# Patient Record
Sex: MALE | Race: WHITE | NOT HISPANIC OR LATINO | Employment: FULL TIME | URBAN - METROPOLITAN AREA
[De-identification: names, ages, dates, MRNs, and addresses within clinical notes are randomized per-mention and may not be internally consistent; named-entity substitution may affect disease eponyms.]

---

## 2020-03-06 ENCOUNTER — HISTORICAL (OUTPATIENT)
Dept: ADMINISTRATIVE | Facility: HOSPITAL | Age: 60
End: 2020-03-06

## 2021-07-06 RX ORDER — ALBUTEROL SULFATE 90 UG/1
2 AEROSOL, METERED RESPIRATORY (INHALATION) EVERY 6 HOURS PRN
COMMUNITY
End: 2021-07-14 | Stop reason: SDUPTHER

## 2021-07-06 RX ORDER — ROSUVASTATIN CALCIUM 20 MG/1
20 TABLET, COATED ORAL DAILY
COMMUNITY
End: 2021-07-14 | Stop reason: SDUPTHER

## 2021-07-06 RX ORDER — LOSARTAN POTASSIUM 50 MG/1
50 TABLET ORAL DAILY
COMMUNITY

## 2021-07-06 RX ORDER — ASPIRIN 81 MG/1
81 TABLET ORAL DAILY
COMMUNITY

## 2021-07-06 RX ORDER — CLOPIDOGREL BISULFATE 75 MG/1
75 TABLET ORAL DAILY
Status: ON HOLD | COMMUNITY
End: 2023-10-12 | Stop reason: SDUPTHER

## 2021-07-14 ENCOUNTER — OFFICE VISIT (OUTPATIENT)
Dept: FAMILY MEDICINE | Facility: CLINIC | Age: 61
End: 2021-07-14
Payer: COMMERCIAL

## 2021-07-14 DIAGNOSIS — Z12.5 PROSTATE CANCER SCREENING: ICD-10-CM

## 2021-07-14 DIAGNOSIS — I10 ESSENTIAL HYPERTENSION: ICD-10-CM

## 2021-07-14 DIAGNOSIS — E78.2 MIXED HYPERLIPIDEMIA: ICD-10-CM

## 2021-07-14 DIAGNOSIS — J42 CHRONIC BRONCHITIS, UNSPECIFIED CHRONIC BRONCHITIS TYPE: ICD-10-CM

## 2021-07-14 DIAGNOSIS — Z00.00 WELL ADULT EXAM: Primary | ICD-10-CM

## 2021-07-14 LAB
ALBUMIN SERPL BCP-MCNC: 4 G/DL (ref 3.5–5)
ALBUMIN/GLOB SERPL: 1.4 {RATIO}
ALP SERPL-CCNC: 80 U/L (ref 45–115)
ALT SERPL W P-5'-P-CCNC: 29 U/L (ref 16–61)
ANION GAP SERPL CALCULATED.3IONS-SCNC: 13 MMOL/L (ref 7–16)
AST SERPL W P-5'-P-CCNC: 21 U/L (ref 15–37)
BASOPHILS # BLD AUTO: 0.03 K/UL (ref 0–0.2)
BASOPHILS NFR BLD AUTO: 0.4 % (ref 0–1)
BILIRUB SERPL-MCNC: 0.5 MG/DL (ref 0–1.2)
BUN SERPL-MCNC: 21 MG/DL (ref 7–18)
BUN/CREAT SERPL: 21 (ref 6–20)
CALCIUM SERPL-MCNC: 8.9 MG/DL (ref 8.5–10.1)
CHLORIDE SERPL-SCNC: 110 MMOL/L (ref 98–107)
CHOLEST SERPL-MCNC: 141 MG/DL (ref 0–200)
CHOLEST/HDLC SERPL: 3 {RATIO}
CO2 SERPL-SCNC: 23 MMOL/L (ref 21–32)
CREAT SERPL-MCNC: 1.02 MG/DL (ref 0.7–1.3)
DIFFERENTIAL METHOD BLD: ABNORMAL
EOSINOPHIL # BLD AUTO: 0.46 K/UL (ref 0–0.5)
EOSINOPHIL NFR BLD AUTO: 6.5 % (ref 1–4)
ERYTHROCYTE [DISTWIDTH] IN BLOOD BY AUTOMATED COUNT: 12.7 % (ref 11.5–14.5)
GLOBULIN SER-MCNC: 2.9 G/DL (ref 2–4)
GLUCOSE SERPL-MCNC: 108 MG/DL (ref 74–106)
HCT VFR BLD AUTO: 39.3 % (ref 40–54)
HDLC SERPL-MCNC: 47 MG/DL (ref 40–60)
HGB BLD-MCNC: 13.3 G/DL (ref 13.5–18)
IMM GRANULOCYTES # BLD AUTO: 0.02 K/UL (ref 0–0.04)
IMM GRANULOCYTES NFR BLD: 0.3 % (ref 0–0.4)
LDLC SERPL CALC-MCNC: 78 MG/DL
LDLC/HDLC SERPL: 1.7 {RATIO}
LYMPHOCYTES # BLD AUTO: 1.94 K/UL (ref 1–4.8)
LYMPHOCYTES NFR BLD AUTO: 27.3 % (ref 27–41)
MCH RBC QN AUTO: 32.1 PG (ref 27–31)
MCHC RBC AUTO-ENTMCNC: 33.8 G/DL (ref 32–36)
MCV RBC AUTO: 94.9 FL (ref 80–96)
MONOCYTES # BLD AUTO: 0.64 K/UL (ref 0–0.8)
MONOCYTES NFR BLD AUTO: 9 % (ref 2–6)
MPC BLD CALC-MCNC: 11.3 FL (ref 9.4–12.4)
NEUTROPHILS # BLD AUTO: 4.02 K/UL (ref 1.8–7.7)
NEUTROPHILS NFR BLD AUTO: 56.5 % (ref 53–65)
NONHDLC SERPL-MCNC: 94 MG/DL
NRBC # BLD AUTO: 0 X10E3/UL
NRBC, AUTO (.00): 0 %
PLATELET # BLD AUTO: 240 K/UL (ref 150–400)
POTASSIUM SERPL-SCNC: 4.2 MMOL/L (ref 3.5–5.1)
PROT SERPL-MCNC: 6.9 G/DL (ref 6.4–8.2)
PSA SERPL-MCNC: 1.01 NG/ML (ref 0–4.1)
RBC # BLD AUTO: 4.14 M/UL (ref 4.6–6.2)
SODIUM SERPL-SCNC: 142 MMOL/L (ref 136–145)
TRIGL SERPL-MCNC: 79 MG/DL (ref 35–150)
VLDLC SERPL-MCNC: 16 MG/DL
WBC # BLD AUTO: 7.11 K/UL (ref 4.5–11)

## 2021-07-14 PROCEDURE — 80061 LIPID PANEL: ICD-10-PCS | Mod: ,,, | Performed by: CLINICAL MEDICAL LABORATORY

## 2021-07-14 PROCEDURE — 85025 CBC WITH DIFFERENTIAL: ICD-10-PCS | Mod: ,,, | Performed by: CLINICAL MEDICAL LABORATORY

## 2021-07-14 PROCEDURE — 99396 PR PREVENTIVE VISIT,EST,40-64: ICD-10-PCS | Mod: ,,, | Performed by: FAMILY MEDICINE

## 2021-07-14 PROCEDURE — 80053 COMPREHENSIVE METABOLIC PANEL: ICD-10-PCS | Mod: ,,, | Performed by: CLINICAL MEDICAL LABORATORY

## 2021-07-14 PROCEDURE — 99396 PREV VISIT EST AGE 40-64: CPT | Mod: ,,, | Performed by: FAMILY MEDICINE

## 2021-07-14 PROCEDURE — 80061 LIPID PANEL: CPT | Mod: ,,, | Performed by: CLINICAL MEDICAL LABORATORY

## 2021-07-14 PROCEDURE — G0103 PSA SCREENING: HCPCS | Mod: ,,, | Performed by: CLINICAL MEDICAL LABORATORY

## 2021-07-14 PROCEDURE — 80053 COMPREHEN METABOLIC PANEL: CPT | Mod: ,,, | Performed by: CLINICAL MEDICAL LABORATORY

## 2021-07-14 PROCEDURE — G0103 PSA, SCREENING: ICD-10-PCS | Mod: ,,, | Performed by: CLINICAL MEDICAL LABORATORY

## 2021-07-14 PROCEDURE — 85025 COMPLETE CBC W/AUTO DIFF WBC: CPT | Mod: ,,, | Performed by: CLINICAL MEDICAL LABORATORY

## 2021-07-14 RX ORDER — ROSUVASTATIN CALCIUM 20 MG/1
20 TABLET, COATED ORAL DAILY
Qty: 90 TABLET | Refills: 3 | Status: SHIPPED | OUTPATIENT
Start: 2021-07-14

## 2021-07-14 RX ORDER — ALBUTEROL SULFATE 90 UG/1
2 AEROSOL, METERED RESPIRATORY (INHALATION) EVERY 6 HOURS PRN
Qty: 18 G | Refills: 11 | Status: SHIPPED | OUTPATIENT
Start: 2021-07-14

## 2021-07-28 ENCOUNTER — OFFICE VISIT (OUTPATIENT)
Dept: FAMILY MEDICINE | Facility: CLINIC | Age: 61
End: 2021-07-28
Payer: COMMERCIAL

## 2021-07-28 VITALS
BODY MASS INDEX: 20.27 KG/M2 | SYSTOLIC BLOOD PRESSURE: 130 MMHG | WEIGHT: 144.81 LBS | HEART RATE: 76 BPM | OXYGEN SATURATION: 96 % | RESPIRATION RATE: 18 BRPM | HEIGHT: 71 IN | DIASTOLIC BLOOD PRESSURE: 84 MMHG

## 2021-07-28 DIAGNOSIS — K59.00 CONSTIPATION, UNSPECIFIED CONSTIPATION TYPE: ICD-10-CM

## 2021-07-28 DIAGNOSIS — I10 ESSENTIAL HYPERTENSION: ICD-10-CM

## 2021-07-28 DIAGNOSIS — F17.210 NICOTINE DEPENDENCE, CIGARETTES, UNCOMPLICATED: ICD-10-CM

## 2021-07-28 DIAGNOSIS — J42 CHRONIC BRONCHITIS, UNSPECIFIED CHRONIC BRONCHITIS TYPE: Primary | ICD-10-CM

## 2021-07-28 PROCEDURE — 1160F PR REVIEW ALL MEDS BY PRESCRIBER/CLIN PHARMACIST DOCUMENTED: ICD-10-PCS | Mod: CPTII,,, | Performed by: FAMILY MEDICINE

## 2021-07-28 PROCEDURE — 3079F DIAST BP 80-89 MM HG: CPT | Mod: CPTII,,, | Performed by: FAMILY MEDICINE

## 2021-07-28 PROCEDURE — 1160F RVW MEDS BY RX/DR IN RCRD: CPT | Mod: CPTII,,, | Performed by: FAMILY MEDICINE

## 2021-07-28 PROCEDURE — 1159F MED LIST DOCD IN RCRD: CPT | Mod: CPTII,,, | Performed by: FAMILY MEDICINE

## 2021-07-28 PROCEDURE — 99214 OFFICE O/P EST MOD 30 MIN: CPT | Mod: ,,, | Performed by: FAMILY MEDICINE

## 2021-07-28 PROCEDURE — 99214 PR OFFICE/OUTPT VISIT, EST, LEVL IV, 30-39 MIN: ICD-10-PCS | Mod: ,,, | Performed by: FAMILY MEDICINE

## 2021-07-28 PROCEDURE — 3075F SYST BP GE 130 - 139MM HG: CPT | Mod: CPTII,,, | Performed by: FAMILY MEDICINE

## 2021-07-28 PROCEDURE — 3008F BODY MASS INDEX DOCD: CPT | Mod: CPTII,,, | Performed by: FAMILY MEDICINE

## 2021-07-28 PROCEDURE — 3008F PR BODY MASS INDEX (BMI) DOCUMENTED: ICD-10-PCS | Mod: CPTII,,, | Performed by: FAMILY MEDICINE

## 2021-07-28 PROCEDURE — 3075F PR MOST RECENT SYSTOLIC BLOOD PRESS GE 130-139MM HG: ICD-10-PCS | Mod: CPTII,,, | Performed by: FAMILY MEDICINE

## 2021-07-28 PROCEDURE — 3079F PR MOST RECENT DIASTOLIC BLOOD PRESSURE 80-89 MM HG: ICD-10-PCS | Mod: CPTII,,, | Performed by: FAMILY MEDICINE

## 2021-07-28 PROCEDURE — 1159F PR MEDICATION LIST DOCUMENTED IN MEDICAL RECORD: ICD-10-PCS | Mod: CPTII,,, | Performed by: FAMILY MEDICINE

## 2021-07-28 RX ORDER — AMLODIPINE BESYLATE 5 MG/1
1 TABLET ORAL DAILY
COMMUNITY
End: 2023-08-30

## 2021-07-28 RX ORDER — CARVEDILOL 6.25 MG/1
1 TABLET ORAL DAILY
COMMUNITY
Start: 2021-02-25

## 2021-07-28 RX ORDER — MORPHINE SULFATE 30 MG/1
30 TABLET, FILM COATED, EXTENDED RELEASE ORAL EVERY 12 HOURS
COMMUNITY
Start: 2021-07-13 | End: 2023-08-30

## 2021-07-28 RX ORDER — IRBESARTAN 150 MG/1
1 TABLET ORAL DAILY
COMMUNITY
End: 2023-08-30

## 2021-07-28 RX ORDER — UMECLIDINIUM BROMIDE AND VILANTEROL TRIFENATATE 62.5; 25 UG/1; UG/1
1 POWDER RESPIRATORY (INHALATION) DAILY
Qty: 60 EACH | Refills: 11 | Status: SHIPPED | OUTPATIENT
Start: 2021-07-28 | End: 2023-08-30

## 2021-08-04 ENCOUNTER — OFFICE VISIT (OUTPATIENT)
Dept: GASTROENTEROLOGY | Facility: CLINIC | Age: 61
End: 2021-08-04
Payer: COMMERCIAL

## 2021-08-04 VITALS
SYSTOLIC BLOOD PRESSURE: 126 MMHG | DIASTOLIC BLOOD PRESSURE: 79 MMHG | HEIGHT: 71 IN | WEIGHT: 145 LBS | RESPIRATION RATE: 16 BRPM | HEART RATE: 65 BPM | OXYGEN SATURATION: 98 % | BODY MASS INDEX: 20.3 KG/M2

## 2021-08-04 DIAGNOSIS — K59.04 CHRONIC IDIOPATHIC CONSTIPATION: Primary | ICD-10-CM

## 2021-08-04 DIAGNOSIS — K59.00 CONSTIPATION, UNSPECIFIED CONSTIPATION TYPE: ICD-10-CM

## 2021-08-04 DIAGNOSIS — Z12.11 ENCOUNTER FOR SCREENING FOR MALIGNANT NEOPLASM OF COLON: ICD-10-CM

## 2021-08-04 PROCEDURE — 3078F DIAST BP <80 MM HG: CPT | Mod: CPTII,,, | Performed by: NURSE PRACTITIONER

## 2021-08-04 PROCEDURE — 3074F SYST BP LT 130 MM HG: CPT | Mod: CPTII,,, | Performed by: NURSE PRACTITIONER

## 2021-08-04 PROCEDURE — 99203 PR OFFICE/OUTPT VISIT, NEW, LEVL III, 30-44 MIN: ICD-10-PCS | Mod: ,,, | Performed by: NURSE PRACTITIONER

## 2021-08-04 PROCEDURE — 3008F PR BODY MASS INDEX (BMI) DOCUMENTED: ICD-10-PCS | Mod: CPTII,,, | Performed by: NURSE PRACTITIONER

## 2021-08-04 PROCEDURE — 99203 OFFICE O/P NEW LOW 30 MIN: CPT | Mod: ,,, | Performed by: NURSE PRACTITIONER

## 2021-08-04 PROCEDURE — 3008F BODY MASS INDEX DOCD: CPT | Mod: CPTII,,, | Performed by: NURSE PRACTITIONER

## 2021-08-04 PROCEDURE — 3078F PR MOST RECENT DIASTOLIC BLOOD PRESSURE < 80 MM HG: ICD-10-PCS | Mod: CPTII,,, | Performed by: NURSE PRACTITIONER

## 2021-08-04 PROCEDURE — 3074F PR MOST RECENT SYSTOLIC BLOOD PRESSURE < 130 MM HG: ICD-10-PCS | Mod: CPTII,,, | Performed by: NURSE PRACTITIONER

## 2021-08-04 RX ORDER — PLECANATIDE 3 MG/1
3 TABLET ORAL DAILY
Qty: 90 TABLET | Refills: 3 | Status: SHIPPED | OUTPATIENT
Start: 2021-08-04 | End: 2023-08-30

## 2021-08-17 ENCOUNTER — HOSPITAL ENCOUNTER (OUTPATIENT)
Dept: RADIOLOGY | Facility: HOSPITAL | Age: 61
Discharge: HOME | End: 2021-08-17
Attending: FAMILY MEDICINE
Payer: COMMERCIAL

## 2021-08-17 DIAGNOSIS — F17.210 NICOTINE DEPENDENCE, CIGARETTES, UNCOMPLICATED: ICD-10-CM

## 2021-08-17 PROCEDURE — 71271 CT THORAX LUNG CANCER SCR C-: CPT | Mod: TC

## 2021-08-17 PROCEDURE — 71271 CT CHEST LUNG SCREENING LOW DOSE: ICD-10-PCS | Mod: 26,,, | Performed by: RADIOLOGY

## 2021-08-17 PROCEDURE — 71271 CT THORAX LUNG CANCER SCR C-: CPT | Mod: 26,,, | Performed by: RADIOLOGY

## 2021-08-26 ENCOUNTER — INFUSION (OUTPATIENT)
Dept: INFECTIOUS DISEASES | Facility: HOSPITAL | Age: 61
End: 2021-08-26
Attending: NURSE PRACTITIONER
Payer: COMMERCIAL

## 2021-08-26 VITALS
SYSTOLIC BLOOD PRESSURE: 148 MMHG | HEART RATE: 110 BPM | DIASTOLIC BLOOD PRESSURE: 84 MMHG | TEMPERATURE: 98 F | OXYGEN SATURATION: 98 %

## 2021-08-26 DIAGNOSIS — U07.1 COVID-19: Primary | ICD-10-CM

## 2021-08-26 PROCEDURE — M0243 CASIRIVI AND IMDEVI INFUSION: HCPCS | Performed by: FAMILY MEDICINE

## 2021-08-26 PROCEDURE — 25000003 PHARM REV CODE 250: Performed by: FAMILY MEDICINE

## 2021-08-26 PROCEDURE — 63600175 PHARM REV CODE 636 W HCPCS: Performed by: FAMILY MEDICINE

## 2021-08-26 RX ORDER — SODIUM CHLORIDE 0.9 % (FLUSH) 0.9 %
10 SYRINGE (ML) INJECTION
Status: DISCONTINUED | OUTPATIENT
Start: 2021-08-26 | End: 2023-08-30

## 2021-08-26 RX ORDER — ACETAMINOPHEN 325 MG/1
650 TABLET ORAL ONCE AS NEEDED
Status: DISCONTINUED | OUTPATIENT
Start: 2021-08-26 | End: 2023-08-30

## 2021-08-26 RX ORDER — DIPHENHYDRAMINE HYDROCHLORIDE 50 MG/ML
25 INJECTION INTRAMUSCULAR; INTRAVENOUS ONCE AS NEEDED
Status: DISCONTINUED | OUTPATIENT
Start: 2021-08-26 | End: 2023-08-30

## 2021-08-26 RX ORDER — EPINEPHRINE 0.3 MG/.3ML
0.3 INJECTION SUBCUTANEOUS
Status: DISCONTINUED | OUTPATIENT
Start: 2021-08-26 | End: 2023-08-30

## 2021-08-26 RX ORDER — ALBUTEROL SULFATE 90 UG/1
2 AEROSOL, METERED RESPIRATORY (INHALATION)
Status: DISCONTINUED | OUTPATIENT
Start: 2021-08-26 | End: 2023-08-30

## 2021-08-26 RX ORDER — ONDANSETRON 4 MG/1
4 TABLET, ORALLY DISINTEGRATING ORAL ONCE AS NEEDED
Status: DISCONTINUED | OUTPATIENT
Start: 2021-08-26 | End: 2023-08-30

## 2021-08-26 RX ADMIN — CASIRIVIMAB AND IMDEVIMAB 600 MG: 600; 600 INJECTION, SOLUTION, CONCENTRATE INTRAVENOUS at 11:08

## 2021-09-07 DIAGNOSIS — Z11.59 SCREENING FOR VIRAL DISEASE: Primary | ICD-10-CM

## 2022-01-07 DIAGNOSIS — U07.1 COVID-19: Primary | ICD-10-CM

## 2022-01-11 ENCOUNTER — INFUSION (OUTPATIENT)
Dept: INFECTIOUS DISEASES | Facility: HOSPITAL | Age: 62
End: 2022-01-11
Attending: FAMILY MEDICINE
Payer: COMMERCIAL

## 2022-01-11 VITALS
SYSTOLIC BLOOD PRESSURE: 128 MMHG | HEART RATE: 73 BPM | TEMPERATURE: 98 F | OXYGEN SATURATION: 96 % | DIASTOLIC BLOOD PRESSURE: 54 MMHG

## 2022-01-11 DIAGNOSIS — U07.1 COVID-19: ICD-10-CM

## 2022-01-11 PROCEDURE — 25000003 PHARM REV CODE 250: Performed by: FAMILY MEDICINE

## 2022-01-11 PROCEDURE — 63600175 PHARM REV CODE 636 W HCPCS: Performed by: FAMILY MEDICINE

## 2022-01-11 PROCEDURE — M0245 HC IV INFUSION, BAMLANIVIMAB/ETESEVIMAB, INCL POST ADMIN MONIT: HCPCS | Performed by: FAMILY MEDICINE

## 2022-01-11 RX ORDER — ALBUTEROL SULFATE 90 UG/1
2 AEROSOL, METERED RESPIRATORY (INHALATION)
Status: DISCONTINUED | OUTPATIENT
Start: 2022-01-11 | End: 2023-08-30

## 2022-01-11 RX ORDER — ONDANSETRON 4 MG/1
4 TABLET, ORALLY DISINTEGRATING ORAL ONCE AS NEEDED
Status: DISCONTINUED | OUTPATIENT
Start: 2022-01-11 | End: 2023-08-30

## 2022-01-11 RX ORDER — EPINEPHRINE 0.3 MG/.3ML
0.3 INJECTION SUBCUTANEOUS
Status: DISCONTINUED | OUTPATIENT
Start: 2022-01-11 | End: 2023-08-30

## 2022-01-11 RX ORDER — SODIUM CHLORIDE 0.9 % (FLUSH) 0.9 %
10 SYRINGE (ML) INJECTION
Status: DISCONTINUED | OUTPATIENT
Start: 2022-01-11 | End: 2023-08-30

## 2022-01-11 RX ORDER — ACETAMINOPHEN 325 MG/1
650 TABLET ORAL ONCE AS NEEDED
Status: DISCONTINUED | OUTPATIENT
Start: 2022-01-11 | End: 2023-08-30

## 2022-01-11 RX ORDER — DIPHENHYDRAMINE HYDROCHLORIDE 50 MG/ML
25 INJECTION INTRAMUSCULAR; INTRAVENOUS ONCE AS NEEDED
Status: DISCONTINUED | OUTPATIENT
Start: 2022-01-11 | End: 2023-08-30

## 2022-01-11 RX ADMIN — SODIUM CHLORIDE 700 MG: 9 INJECTION, SOLUTION INTRAVENOUS at 09:01

## 2022-01-11 NOTE — PROGRESS NOTES
0950 Arrived foe a Bamlanivimab infusion.    
1040 Infusion is complete. Flushed with 30 cc NS.    
1145 Discharge instructions given. Left ambulatory through the ER exit.    
Statement Selected

## 2022-02-08 ENCOUNTER — OFFICE VISIT (OUTPATIENT)
Dept: PRIMARY CARE CLINIC | Facility: CLINIC | Age: 62
End: 2022-02-08
Payer: OTHER MISCELLANEOUS

## 2022-02-08 ENCOUNTER — HOSPITAL ENCOUNTER (OUTPATIENT)
Dept: RADIOLOGY | Facility: HOSPITAL | Age: 62
Discharge: HOME | End: 2022-02-08
Attending: NURSE PRACTITIONER
Payer: COMMERCIAL

## 2022-02-08 VITALS
HEART RATE: 82 BPM | WEIGHT: 145 LBS | TEMPERATURE: 98 F | BODY MASS INDEX: 20.3 KG/M2 | HEIGHT: 71 IN | RESPIRATION RATE: 18 BRPM | SYSTOLIC BLOOD PRESSURE: 152 MMHG | DIASTOLIC BLOOD PRESSURE: 99 MMHG | OXYGEN SATURATION: 97 %

## 2022-02-08 DIAGNOSIS — S20.211A RIB CONTUSION, RIGHT, INITIAL ENCOUNTER: Primary | ICD-10-CM

## 2022-02-08 DIAGNOSIS — S20.211A RIB CONTUSION, RIGHT, INITIAL ENCOUNTER: ICD-10-CM

## 2022-02-08 PROCEDURE — 99213 OFFICE O/P EST LOW 20 MIN: CPT | Mod: ,,, | Performed by: NURSE PRACTITIONER

## 2022-02-08 PROCEDURE — 99213 PR OFFICE/OUTPT VISIT, EST, LEVL III, 20-29 MIN: ICD-10-PCS | Mod: ,,, | Performed by: NURSE PRACTITIONER

## 2022-02-08 PROCEDURE — 71101 XR RIBS MIN 3 VIEWS W/ PA CHEST RIGHT: ICD-10-PCS | Mod: 26,RT,, | Performed by: RADIOLOGY

## 2022-02-08 PROCEDURE — 71101 X-RAY EXAM UNILAT RIBS/CHEST: CPT | Mod: TC,RT

## 2022-02-08 PROCEDURE — 71101 X-RAY EXAM UNILAT RIBS/CHEST: CPT | Mod: 26,RT,, | Performed by: RADIOLOGY

## 2022-02-08 RX ORDER — METHOCARBAMOL 500 MG/1
500 TABLET, FILM COATED ORAL 4 TIMES DAILY
Qty: 30 TABLET | Refills: 0 | Status: SHIPPED | OUTPATIENT
Start: 2022-02-08 | End: 2022-04-06 | Stop reason: SDUPTHER
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
## 2022-03-28 ENCOUNTER — OFFICE VISIT (OUTPATIENT)
Dept: PRIMARY CARE CLINIC | Facility: CLINIC | Age: 62
End: 2022-03-28
Payer: OTHER MISCELLANEOUS

## 2022-03-28 ENCOUNTER — HOSPITAL ENCOUNTER (OUTPATIENT)
Dept: RADIOLOGY | Facility: HOSPITAL | Age: 62
Discharge: HOME | End: 2022-03-28
Attending: NURSE PRACTITIONER
Payer: COMMERCIAL

## 2022-03-28 VITALS
TEMPERATURE: 99 F | HEIGHT: 71 IN | RESPIRATION RATE: 18 BRPM | DIASTOLIC BLOOD PRESSURE: 92 MMHG | OXYGEN SATURATION: 98 % | BODY MASS INDEX: 20.3 KG/M2 | WEIGHT: 145 LBS | SYSTOLIC BLOOD PRESSURE: 144 MMHG | HEART RATE: 98 BPM

## 2022-03-28 DIAGNOSIS — S39.012A STRAIN OF LUMBAR REGION, INITIAL ENCOUNTER: ICD-10-CM

## 2022-03-28 DIAGNOSIS — S29.019A THORACIC MYOFASCIAL STRAIN, INITIAL ENCOUNTER: ICD-10-CM

## 2022-03-28 DIAGNOSIS — S29.019A THORACIC MYOFASCIAL STRAIN, INITIAL ENCOUNTER: Primary | ICD-10-CM

## 2022-03-28 PROCEDURE — 72100 X-RAY EXAM L-S SPINE 2/3 VWS: CPT | Mod: TC

## 2022-03-28 PROCEDURE — 72070 XR THORACIC SPINE AP LATERAL: ICD-10-PCS | Mod: 26,,, | Performed by: STUDENT IN AN ORGANIZED HEALTH CARE EDUCATION/TRAINING PROGRAM

## 2022-03-28 PROCEDURE — 72070 X-RAY EXAM THORAC SPINE 2VWS: CPT | Mod: 26,,, | Performed by: STUDENT IN AN ORGANIZED HEALTH CARE EDUCATION/TRAINING PROGRAM

## 2022-03-28 PROCEDURE — 99213 PR OFFICE/OUTPT VISIT, EST, LEVL III, 20-29 MIN: ICD-10-PCS | Mod: ,,, | Performed by: NURSE PRACTITIONER

## 2022-03-28 PROCEDURE — 72100 X-RAY EXAM L-S SPINE 2/3 VWS: CPT | Mod: 26,,, | Performed by: STUDENT IN AN ORGANIZED HEALTH CARE EDUCATION/TRAINING PROGRAM

## 2022-03-28 PROCEDURE — 99213 OFFICE O/P EST LOW 20 MIN: CPT | Mod: ,,, | Performed by: NURSE PRACTITIONER

## 2022-03-28 PROCEDURE — 72070 X-RAY EXAM THORAC SPINE 2VWS: CPT | Mod: TC

## 2022-03-28 PROCEDURE — 72100 XR LUMBAR SPINE AP AND LATERAL: ICD-10-PCS | Mod: 26,,, | Performed by: STUDENT IN AN ORGANIZED HEALTH CARE EDUCATION/TRAINING PROGRAM

## 2022-03-28 RX ORDER — IBUPROFEN 800 MG/1
800 TABLET ORAL EVERY 6 HOURS PRN
Qty: 30 TABLET | Refills: 0 | Status: SHIPPED | OUTPATIENT
Start: 2022-03-28 | End: 2023-08-30

## 2022-03-28 NOTE — PROGRESS NOTES
Subjective:       Patient ID: Garett Banegas is a 61 y.o. male.    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 He gait is WNL and he can bend and flex without problems.     Review of Systems   Gastrointestinal: Negative for fecal incontinence.   Genitourinary: Negative for bladder incontinence.   Musculoskeletal: Positive for back pain. Negative for gait problem, joint swelling and joint deformity.   Neurological: Negative for numbness.   All other systems reviewed and are negative.        Objective:      Physical Exam  Vitals and nursing note reviewed.   Constitutional:       Appearance: Normal appearance.   HENT:      Head: Normocephalic.   Eyes:      Pupils: Pupils are equal, round, and reactive to light.   Cardiovascular:      Rate and Rhythm: Normal rate and regular rhythm.      Heart sounds: Normal heart sounds.   Pulmonary:      Effort: Pulmonary effort is normal.      Breath sounds: Normal breath sounds.   Musculoskeletal:         General: Signs of injury present. No swelling, tenderness or deformity. Normal range of motion.      Cervical back: Normal range of motion.   Skin:     General: Skin is warm and dry.      Findings: No bruising.   Neurological:      General: No focal deficit present.      Mental Status: He is alert and oriented to person, place, and time.   Psychiatric:         Attention and Perception: Attention and perception normal.         Mood and Affect: Mood normal.         Speech: Speech normal.         Behavior: Behavior normal. Behavior is cooperative.         Cognition and Memory: Cognition normal.         Judgment: Judgment normal.         Imaging: X-Ray Thoracic Spine AP Lateral    Result Date: 3/28/2022  EXAMINATION: XR THORACIC SPINE AP LATERAL CLINICAL HISTORY: Strain of muscle and tendon of unspecified wall of  thorax, initial encounter TECHNIQUE: XR THORACIC SPINE AP LATERAL COMPARISON: Comparisons were reviewed, if available.   FINDINGS: Moderate multilevel degenerative change. Interval development of compression fracture of T11 and T12, correlate clinically.     As above. Electronically signed by: Rajesh Jung Date:    03/28/2022 Time:    16:14    X-Ray Lumbar Spine AP And Lateral    Result Date: 3/28/2022  EXAMINATION: XR LUMBAR SPINE AP AND LATERAL CLINICAL HISTORY: .  Strain of muscle, fascia and tendon of lower back, initial encounter TECHNIQUE: [id number] COMPARISON: None   FINDINGS: Mild multilevel degenerative change. Posterior spinal fusion hardware L4-5 in expected position. No acute findings     As above Electronically signed by: Rajesh Jung Date:    03/28/2022 Time:    16:11      Assessment:       Problem List Items Addressed This Visit        Orthopedic    Thoracic myofascial strain - Primary    Relevant Orders    X-Ray Thoracic Spine AP Lateral (Completed)    Strain of lumbar region    Relevant Orders    X-Ray Lumbar Spine AP And Lateral (Completed)          Plan:       RTW with limited duty. No lifting over 10-15 lbs. No climbing, bending or stooping. No prolonged standing or walking. Warm epson salt soaks, Salonpas patch, and Ibuprofen for pain relief. RTN to [facility] in 1 week.

## 2022-04-06 ENCOUNTER — OFFICE VISIT (OUTPATIENT)
Dept: PRIMARY CARE CLINIC | Facility: CLINIC | Age: 62
End: 2022-04-06
Payer: OTHER MISCELLANEOUS

## 2022-04-06 VITALS
BODY MASS INDEX: 20.3 KG/M2 | DIASTOLIC BLOOD PRESSURE: 87 MMHG | OXYGEN SATURATION: 98 % | TEMPERATURE: 99 F | WEIGHT: 145 LBS | HEART RATE: 88 BPM | HEIGHT: 71 IN | SYSTOLIC BLOOD PRESSURE: 143 MMHG

## 2022-04-06 DIAGNOSIS — S39.012D LUMBAR STRAIN, SUBSEQUENT ENCOUNTER: Primary | ICD-10-CM

## 2022-04-06 DIAGNOSIS — S29.019D THORACIC MYOFASCIAL STRAIN, SUBSEQUENT ENCOUNTER: ICD-10-CM

## 2022-04-06 PROCEDURE — 99213 OFFICE O/P EST LOW 20 MIN: CPT | Mod: ,,, | Performed by: NURSE PRACTITIONER

## 2022-04-06 PROCEDURE — 99213 PR OFFICE/OUTPT VISIT, EST, LEVL III, 20-29 MIN: ICD-10-PCS | Mod: ,,, | Performed by: NURSE PRACTITIONER

## 2022-04-06 RX ORDER — METHOCARBAMOL 500 MG/1
500 TABLET, FILM COATED ORAL 4 TIMES DAILY
Qty: 30 TABLET | Refills: 0 | Status: SHIPPED | OUTPATIENT
Start: 2022-04-06 | End: 2023-08-30

## 2022-04-06 NOTE — PROGRESS NOTES
Subjective:       Patient ID: Garett Banegas is a 61 y.o. male.    Chief Complaint: Work Related Injury (Presents today with work related injury that occurred on 01/29/22 continues to c/o back pain)    62 y/o wm presents today due to continued pain in his mid and low back. He states that it hasn't got well since initial injury back in January. His gait is WNL without guarded movements or slow walking. He rode a motorcycle to his appt and I watched him get on it to leave without any difficulty.      Review of Systems   Musculoskeletal: Positive for back pain.   All other systems reviewed and are negative.        Objective:      Physical Exam  Vitals and nursing note reviewed.   Constitutional:       Appearance: Normal appearance.   HENT:      Head: Normocephalic.   Eyes:      Pupils: Pupils are equal, round, and reactive to light.   Cardiovascular:      Rate and Rhythm: Normal rate and regular rhythm.      Heart sounds: Normal heart sounds.   Pulmonary:      Effort: Pulmonary effort is normal.      Breath sounds: Normal breath sounds.   Musculoskeletal:         General: No swelling or tenderness. Normal range of motion.      Cervical back: Normal range of motion.   Skin:     General: Skin is warm and dry.   Neurological:      General: No focal deficit present.      Mental Status: He is alert and oriented to person, place, and time.   Psychiatric:         Attention and Perception: Attention and perception normal.         Mood and Affect: Mood normal.         Speech: Speech normal.         Behavior: Behavior normal. Behavior is cooperative.         Cognition and Memory: Cognition normal.         Judgment: Judgment normal.         Assessment:       Problem List Items Addressed This Visit        Orthopedic    Thoracic myofascial strain    Relevant Orders    Ambulatory referral/consult to Physical/Occupational Therapy       Other    Lumbar strain, subsequent encounter - Primary    Relevant Orders    Ambulatory  referral/consult to Physical/Occupational Therapy          Plan:       RTW modified duty with no lifting over 10-15 lbs,. Referral to PT. Continue meds as prescribed. RTN to [facility] after your first PT appt.

## 2022-04-19 NOTE — PROGRESS NOTES
See PLAN OF CARE     Sup Visit performed today with BRENNA Chun and BRENNA Tran.  All goals and treatment plan reviewed. Will work toward completion of all goals set.      Plans for first treatment:       Back Exercises    Bike    Calf Stretch    Hamstring Stretch    Cybex Back    Cybex Abdominals    Cybex Leg Press -  Bilateral     Cybex Leg Press -  Single     Cybex Hip - Abduction    Cybex Hip - Flexion    Cybex Hip - Extension    Cybex Rows- Close    Cybex Rows - Wide    Cybex lat pulldowns    Lat stretch

## 2022-04-20 ENCOUNTER — CLINICAL SUPPORT (OUTPATIENT)
Dept: REHABILITATION | Facility: HOSPITAL | Age: 62
End: 2022-04-20
Payer: OTHER MISCELLANEOUS

## 2022-04-20 DIAGNOSIS — S39.012D LUMBAR STRAIN, SUBSEQUENT ENCOUNTER: ICD-10-CM

## 2022-04-20 DIAGNOSIS — S29.019D THORACIC MYOFASCIAL STRAIN, SUBSEQUENT ENCOUNTER: ICD-10-CM

## 2022-04-20 DIAGNOSIS — S22.000A COMPRESSION FRACTURE OF BODY OF THORACIC VERTEBRA: ICD-10-CM

## 2022-04-20 PROCEDURE — 97110 THERAPEUTIC EXERCISES: CPT

## 2022-04-20 PROCEDURE — 97161 PT EVAL LOW COMPLEX 20 MIN: CPT
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
## 2022-04-25 ENCOUNTER — CLINICAL SUPPORT (OUTPATIENT)
Dept: REHABILITATION | Facility: HOSPITAL | Age: 62
End: 2022-04-25
Payer: OTHER MISCELLANEOUS

## 2022-04-25 DIAGNOSIS — S22.000A COMPRESSION FRACTURE OF BODY OF THORACIC VERTEBRA: Primary | ICD-10-CM

## 2022-04-25 PROCEDURE — 97014 ELECTRIC STIMULATION THERAPY: CPT | Mod: CQ

## 2022-04-25 PROCEDURE — 97110 THERAPEUTIC EXERCISES: CPT | Mod: CQ

## 2022-04-25 PROCEDURE — 97010 HOT OR COLD PACKS THERAPY: CPT | Mod: CQ

## 2022-04-25 NOTE — PROGRESS NOTES
[facility]   Physical Therapy Treatment Note      Name: Garett Banegas  Clinic Number: [id number]    Therapy Diagnosis: No diagnosis found.  Physician: Jamila Valdez FNP    Visit Date: 4/25/2022    Evaluation Date: 4/20/2022  Updated Plan of Care Due : 5/19/2022  Authorization Period Expiration: 4/6/2023  Plan of Care Expiration: 6/15/2022  Visit # / Visits authorized: 2/ 16    Time In: 3:10 pm  Time Out: 4:10 pm  Total Billable Time: 60 minutes    Precautions: Standard    SUBJECTIVE     Pt reports: Hurting pretty bad.  He was compliant with home exercise program.  Response to previous treatment: first after evaluation   Functional change: none     Pain: 8/10  Location: bilateral back      TREATMENT       Kalpesh received therapeutic exercises to develop strength, ROM and core stabilization for 45 minutes including:    Bike  5 minutes   Calf Stretch  4 x 15 seconds    Hamstring Stretch  4 x 15 seconds    Cybex Back  2 x 10 3#   Cybex Abdominals  2 x 10 2#   Cybex Leg Press -  Bilateral   2 x 10 6#   Cybex Leg Press -  Single   2 x 10 3#   Cybex Hip - Abduction  2 x 10 2#   Cybex Hip - Flexion     Cybex Hip - Extension     Cybex Rows- Close  2 x 10 3#   Cybex Rows - Wide  2 x 10 3#   Cybex lat pulldowns  2 x 10 2#   Lat stretch                                                    Kalpesh received the following manual therapy techniques: Joint mobilizations, Manual traction and Myofacial release were applied to the: lower back for N/A minutes, including:  N/A    Kalpesh received the following supervised modalities after being cleared for contradictions: TENS:  Garett received TENS electrical stimulation for pain to the lower back. Pt received continuous mode for 15 minutes. Garett tolerated treatment well without any adverse effects.     PATIENT EDUCATION AND HOME EXERCISES     Home Exercises Provided and Patient Education Provided     Education provided:   - encouraged to continue HEP    Written Home  Exercises Provided: yes. Exercises were reviewed and Kalpesh was able to demonstrate them prior to the end of the session.  Kalpesh demonstrated good  understanding of the education provided. See EMR under Patient Instructions for exercises provided during therapy sessions    ASSESSMENT     Initiated plan of care. Patient was instructed in the correct body mechanics to perform exercises correctly. Patient had difficulty performing exercises due to pain in back. Ended with Stim and heat for pain and muscle spasm.    Kalpesh Is progressing well towards his goals.   Pt prognosis is Good.     Pt will continue to benefit from skilled outpatient physical therapy to address the deficits listed in the problem list box on initial evaluation, provide pt/family education and to maximize pt's level of independence in the home and community environment.     Pt's spiritual, cultural and educational needs considered and pt agreeable to plan of care and goals.     Anticipated barriers to physical therapy: none     Goals:   Short Term Goals: 4 weeks   1. Patient will be Independent with Home Exercise Program   2. Patient will demonstrate with improved Posture and Body Mechanics  3. Patient will increase Lumbosacral Range of Motion by 25%   4. Patient will increase scapular and Core Strength to grossly 4+/5 or greater  5. Patient will decrease complaints of pain with activity to Less than or Equal to 4/10      Long Term Goals: 8 weeks   1. Patient will tolerate 30 minutes of activity with complaints of Pain at Less Than or Equal to 2/10       PLAN     Plan of care Certification: 4/20/2022 to 6/15/2022     Outpatient Physical Therapy 2 times weekly for 8 weeks to include the following interventions: Electrical Stimulation Pre-Mod, Manual Therapy, Moist Heat/ Ice, Neuromuscular Re-ed, Patient Education and Therapeutic Exercise.       Emerita Nevarez, PTA

## 2022-05-05 ENCOUNTER — CLINICAL SUPPORT (OUTPATIENT)
Dept: REHABILITATION | Facility: HOSPITAL | Age: 62
End: 2022-05-05
Payer: OTHER MISCELLANEOUS

## 2022-05-05 DIAGNOSIS — S22.000A COMPRESSION FRACTURE OF BODY OF THORACIC VERTEBRA: Primary | ICD-10-CM

## 2022-05-05 PROCEDURE — 97110 THERAPEUTIC EXERCISES: CPT

## 2022-05-05 PROCEDURE — 97140 MANUAL THERAPY 1/> REGIONS: CPT

## 2022-05-05 NOTE — PROGRESS NOTES
[facility]   Physical Therapy Treatment Note      Name: Garett Banegas  [facility] [place] Number: [id number]    Therapy Diagnosis:   Encounter Diagnosis   Name Primary?    Compression fracture of body of thoracic vertebra Yes     Physician: Jamila Valdez FNP    Visit Date: 2022    Evaluation Date: 2022  Updated Plan of Care Due : 2022  Authorization Period Expiration: 2023  Plan of Care Expiration: 6/15/2022  Visit # / Visits authorized: 3 / 16    Time In: 8:00 am  Time Out: 9:00 am  Total Billable Time: 60 minutes    Precautions: Standard    SUBJECTIVE     Pt reports: Had to go to  yesterday and rode a lot causing stiffness in low back  He was compliant with home exercise program.  Response to previous treatment: little soreness   Functional change: none     Pain: 5-6/10  Location: bilateral back      TREATMENT       Kalpesh received therapeutic exercises to develop strength, ROM and core stabilization for 50 minutes including:    Bike  5 minutes   Calf Stretch  4 x 15 seconds    Hamstring Stretch  4 x 15 seconds    Cybex Back  3 x 10 4 plates   Cybex Abdominals  3 x 10 3 plates   Cybex Leg Press -  Bilateral   3 x 10 8 plates   Cybex Leg Press -  Single   3 x 10 4 plates   Cybex Hip - Abduction  2 x 10 2 plates   Cybex Hip - Flexion     Cybex Hip - Extension     Cybex Rows- Close  2 x 10 4 plates   Cybex Rows - Wide  2 x 10 4 plates   Cybex lat pulldowns  3 x 10 2 plates   Lat stretch   4 x 15 seconds                                                  Kalpesh received the following manual therapy techniques: Joint mobilizations, Manual traction and Myofacial release were applied to the: lower back for 10 minutes, including:    Lumbosacral rotation  Multifidus activation    Kalpesh received the following supervised modalities after being cleared for contradictions:  Garett received Pre-Mod electrical stimulation for pain to the lower back. Pt received continuous mode for 0 minutes. Garett  tolerated treatment well without any adverse effects.     PATIENT EDUCATION AND HOME EXERCISES     Home Exercises Provided and Patient Education Provided     Education provided:   - encouraged to continue HEP    Written Home Exercises Provided: yes. Exercises were reviewed and Kalpesh was able to demonstrate them prior to the end of the session.  Kalpesh demonstrated good  understanding of the education provided. See EMR under Patient Instructions for exercises provided during therapy sessions    ASSESSMENT     Increased weight and reps on Cybex back extensions, abdominals, rows, and leg press. Added lat stretch and manual treatment today to improve lumbosacral rotation and multifidus activation.    Kalpesh Is progressing well towards his goals.   Pt prognosis is Good.     Pt will continue to benefit from skilled outpatient physical therapy to address the deficits listed in the problem list box on initial evaluation, provide pt/family education and to maximize pt's level of independence in the home and community environment.     Pt's spiritual, cultural and educational needs considered and pt agreeable to plan of care and goals.     Anticipated barriers to physical therapy: none     Goals:   Short Term Goals: 4 weeks   1. Patient will be Independent with Home Exercise Program   2. Patient will demonstrate with improved Posture and Body Mechanics  3. Patient will increase Lumbosacral Range of Motion by 25%   4. Patient will increase scapular and Core Strength to grossly 4+/5 or greater  5. Patient will decrease complaints of pain with activity to Less than or Equal to 4/10      Long Term Goals: 8 weeks   1. Patient will tolerate 30 minutes of activity with complaints of Pain at Less Than or Equal to 2/10       PLAN     Plan of care Certification: 4/20/2022 to 6/15/2022     Outpatient Physical Therapy 2 times weekly for 8 weeks to include the following interventions: Electrical Stimulation Pre-Mod, Manual Therapy, Moist  Heat/ Ice, Neuromuscular Re-ed, Patient Education and Therapeutic Exercise.       CHARISSA GARCIA, PT, MLT    5/5/2022

## 2022-05-09 ENCOUNTER — CLINICAL SUPPORT (OUTPATIENT)
Dept: REHABILITATION | Facility: HOSPITAL | Age: 62
End: 2022-05-09
Payer: OTHER MISCELLANEOUS

## 2022-05-09 DIAGNOSIS — S22.000A COMPRESSION FRACTURE OF BODY OF THORACIC VERTEBRA: Primary | ICD-10-CM

## 2022-05-09 PROCEDURE — 97110 THERAPEUTIC EXERCISES: CPT

## 2022-05-09 PROCEDURE — 97140 MANUAL THERAPY 1/> REGIONS: CPT

## 2022-05-09 NOTE — PROGRESS NOTES
[facility]   Physical Therapy Treatment Note      [id number]    Therapy Diagnosis:   Encounter Diagnosis   Name Primary?    Compression fracture of body of thoracic vertebra Yes     Physician: Jamila Valdez FNP    Visit Date: 5/9/2022    Evaluation Date: 4/20/2022  Updated Plan of Care Due : 5/19/2022  Authorization Period Expiration: 4/6/2023  Plan of Care Expiration: 6/15/2022  Visit # / Visits authorized: 4 / 16    Time In: 3:05 pm  Time Out: 4:00 pm  Total Billable Time: 55 minutes    Precautions: Standard    SUBJECTIVE     Pt reports: finishing 3 days on shift and having a lot of heavy patient's causing increased pain.  He was compliant with home exercise program.  Response to previous treatment: little soreness   Functional change: none     Pain: 7/10  Location: bilateral back      TREATMENT       Kalpesh received therapeutic exercises to develop strength, ROM and core stabilization for 45 minutes including:    Bike  5 minutes   Calf Stretch  4 x 15 seconds    Hamstring Stretch  4 x 15 seconds    Cybex Back  3 x 10 4 plates   Cybex Abdominals  3 x 10 3 plates   Cybex Leg Press -  Bilateral   3 x 10 8 plates   Cybex Leg Press -  Single   3 x 10 4 plates   Cybex Hip - Abduction  2 x 10 2 plates   Cybex Hip - Flexion     Cybex Hip - Extension     Cybex Rows- Close  2 x 10 4 plates   Cybex Rows - Wide  2 x 10 4 plates   Cybex lat pulldowns  3 x 10 2 plates   Lat stretch   4 x 15 seconds                                                  Kalpesh received the following manual therapy techniques: Joint mobilizations, Manual traction and Myofacial release were applied to the: lower back for 10 minutes, including:    Lumbosacral rotation  Multifidus activation    Kalpesh received the following supervised modalities after being cleared for contradictions:  Garett received Pre-Mod electrical stimulation for pain to the lower back. Pt received continuous mode for 0 minutes.  Garett tolerated treatment well without any adverse effects.     PATIENT EDUCATION AND HOME EXERCISES     Home Exercises Provided and Patient Education Provided     Education provided:   - encouraged to continue HEP    Written Home Exercises Provided: yes. Exercises were reviewed and Kalpesh was able to demonstrate them prior to the end of the session.  Kalpesh demonstrated good  understanding of the education provided. See EMR under Patient Instructions for exercises provided during therapy sessions    ASSESSMENT     Patient is making slow, steady progress but continues to have fluctuating pain depending on activity. Patient exhibits improved lumbosacral rotation and multifidus activation.     Kalpesh Is progressing well towards his goals.   Pt prognosis is Good.     Pt will continue to benefit from skilled outpatient physical therapy to address the deficits listed in the problem list box on initial evaluation, provide pt/family education and to maximize pt's level of independence in the home and community environment.     Pt's spiritual, cultural and educational needs considered and pt agreeable to plan of care and goals.     Anticipated barriers to physical therapy: none     Goals:   Short Term Goals: 4 weeks   1. Patient will be Independent with Home Exercise Program   2. Patient will demonstrate with improved Posture and Body Mechanics  3. Patient will increase Lumbosacral Range of Motion by 25%   4. Patient will increase scapular and Core Strength to grossly 4+/5 or greater  5. Patient will decrease complaints of pain with activity to Less than or Equal to 4/10      Long Term Goals: 8 weeks   1. Patient will tolerate 30 minutes of activity with complaints of Pain at Less Than or Equal to 2/10       PLAN     Plan of care Certification: 4/20/2022 to 6/15/2022     Outpatient Physical Therapy 2 times weekly for 8 weeks to include the following interventions: Electrical Stimulation Pre-Mod, Manual Therapy, Moist Heat/  Ice, Neuromuscular Re-ed, Patient Education and Therapeutic Exercise.       CHARISSA GARCIA, PT, MLT    5/9/2022

## 2022-05-18 ENCOUNTER — CLINICAL SUPPORT (OUTPATIENT)
Dept: REHABILITATION | Facility: HOSPITAL | Age: 62
End: 2022-05-18
Payer: OTHER MISCELLANEOUS

## 2022-05-18 DIAGNOSIS — S22.000A COMPRESSION FRACTURE OF BODY OF THORACIC VERTEBRA: Primary | ICD-10-CM

## 2022-05-18 PROCEDURE — 97110 THERAPEUTIC EXERCISES: CPT | Mod: CQ

## 2022-05-18 NOTE — PROGRESS NOTES
[facility]   Physical Therapy Treatment Note      Name: Garett Banegas  [facility] Number: [id number]    Therapy Diagnosis:   Encounter Diagnosis   Name Primary?    Compression fracture of body of thoracic vertebra Yes     Physician: Jamila Valdez FNP    Visit Date: 5/18/2022    Evaluation Date: 4/20/2022  Updated Plan of Care Due : 5/19/2022  Authorization Period Expiration: 4/6/2023  Plan of Care Expiration: 6/15/2022  Visit # / Visits authorized: 4 / 16    Time In: 3:15 pm  Time Out: 4:00 pm  Total Billable Time: 45 minutes    Precautions: Standard    SUBJECTIVE     Pt reports: Pain is about a 8/10 today.   He was compliant with home exercise program.  Response to previous treatment: little soreness   Functional change: none     Pain: 8/10  Location: bilateral back      TREATMENT       Kalpesh received therapeutic exercises to develop strength, ROM and core stabilization for 45 minutes including:    Bike  5 minutes   Calf Stretch  4 x 15 seconds    Hamstring Stretch  4 x 15 seconds    Cybex Back  3 x 10 4 plates   Cybex Abdominals  3 x 10 3 plates   Cybex Leg Press -  Bilateral   3 x 10 8 plates   Cybex Leg Press -  Single   3 x 10 4 plates   Cybex Hip - Abduction  2 x 10 2 plates   Cybex Hip - Flexion     Cybex Hip - Extension     Cybex Rows- Close  2 x 10 4 plates   Cybex Rows - Wide  2 x 10 4 plates   Cybex lat pulldowns  3 x 10 2 plates   Lat stretch   4 x 15 seconds                                                  Kalpesh received the following manual therapy techniques: Joint mobilizations, Manual traction and Myofacial release were applied to the: lower back for N/A minutes, including:    Lumbosacral rotation  Multifidus activation    Kalpesh received the following supervised modalities after being cleared for contradictions:  Garett received Pre-Mod electrical stimulation for pain to the lower back. Pt received continuous mode for 0 minutes. Garett tolerated treatment well without any adverse  effects.     PATIENT EDUCATION AND HOME EXERCISES     Home Exercises Provided and Patient Education Provided     Education provided:   - encouraged to continue HEP    Written Home Exercises Provided: yes. Exercises were reviewed and Kalpesh was able to demonstrate them prior to the end of the session.  Kalpesh demonstrated good  understanding of the education provided. See EMR under Patient Instructions for exercises provided during therapy sessions    ASSESSMENT     Patient is making slow, steady progress but continues to have fluctuating pain depending on activity. Will continue to progress patient as tolerated.     Kalpesh Is progressing well towards his goals.   Pt prognosis is Good.     Pt will continue to benefit from skilled outpatient physical therapy to address the deficits listed in the problem list box on initial evaluation, provide pt/family education and to maximize pt's level of independence in the home and community environment.     Pt's spiritual, cultural and educational needs considered and pt agreeable to plan of care and goals.     Anticipated barriers to physical therapy: none     Goals:   Short Term Goals: 4 weeks   1. Patient will be Independent with Home Exercise Program   2. Patient will demonstrate with improved Posture and Body Mechanics  3. Patient will increase Lumbosacral Range of Motion by 25%   4. Patient will increase scapular and Core Strength to grossly 4+/5 or greater  5. Patient will decrease complaints of pain with activity to Less than or Equal to 4/10      Long Term Goals: 8 weeks   1. Patient will tolerate 30 minutes of activity with complaints of Pain at Less Than or Equal to 2/10       PLAN     Plan of care Certification: 4/20/2022 to 6/15/2022     Outpatient Physical Therapy 2 times weekly for 8 weeks to include the following interventions: Electrical Stimulation Pre-Mod, Manual Therapy, Moist Heat/ Ice, Neuromuscular Re-ed, Patient Education and Therapeutic Exercise.        Emerita Nevarez, PTA    5/18/2022

## 2022-05-19 ENCOUNTER — CLINICAL SUPPORT (OUTPATIENT)
Dept: REHABILITATION | Facility: HOSPITAL | Age: 62
End: 2022-05-19
Payer: OTHER MISCELLANEOUS

## 2022-05-19 DIAGNOSIS — S22.000A COMPRESSION FRACTURE OF BODY OF THORACIC VERTEBRA: Primary | ICD-10-CM

## 2022-05-19 PROCEDURE — 97110 THERAPEUTIC EXERCISES: CPT

## 2022-05-19 PROCEDURE — 97140 MANUAL THERAPY 1/> REGIONS: CPT

## 2022-05-19 NOTE — PLAN OF CARE
[facility]   Physical Therapy Updated Plan of Care       Name: Garett Banegas  [facility] Number: [id number]    Therapy Diagnosis: Lumbar strain, Thoracic myofascial strain   Physician: Jamila Valdez FNP    Visit Date: 5/19/2022    Evaluation Date: 4/20/2022  Updated Plan of Care Due : 6/18/2022  Authorization Period Expiration: 4/6/2023  Plan of Care Expiration: 6/18/2022  Visit # / Visits authorized: 6 / 16    Time In: 9:53 am  Time Out: 10:50 pm  Total Billable Time: 55 minutes    Precautions: Standard    SUBJECTIVE     Pt reports: Pain is about a 6/10 today. Some muscle soreness from working out.  He was compliant with home exercise program.  Response to previous treatment: little soreness   Functional change: none     Pain: 6/10  Location: bilateral back      TREATMENT       Kalpesh received therapeutic exercises to develop strength, ROM and core stabilization for 45 minutes including:    Bike  5 minutes   Calf Stretch  4 x 15 seconds    Hamstring Stretch  4 x 15 seconds    Cybex Back  3 x 10 4 plates   Cybex Abdominals  3 x 10 3 plates (Seat #5)   Cybex Leg Press -  Bilateral   3 x 10 8 plates   Cybex Leg Press -  Single   3 x 10 4 plates   Cybex Hip - Abduction  2 x 10 2 plates   Cybex Hip - Flexion     Cybex Hip - Extension     Cybex Rows- Close  2 x 10 4 plates   Cybex Rows - Wide  2 x 10 4 plates   Cybex lat pulldowns  3 x 10 2 plates   Lat stretch   4 x 15 seconds          Cable Rotation - Bilateral   2 x 10 with 10#                                     Kalpesh received the following manual therapy techniques: Joint mobilizations, Manual traction and Myofacial release were applied to the: lower back for 10 minutes, including:    Lumbosacral rotation  Multifidus activation    Kalpesh received the following supervised modalities after being cleared for contradictions:  Garett received Pre-Mod electrical stimulation for pain to the lower back. Pt received continuous mode for 0 minutes. Garett  tolerated treatment well without any adverse effects.     PATIENT EDUCATION AND HOME EXERCISES     Home Exercises Provided and Patient Education Provided     Education provided:   - encouraged to continue HEP    Written Home Exercises Provided: yes. Exercises were reviewed and Kalpesh was able to demonstrate them prior to the end of the session.  Kalpesh demonstrated good  understanding of the education provided. See EMR under Patient Instructions for exercises provided during therapy sessions    ASSESSMENT     Patient has received Evaluation plus 5 visits. He is making slow progress with Therapy, but has shown some improvement with Core and Lower Extremity Strength. His Activity tolerance has improved and he has a little more lumbar and thoracic mobility. However, patient still has significant pain in the Lumbar and Thoracic regions. Patient will benefit from continued Physical Therapy intervention to address all deficits and work toward completion of all goals set.       Kalpesh Is progressing well towards his goals.   Pt prognosis is Good.        Anticipated barriers to physical therapy: none     Goals:   Short Term Goals: 4 weeks   1. Patient will be Independent with Home Exercise Program - Goal Ongoing   2. Patient will demonstrate with improved Posture and Body Mechanics - Goal Ongoing   3. Patient will increase Lumbosacral Range of Motion by 25% - Goal Partially Met   4. Patient will increase scapular and Core Strength to grossly 4+/5 or greater - Goal Not Met   5. Patient will decrease complaints of pain with activity to Less than or Equal to 4/10  - Goal Not Met      Long Term Goals: 8 weeks   1. Patient will tolerate 30 minutes of activity with complaints of Pain at Less Than or Equal to 2/10  - Goal Not Met     Reasons for Recertification of Therapy: Pt will continue to benefit from skilled outpatient physical therapy to address the deficits listed in the problem list box on initial evaluation, provide  pt/family education and to maximize pt's level of independence in the home and community environment.     Plan     Updated Certification Period: 5/19/2022 to 6/18/2022  Recommended Treatment Plan: 2 times per week for 4 weeks: Cervical/Lumbar Traction, Electrical Stimulation to decrease pain and inflammation, Manual Therapy, Moist Heat/ Ice, Neuromuscular Re-ed, Patient Education and Therapeutic Exercise      NAHOMY GARCIA, PT, DPT   5/19/2022      I CERTIFY THE NEED FOR THESE SERVICES FURNISHED UNDER THIS PLAN OF TREATMENT AND WHILE UNDER MY CARE.    Physician's comments:      Physician's Signature: ___________________________________________________

## 2022-05-19 NOTE — PROGRESS NOTES
[facility]   Physical Therapy Treatment Note      Name: Garett Banegas  Clinic Number: [id number]    Therapy Diagnosis: Lumbar strain, Thoracic myofascial strain   Physician: Jamila Valdez FNP    Visit Date: 5/19/2022    Evaluation Date: 4/20/2022  Updated Plan of Care Due : 6/18/2022  Authorization Period Expiration: 4/6/2023  Plan of Care Expiration: 6/18/2022  Visit # / Visits authorized: 6 / 16    Time In: 9:53 am  Time Out: 10:50 pm  Total Billable Time: 55 minutes    Precautions: Standard    SUBJECTIVE     Pt reports: Pain is about a 6/10 today. Some muscle soreness from working out.  He was compliant with home exercise program.  Response to previous treatment: little soreness   Functional change: none     Pain: 6/10  Location: bilateral back      TREATMENT       Kalpesh received therapeutic exercises to develop strength, ROM and core stabilization for 45 minutes including:    Bike  5 minutes   Calf Stretch  4 x 15 seconds    Hamstring Stretch  4 x 15 seconds    Cybex Back  3 x 10 4 plates   Cybex Abdominals  3 x 10 3 plates (Seat #5)   Cybex Leg Press -  Bilateral   3 x 10 8 plates   Cybex Leg Press -  Single   3 x 10 4 plates   Cybex Hip - Abduction  2 x 10 2 plates   Cybex Hip - Flexion     Cybex Hip - Extension     Cybex Rows- Close  2 x 10 4 plates   Cybex Rows - Wide  2 x 10 4 plates   Cybex lat pulldowns  3 x 10 2 plates   Lat stretch   4 x 15 seconds          Cable Rotation - Bilateral   2 x 10 with 10#                                     Kalpesh received the following manual therapy techniques: Joint mobilizations, Manual traction and Myofacial release were applied to the: lower back for 10 minutes, including:    Lumbosacral rotation  Multifidus activation    Kalpesh received the following supervised modalities after being cleared for contradictions:  Garett received Pre-Mod electrical stimulation for pain to the lower back. Pt received continuous mode for 0 minutes. Garett tolerated  treatment well without any adverse effects.     PATIENT EDUCATION AND HOME EXERCISES     Home Exercises Provided and Patient Education Provided     Education provided:   - encouraged to continue HEP    Written Home Exercises Provided: yes. Exercises were reviewed and Kalpesh was able to demonstrate them prior to the end of the session.  Kalpesh demonstrated good  understanding of the education provided. See EMR under Patient Instructions for exercises provided during therapy sessions    ASSESSMENT     Patient has received Evaluation plus 5 visits. He is making slow progress with Therapy, but has shown some improvement with Core and Lower Extremity Strength. His Activity tolerance has improved and he has a little more lumbar and thoracic mobility. However, patient still has significant pain in the Lumbar and Thoracic regions. Patient will benefit from continued Physical Therapy intervention to address all deficits and work toward completion of all goals set.       Kalpesh Is progressing well towards his goals.   Pt prognosis is Good.     Pt will continue to benefit from skilled outpatient physical therapy to address the deficits listed in the problem list box on initial evaluation, provide pt/family education and to maximize pt's level of independence in the home and community environment.     Pt's spiritual, cultural and educational needs considered and pt agreeable to plan of care and goals.     Anticipated barriers to physical therapy: none     Goals:   Short Term Goals: 4 weeks   1. Patient will be Independent with Home Exercise Program - Goal Ongoing   2. Patient will demonstrate with improved Posture and Body Mechanics - Goal Ongoing   3. Patient will increase Lumbosacral Range of Motion by 25% - Goal Partially Met   4. Patient will increase scapular and Core Strength to grossly 4+/5 or greater - Goal Not Met   5. Patient will decrease complaints of pain with activity to Less than or Equal to 4/10  - Goal Not Met       Long Term Goals: 8 weeks   1. Patient will tolerate 30 minutes of activity with complaints of Pain at Less Than or Equal to 2/10  - Goal Not Met       PLAN     Updated Certification Period: 5/19/2022 to 6/18/2022  Recommended Treatment Plan: 2 times per week for 4 weeks: Cervical/Lumbar Traction, Electrical Stimulation to decrease pain and inflammation, Manual Therapy, Moist Heat/ Ice, Neuromuscular Re-ed, Patient Education and Therapeutic Exercise        NAHOMY GARCIA, PT, DPT     5/19/2022

## 2022-05-23 ENCOUNTER — CLINICAL SUPPORT (OUTPATIENT)
Dept: REHABILITATION | Facility: HOSPITAL | Age: 62
End: 2022-05-23
Payer: OTHER MISCELLANEOUS

## 2022-05-23 DIAGNOSIS — S22.000A COMPRESSION FRACTURE OF BODY OF THORACIC VERTEBRA: Primary | ICD-10-CM

## 2022-05-23 PROCEDURE — 97110 THERAPEUTIC EXERCISES: CPT | Mod: CQ

## 2022-05-23 NOTE — PROGRESS NOTES
[facility]   Physical Therapy Treatment Note      Name: Garett Banegas  Clinic Number: [id number]    Therapy Diagnosis: Lumbar strain, Thoracic myofascial strain   Physician: Jamila Valdez FNP    Visit Date: 5/23/2022    Evaluation Date: 4/20/2022  Updated Plan of Care Due : 6/18/2022  Authorization Period Expiration: 4/6/2023  Plan of Care Expiration: 6/18/2022  Visit # / Visits authorized: 7 / 16    Time In: 3:15 am  Time Out: 4:00 pm  Total Billable Time: 45 minutes    Precautions: Standard    SUBJECTIVE     Pt reports: Pain is about a 6/10 today. Some muscle soreness from working out.  He was compliant with home exercise program.  Response to previous treatment: little soreness   Functional change: none     Pain: 6/10  Location: bilateral back      TREATMENT       Kalpesh received therapeutic exercises to develop strength, ROM and core stabilization for 45 minutes including:    Bike  5 minutes   Calf Stretch  4 x 15 seconds    Hamstring Stretch  4 x 15 seconds    Cybex Back  3 x 10 4 plates   Cybex Abdominals  3 x 10 3 plates (Seat #5)   Cybex Leg Press -  Bilateral   3 x 10 8 plates   Cybex Leg Press -  Single   3 x 10 4 plates   Cybex Hip - Abduction  2 x 10 2 plates   Cybex Hip - Flexion     Cybex Hip - Extension     Cybex Rows- Close  2 x 10 4 plates   Cybex Rows - Wide  2 x 10 4 plates   Cybex lat pulldowns  3 x 10 2 plates   Lat stretch   4 x 15 seconds          Cable Rotation - Bilateral   2 x 10 with 10#                                     Kalpesh received the following manual therapy techniques: Joint mobilizations, Manual traction and Myofacial release were applied to the: lower back for N/A minutes, including:    Lumbosacral rotation  Multifidus activation    Kalpesh received the following supervised modalities after being cleared for contradictions:  Garett received Pre-Mod electrical stimulation for pain to the lower back. Pt received continuous mode for 0 minutes. Garett tolerated  treatment well without any adverse effects.     PATIENT EDUCATION AND HOME EXERCISES     Home Exercises Provided and Patient Education Provided     Education provided:   - encouraged to continue HEP    Written Home Exercises Provided: yes. Exercises were reviewed and Kalpesh was able to demonstrate them prior to the end of the session.  Kalpesh demonstrated good  understanding of the education provided. See EMR under Patient Instructions for exercises provided during therapy sessions    ASSESSMENT     Patient is slowly progressing towards goals. Patient is very determine to get back to his prior function before accident. Patient had pain throughout treatment session today. Patient states that he had a busy weekend at work this weekend and that he just got off this morning. Will continue to progress patient as tolerated.     Kalpesh Is progressing well towards his goals.   Pt prognosis is Good.     Pt will continue to benefit from skilled outpatient physical therapy to address the deficits listed in the problem list box on initial evaluation, provide pt/family education and to maximize pt's level of independence in the home and community environment.     Pt's spiritual, cultural and educational needs considered and pt agreeable to plan of care and goals.     Anticipated barriers to physical therapy: none     Goals:   Short Term Goals: 4 weeks   1. Patient will be Independent with Home Exercise Program - Goal Ongoing   2. Patient will demonstrate with improved Posture and Body Mechanics - Goal Ongoing   3. Patient will increase Lumbosacral Range of Motion by 25% - Goal Partially Met   4. Patient will increase scapular and Core Strength to grossly 4+/5 or greater - Goal Not Met   5. Patient will decrease complaints of pain with activity to Less than or Equal to 4/10  - Goal Not Met      Long Term Goals: 8 weeks   1. Patient will tolerate 30 minutes of activity with complaints of Pain at Less Than or Equal to 2/10  - Goal Not  Met       PLAN     Updated Certification Period: 5/19/2022 to 6/18/2022  Recommended Treatment Plan: 2 times per week for 4 weeks: Cervical/Lumbar Traction, Electrical Stimulation to decrease pain and inflammation, Manual Therapy, Moist Heat/ Ice, Neuromuscular Re-ed, Patient Education and Therapeutic Exercise        Emerita Nevarez, PHILLIP    5/23/2022

## 2022-06-01 ENCOUNTER — CLINICAL SUPPORT (OUTPATIENT)
Dept: REHABILITATION | Facility: HOSPITAL | Age: 62
End: 2022-06-01
Payer: OTHER MISCELLANEOUS

## 2022-06-01 DIAGNOSIS — S22.000A COMPRESSION FRACTURE OF BODY OF THORACIC VERTEBRA: Primary | ICD-10-CM

## 2022-06-01 PROCEDURE — 97140 MANUAL THERAPY 1/> REGIONS: CPT

## 2022-06-01 PROCEDURE — 97110 THERAPEUTIC EXERCISES: CPT

## 2022-06-01 NOTE — PROGRESS NOTES
[facility]   Physical Therapy Treatment Note      Name: Garett Banegas  Clinic Number: [id number]    Therapy Diagnosis: Lumbar strain, Thoracic myofascial strain   Physician: Jamila Valdez FNP    Visit Date: 6/1/2022    Evaluation Date: 4/20/2022  Updated Plan of Care Due : 6/18/2022  Authorization Period Expiration: 4/6/2023  Plan of Care Expiration: 6/18/2022  Visit # / Visits authorized: 8 / 16    Time In: 2:55 pm  Time Out: 3:50 pm  Total Billable Time: 55  minutes    Precautions: Standard    SUBJECTIVE     Pt reports: Mid and low back pain today after working past few days.  He was compliant with home exercise program.  Response to previous treatment: little soreness   Functional change: none     Pain: 7/10  Location: bilateral back      TREATMENT       Kalpesh received therapeutic exercises to develop strength, ROM and core stabilization for 45 minutes including:    Bike  5 minutes   Calf Stretch  4 x 15 seconds    Hamstring Stretch  4 x 15 seconds    Cybex Back  3 x 10 5 plates   Cybex Abdominals  3 x 10 4 plates (Seat #5)   Cybex Leg Press -  Bilateral   3 x 10 8 plates   Cybex Leg Press -  Single   3 x 10 4 plates   Cybex Hip - Abduction  2 x 10 2 plates   Cybex Hip - Flexion     Cybex Hip - Extension     Cybex Rows- Close  2 x 10 5 plates   Cybex Rows - Wide  2 x 10 5 plates   Cybex lat pulldowns  3 x 10 3 plates   Lat stretch   4 x 15 seconds          Cable Rotation - Bilateral   2 x 10 with 20#                                     Kalpesh received the following manual therapy techniques: Joint mobilizations, Manual traction and Myofacial release were applied to the: lower back for 10 minutes, including:    Lumbosacral rotation  Multifidus activation    Kalpesh received the following supervised modalities after being cleared for contradictions:  Garett received Pre-Mod electrical stimulation for pain to the lower back. Pt received continuous mode for 0 minutes. Garett tolerated treatment well  without any adverse effects.     PATIENT EDUCATION AND HOME EXERCISES     Home Exercises Provided and Patient Education Provided     Education provided:   - encouraged to continue HEP    Written Home Exercises Provided: yes. Exercises were reviewed and Kalpesh was able to demonstrate them prior to the end of the session.  Kalpesh demonstrated good  understanding of the education provided. See EMR under Patient Instructions for exercises provided during therapy sessions    ASSESSMENT     Increased weight on all exercises today. Patient tolerated increased resistance well with some shortness of breath. Patient exhibits improved lumbosacral rotation. Will continue to work on flexibility and core strength.    Kalpesh Is progressing well towards his goals.   Pt prognosis is Good.     Pt will continue to benefit from skilled outpatient physical therapy to address the deficits listed in the problem list box on initial evaluation, provide pt/family education and to maximize pt's level of independence in the home and community environment.     Pt's spiritual, cultural and educational needs considered and pt agreeable to plan of care and goals.     Anticipated barriers to physical therapy: none     Goals:   Short Term Goals: 4 weeks   1. Patient will be Independent with Home Exercise Program - Goal Ongoing   2. Patient will demonstrate with improved Posture and Body Mechanics - Goal Ongoing   3. Patient will increase Lumbosacral Range of Motion by 25% - Goal Partially Met   4. Patient will increase scapular and Core Strength to grossly 4+/5 or greater - Goal Not Met   5. Patient will decrease complaints of pain with activity to Less than or Equal to 4/10  - Goal Not Met      Long Term Goals: 8 weeks   1. Patient will tolerate 30 minutes of activity with complaints of Pain at Less Than or Equal to 2/10  - Goal Not Met       PLAN     Updated Certification Period: 5/19/2022 to 6/18/2022  Recommended Treatment Plan: 2 times per week  for 4 weeks: Cervical/Lumbar Traction, Electrical Stimulation to decrease pain and inflammation, Manual Therapy, Moist Heat/ Ice, Neuromuscular Re-ed, Patient Education and Therapeutic Exercise        CHARISSA GARCIA, PT, MLT    6/1/2022

## 2022-06-02 ENCOUNTER — CLINICAL SUPPORT (OUTPATIENT)
Dept: REHABILITATION | Facility: HOSPITAL | Age: 62
End: 2022-06-02
Payer: OTHER MISCELLANEOUS

## 2022-06-02 DIAGNOSIS — S22.000A COMPRESSION FRACTURE OF BODY OF THORACIC VERTEBRA: Primary | ICD-10-CM

## 2022-06-02 PROCEDURE — 97140 MANUAL THERAPY 1/> REGIONS: CPT

## 2022-06-02 PROCEDURE — 97110 THERAPEUTIC EXERCISES: CPT

## 2022-06-02 NOTE — PROGRESS NOTES
[facility]   Physical Therapy Treatment Note      Name: Garett Banegas  Clinic Number: [id number]    Therapy Diagnosis: Lumbar strain, Thoracic myofascial strain   Physician: Jamila Valdez FNP    Visit Date: 6/2/2022    Evaluation Date: 4/20/2022  Updated Plan of Care Due : 6/18/2022  Authorization Period Expiration: 4/6/2023  Plan of Care Expiration: 6/18/2022  Visit # / Visits authorized: 9 / 16    Time In: 7:57  am  Time Out: 8:52  am  Total Billable Time: 55  minutes    Precautions: Standard    SUBJECTIVE     Pt reports: Feels little better today.  He was compliant with home exercise program.  Response to previous treatment: little soreness   Functional change: none     Pain: 5/10  Location: bilateral back      TREATMENT       Kalpesh received therapeutic exercises to develop strength, ROM and core stabilization for 45 minutes including:    Bike  5 minutes   Calf Stretch  4 x 15 seconds    Hamstring Stretch  4 x 15 seconds    Cybex Back  3 x 10 5 plates   Cybex Abdominals  3 x 10 4 plates (Seat #5)   Cybex Leg Press -  Bilateral   3 x 10 8 plates   Cybex Leg Press -  Single   3 x 10 4 plates   Cybex Hip - Abduction  2 x 10 2 plates   Cybex Hip - Flexion     Cybex Hip - Extension     Cybex Rows- Close  2 x 10 5 plates   Cybex Rows - Wide  2 x 10 5 plates   Cybex lat pulldowns  3 x 10 3 plates   Lat stretch   4 x 15 seconds          Cable Rotation - Bilateral   2 x 10 with 20#                                     Kalpesh received the following manual therapy techniques: Joint mobilizations, Manual traction and Myofacial release were applied to the: lower back for 10 minutes, including:    Lumbosacral rotation  Multifidus activation    Kalpesh received the following supervised modalities after being cleared for contradictions:  Garett received Pre-Mod electrical stimulation for pain to the lower back. Pt received continuous mode for 0 minutes. Garett tolerated treatment well without any adverse  effects.     PATIENT EDUCATION AND HOME EXERCISES     Home Exercises Provided and Patient Education Provided     Education provided:   - encouraged to continue HEP    Written Home Exercises Provided: yes. Exercises were reviewed and Kalpesh was able to demonstrate them prior to the end of the session.  Kalpesh demonstrated good  understanding of the education provided. See EMR under Patient Instructions for exercises provided during therapy sessions    ASSESSMENT     Patient continues to have mid and low back pain. Increased weight on exercises last visit and patient is able to perform well with no increase in soreness. Patient's pain was less today and patient exhibits improved hamstring flexibility.    Kalpesh Is progressing well towards his goals.   Pt prognosis is Good.     Pt will continue to benefit from skilled outpatient physical therapy to address the deficits listed in the problem list box on initial evaluation, provide pt/family education and to maximize pt's level of independence in the home and community environment.     Pt's spiritual, cultural and educational needs considered and pt agreeable to plan of care and goals.     Anticipated barriers to physical therapy: none     Goals:   Short Term Goals: 4 weeks   1. Patient will be Independent with Home Exercise Program - Goal Ongoing   2. Patient will demonstrate with improved Posture and Body Mechanics - Goal Ongoing   3. Patient will increase Lumbosacral Range of Motion by 25% - Goal Partially Met   4. Patient will increase scapular and Core Strength to grossly 4+/5 or greater - Goal Not Met   5. Patient will decrease complaints of pain with activity to Less than or Equal to 4/10  - Goal Not Met      Long Term Goals: 8 weeks   1. Patient will tolerate 30 minutes of activity with complaints of Pain at Less Than or Equal to 2/10  - Goal Not Met       PLAN     Updated Certification Period: 5/19/2022 to 6/18/2022  Recommended Treatment Plan: 2 times per week for  4 weeks: Cervical/Lumbar Traction, Electrical Stimulation to decrease pain and inflammation, Manual Therapy, Moist Heat/ Ice, Neuromuscular Re-ed, Patient Education and Therapeutic Exercise        CHARISSA GARCIA, PT, MLT    6/2/2022

## 2022-06-15 ENCOUNTER — CLINICAL SUPPORT (OUTPATIENT)
Dept: REHABILITATION | Facility: HOSPITAL | Age: 62
End: 2022-06-15
Payer: OTHER MISCELLANEOUS

## 2022-06-15 DIAGNOSIS — S22.000A COMPRESSION FRACTURE OF BODY OF THORACIC VERTEBRA: Primary | ICD-10-CM

## 2022-06-15 PROCEDURE — 97140 MANUAL THERAPY 1/> REGIONS: CPT

## 2022-06-15 PROCEDURE — 97110 THERAPEUTIC EXERCISES: CPT

## 2022-06-15 NOTE — PROGRESS NOTES
[facility]   Physical Therapy Treatment Note      Name: Garett Banegas  Clinic Number: [id number]    Therapy Diagnosis: Lumbar strain, Thoracic myofascial strain   Physician: Jamila Valdez FNP    Visit Date: 6/15/2022    Evaluation Date: 4/20/2022  Updated Plan of Care Due : 6/18/2022  Authorization Period Expiration: 4/6/2023  Plan of Care Expiration: 6/18/2022  Visit # / Visits authorized: 10 / 16    Time In: 3:05  pm  Time Out:   pm  Total Billable Time: 55  minutes    Precautions: Standard    SUBJECTIVE     Pt reports: hurting worse today due to not having help at work and having to lift more.  He was compliant with home exercise program.  Response to previous treatment: little soreness   Functional change: none     Pain: 6/10  Location: bilateral back      TREATMENT       Kalpesh received therapeutic exercises to develop strength, ROM and core stabilization for 43 minutes including:    Bike  5 minutes   Calf Stretch  4 x 15 seconds    Hamstring Stretch  4 x 15 seconds    Cybex Back  4 x 10 5 plates   Cybex Abdominals  4 x 10 4 plates (Seat #5)   Cybex Leg Press -  Bilateral   3 x 10 8 plates   Cybex Leg Press -  Single   3 x 10 4 plates   Cybex Hip - Abduction  2 x 10 2 plates   Cybex Hip - Flexion     Cybex Hip - Extension     Cybex Rows- Close  2 x 10 5 plates   Cybex Rows - Wide  2 x 10 5 plates   Cybex lat pulldowns  3 x 10 3 plates   Lat stretch   4 x 15 seconds- NOT PERFORMED          Cable Rotation - Bilateral   2 x 10 with 20#- NOT PERFORMED                                      Kalpesh received the following manual therapy techniques: Joint mobilizations, Manual traction and Myofacial release were applied to the: lower back for 10 minutes, including:    Lumbosacral rotation  Multifidus activation    Kalpesh received the following supervised modalities after being cleared for contradictions:  Garett received Pre-Mod electrical stimulation for pain to the lower back. Pt received continuous  mode for 0 minutes. Garett tolerated treatment well without any adverse effects.     PATIENT EDUCATION AND HOME EXERCISES     Home Exercises Provided and Patient Education Provided     Education provided:   - encouraged to continue HEP    Written Home Exercises Provided: yes. Exercises were reviewed and Kalpesh was able to demonstrate them prior to the end of the session.  Kalpesh demonstrated good  understanding of the education provided. See EMR under Patient Instructions for exercises provided during therapy sessions    ASSESSMENT     Patient was having more pain today but was able to complete exercises. Continue to work on flexibility and strength. Patient is making slow, steady gains.    Kalpesh Is progressing well towards his goals.   Pt prognosis is Good.     Pt will continue to benefit from skilled outpatient physical therapy to address the deficits listed in the problem list box on initial evaluation, provide pt/family education and to maximize pt's level of independence in the home and community environment.     Pt's spiritual, cultural and educational needs considered and pt agreeable to plan of care and goals.     Anticipated barriers to physical therapy: none     Goals:   Short Term Goals: 4 weeks   1. Patient will be Independent with Home Exercise Program - Goal Ongoing   2. Patient will demonstrate with improved Posture and Body Mechanics - Goal Ongoing   3. Patient will increase Lumbosacral Range of Motion by 25% - Goal Partially Met   4. Patient will increase scapular and Core Strength to grossly 4+/5 or greater - Goal Not Met   5. Patient will decrease complaints of pain with activity to Less than or Equal to 4/10  - Goal Not Met      Long Term Goals: 8 weeks   1. Patient will tolerate 30 minutes of activity with complaints of Pain at Less Than or Equal to 2/10  - Goal Not Met       PLAN     Updated Certification Period: 5/19/2022 to 6/18/2022  Recommended Treatment Plan: 2 times per week for 4 weeks:  Cervical/Lumbar Traction, Electrical Stimulation to decrease pain and inflammation, Manual Therapy, Moist Heat/ Ice, Neuromuscular Re-ed, Patient Education and Therapeutic Exercise        CHARISSA GARCIA, PT, MLT    6/15/2022

## 2022-06-20 ENCOUNTER — CLINICAL SUPPORT (OUTPATIENT)
Dept: REHABILITATION | Facility: HOSPITAL | Age: 62
End: 2022-06-20
Payer: OTHER MISCELLANEOUS

## 2022-06-20 DIAGNOSIS — S22.000A COMPRESSION FRACTURE OF BODY OF THORACIC VERTEBRA: Primary | ICD-10-CM

## 2022-06-20 PROCEDURE — 97110 THERAPEUTIC EXERCISES: CPT

## 2022-06-20 PROCEDURE — 97140 MANUAL THERAPY 1/> REGIONS: CPT

## 2022-06-20 NOTE — PROGRESS NOTES
[facility]   Physical Therapy Treatment Note      Name: Garett Banegas  [facility] Number: [id number]    Therapy Diagnosis: Lumbar strain, Thoracic myofascial strain   Physician: Jamila Valdez FNP    Visit Date: 6/20/2022    Evaluation Date: 4/20/2022  Updated Plan of Care Due : 6/18/2022  Authorization Period Expiration: 4/6/2023  Plan of Care Expiration: 6/18/2022  Visit # / Visits authorized: 11 / 16    Time In: 3:00 pm  Time Out: 3:55 pm  Total Billable Time: 55  minutes    Precautions: Standard    SUBJECTIVE     Pt reports: Increased pain after finishing his shift of work with limited help.  He was compliant with home exercise program.  Response to previous treatment: little soreness   Functional change: none     Pain: 7/10  Location: bilateral back      TREATMENT       Kalpesh received therapeutic exercises to develop strength, ROM and core stabilization for 45 minutes including:    Bike  5 minutes   Calf Stretch  4 x 15 seconds    Hamstring Stretch  4 x 15 seconds    Cybex Back  4 x 10 5 plates   Cybex Abdominals  4 x 10 4 plates (Seat #5)   Cybex Leg Press -  Bilateral   3 x 10 8 plates   Cybex Leg Press -  Single   3 x 10 4 plates   Cybex Hip - Abduction  2 x 10 2 plates   Cybex Hip - Flexion     Cybex Hip - Extension     Cybex Rows- Close  2 x 10 5 plates   Cybex Rows - Wide  2 x 10 5 plates   Cybex lat pulldowns  3 x 10 3 plates   Lat stretch   4 x 15 seconds-          Cable Rotation - Bilateral   2 x 10 with 20#-                                      Kalpesh received the following manual therapy techniques: Joint mobilizations, Manual traction and Myofacial release were applied to the: lower back for 10 minutes, including:    Lumbosacral rotation  Multifidus activation    Kalpesh received the following supervised modalities after being cleared for contradictions:  Garett received Pre-Mod electrical stimulation for pain to the lower back. Pt received continuous mode for 0 minutes. Garett  tolerated treatment well without any adverse effects.     PATIENT EDUCATION AND HOME EXERCISES     Home Exercises Provided and Patient Education Provided     Education provided:   - encouraged to continue HEP    Written Home Exercises Provided: yes. Exercises were reviewed and Kalpesh was able to demonstrate them prior to the end of the session.  Kalpesh demonstrated good  understanding of the education provided. See EMR under Patient Instructions for exercises provided during therapy sessions    ASSESSMENT     Patient continues to have thoracic pain at site of compression fracture at T11. Patient has shown improvements in functional endurance and strength but has reached maximum rehab potential. Patient is being discharged today and is to follow up with his doctor.     Kalpesh has progressed well towards his goals except for pain goal.        Anticipated barriers to physical therapy: none     Goals:   Short Term Goals: 4 weeks   1. Patient will be Independent with Home Exercise Program - Met  2. Patient will demonstrate with improved Posture and Body Mechanics - Met  3. Patient will increase Lumbosacral Range of Motion by 25% -  Met   4. Patient will increase scapular and Core Strength to grossly 4+/5 or greater - Met   5. Patient will decrease complaints of pain with activity to Less than or Equal to 4/10  - Goal Not Met      Long Term Goals: 8 weeks   1. Patient will tolerate 30 minutes of activity with complaints of Pain at Less Than or Equal to 2/10  - Goal Not Met       PLAN     Updated Certification Period: 5/19/2022 to 6/18/2022  Recommended Treatment Plan: 2 times per week for 4 weeks: Cervical/Lumbar Traction, Electrical Stimulation to decrease pain and inflammation, Manual Therapy, Moist Heat/ Ice, Neuromuscular Re-ed, Patient Education and Therapeutic Exercise        CHARISSA GARCIA, PT, MLT    6/20/2022

## 2022-06-20 NOTE — PLAN OF CARE
[facility]   Physical Therapy Discharge Summary      Name: Garett Banegas  [facility] Number: [id number]    Therapy Diagnosis: Lumbar strain, Thoracic myofascial strain   Physician: Jamila Valdez FNP    Visit Date: 6/20/2022    Evaluation Date: 4/20/2022  Updated Plan of Care Due : 6/18/2022  Authorization Period Expiration: 4/6/2023  Plan of Care Expiration: 6/18/2022  Visit # / Visits authorized: 11 / 16    Time In: 3:00 pm  Time Out: 3:55 pm  Total Billable Time: 55  minutes    Precautions: Standard    SUBJECTIVE     Pt reports: Increased pain after finishing his shift of work with limited help.  He was compliant with home exercise program.  Response to previous treatment: little soreness   Functional change: none     Pain: 7/10  Location: bilateral back      TREATMENT       Kalpesh received therapeutic exercises to develop strength, ROM and core stabilization for 45 minutes including:    Bike  5 minutes   Calf Stretch  4 x 15 seconds    Hamstring Stretch  4 x 15 seconds    Cybex Back  4 x 10 5 plates   Cybex Abdominals  4 x 10 4 plates (Seat #5)   Cybex Leg Press -  Bilateral   3 x 10 8 plates   Cybex Leg Press -  Single   3 x 10 4 plates   Cybex Hip - Abduction  2 x 10 2 plates   Cybex Hip - Flexion     Cybex Hip - Extension     Cybex Rows- Close  2 x 10 5 plates   Cybex Rows - Wide  2 x 10 5 plates   Cybex lat pulldowns  3 x 10 3 plates   Lat stretch   4 x 15 seconds-          Cable Rotation - Bilateral   2 x 10 with 20#-                                      Kalpesh received the following manual therapy techniques: Joint mobilizations, Manual traction and Myofacial release were applied to the: lower back for 10 minutes, including:    Lumbosacral rotation  Multifidus activation    Kalpesh received the following supervised modalities after being cleared for contradictions:  Garett received Pre-Mod electrical stimulation for pain to the lower back. Pt received continuous mode for 0 minutes. Garett  tolerated treatment well without any adverse effects.     PATIENT EDUCATION AND HOME EXERCISES     Home Exercises Provided and Patient Education Provided     Education provided:   - encouraged to continue HEP    Written Home Exercises Provided: yes. Exercises were reviewed and Kalpesh was able to demonstrate them prior to the end of the session.  Kalpesh demonstrated good  understanding of the education provided. See EMR under Patient Instructions for exercises provided during therapy sessions    ASSESSMENT     Patient continues to have thoracic pain at site of compression fracture at T11. Patient has shown improvements in functional endurance and strength but has reached maximum rehab potential. Patient is being discharged today and is to follow up with his doctor.     Kalpesh has progressed well towards his goals except for pain goal.        Anticipated barriers to physical therapy: none     Goals:   Short Term Goals: 4 weeks   1. Patient will be Independent with Home Exercise Program - Met  2. Patient will demonstrate with improved Posture and Body Mechanics - Met  3. Patient will increase Lumbosacral Range of Motion by 25% -  Met   4. Patient will increase scapular and Core Strength to grossly 4+/5 or greater - Met   5. Patient will decrease complaints of pain with activity to Less than or Equal to 4/10  - Goal Not Met      Long Term Goals: 8 weeks   1. Patient will tolerate 30 minutes of activity with complaints of Pain at Less Than or Equal to 2/10  - Goal Not Met     Discharge reason: Patient has reached the maximum rehab potential for the present time    Plan   This patient is discharged from Physical Therapy.    Date of Last visit: 6/20/2022  Total Visits Received: 11  Cancelled Visits: 2  No Show Visits: 0    CHARISSA GARCIA, PT, MLT  6/20/2022

## 2022-06-24 ENCOUNTER — OFFICE VISIT (OUTPATIENT)
Dept: PRIMARY CARE CLINIC | Facility: CLINIC | Age: 62
End: 2022-06-24
Payer: OTHER MISCELLANEOUS

## 2022-06-24 VITALS
BODY MASS INDEX: 20.3 KG/M2 | SYSTOLIC BLOOD PRESSURE: 139 MMHG | OXYGEN SATURATION: 96 % | WEIGHT: 145 LBS | DIASTOLIC BLOOD PRESSURE: 87 MMHG | HEIGHT: 71 IN | HEART RATE: 83 BPM | RESPIRATION RATE: 20 BRPM | TEMPERATURE: 98 F

## 2022-06-24 DIAGNOSIS — S39.012D LUMBAR STRAIN, SUBSEQUENT ENCOUNTER: Primary | ICD-10-CM

## 2022-06-24 DIAGNOSIS — S29.019D THORACIC MYOFASCIAL STRAIN, SUBSEQUENT ENCOUNTER: ICD-10-CM

## 2022-06-24 PROCEDURE — 99214 PR OFFICE/OUTPT VISIT, EST, LEVL IV, 30-39 MIN: ICD-10-PCS | Mod: ,,, | Performed by: NURSE PRACTITIONER

## 2022-06-24 PROCEDURE — 99214 OFFICE O/P EST MOD 30 MIN: CPT | Mod: ,,, | Performed by: NURSE PRACTITIONER

## 2022-06-24 NOTE — PROGRESS NOTES
Subjective:       Patient ID: Garett Banegas is a 62 y.o. male.    Chief Complaint: Work Related Injury (Patient present here today with work related injury to mid and lower back that occurred on 1/29/22. Patient still c/o back pain.)    61 y/o wm presents for follow up mid  Back pain with radiation to buttocks. He has completed PT and says it has not helped. He is currently a patient of Dr. Cordero for Lumbar issues. He sees Dr. Cordero every 2 months. He denies numbness, loss of bowel / bladder and saddle parasthesia.     Review of Systems   Cardiovascular: Negative for leg swelling.   Gastrointestinal: Negative for fecal incontinence.   Genitourinary: Negative for bladder incontinence.   Neurological: Negative for numbness.   All other systems reviewed and are negative.        Objective:      Physical Exam  Vitals and nursing note reviewed.   Constitutional:       Appearance: Normal appearance.   HENT:      Head: Normocephalic.   Eyes:      Pupils: Pupils are equal, round, and reactive to light.   Cardiovascular:      Rate and Rhythm: Normal rate and regular rhythm.      Heart sounds: Normal heart sounds.   Pulmonary:      Effort: Pulmonary effort is normal.      Breath sounds: Normal breath sounds.   Musculoskeletal:         General: No swelling or tenderness. Normal range of motion.      Cervical back: Normal range of motion.        Back:    Skin:     General: Skin is warm and dry.   Neurological:      General: No focal deficit present.      Mental Status: He is alert and oriented to person, place, and time.   Psychiatric:         Attention and Perception: Attention and perception normal.         Mood and Affect: Mood normal.         Speech: Speech normal.         Behavior: Behavior normal. Behavior is cooperative.         Cognition and Memory: Cognition normal.         Judgment: Judgment normal.         Assessment:       Problem List Items Addressed This Visit        Orthopedic    Thoracic myofascial strain        Other    Lumbar strain, subsequent encounter - Primary          Plan:       I will call Dr. Cordero and see about him seeing this patient for workers complensation injury to thoracic spine. RTW modified duty, no lifting over 10-15 lbs. Continue meds.

## 2023-07-31 ENCOUNTER — TELEPHONE (OUTPATIENT)
Dept: FAMILY MEDICINE | Facility: CLINIC | Age: 63
End: 2023-07-31
Payer: OTHER MISCELLANEOUS

## 2023-07-31 DIAGNOSIS — U07.1 COVID: Primary | ICD-10-CM

## 2023-07-31 RX ORDER — NIRMATRELVIR AND RITONAVIR 300-100 MG
KIT ORAL
Qty: 30 TABLET | Refills: 0 | Status: SHIPPED | OUTPATIENT
Start: 2023-07-31 | End: 2023-08-05

## 2023-08-15 ENCOUNTER — OFFICE VISIT (OUTPATIENT)
Dept: FAMILY MEDICINE | Facility: CLINIC | Age: 63
End: 2023-08-15
Payer: COMMERCIAL

## 2023-08-15 VITALS
TEMPERATURE: 99 F | HEART RATE: 73 BPM | RESPIRATION RATE: 20 BRPM | BODY MASS INDEX: 18.39 KG/M2 | OXYGEN SATURATION: 95 % | DIASTOLIC BLOOD PRESSURE: 70 MMHG | SYSTOLIC BLOOD PRESSURE: 104 MMHG | WEIGHT: 131.38 LBS | HEIGHT: 71 IN

## 2023-08-15 DIAGNOSIS — J40 BRONCHITIS: Primary | ICD-10-CM

## 2023-08-15 PROCEDURE — 96372 PR INJECTION,THERAP/PROPH/DIAG2ST, IM OR SUBCUT: ICD-10-PCS | Mod: ,,, | Performed by: FAMILY MEDICINE

## 2023-08-15 PROCEDURE — 96372 THER/PROPH/DIAG INJ SC/IM: CPT | Mod: ,,, | Performed by: FAMILY MEDICINE

## 2023-08-15 PROCEDURE — 99213 PR OFFICE/OUTPT VISIT, EST, LEVL III, 20-29 MIN: ICD-10-PCS | Mod: 25,,, | Performed by: FAMILY MEDICINE

## 2023-08-15 PROCEDURE — 99051 PR MEDICAL SERVICES, EVE/WKEND/HOLIDAY: ICD-10-PCS | Mod: ,,, | Performed by: FAMILY MEDICINE

## 2023-08-15 PROCEDURE — 99213 OFFICE O/P EST LOW 20 MIN: CPT | Mod: 25,,, | Performed by: FAMILY MEDICINE

## 2023-08-15 PROCEDURE — 99051 MED SERV EVE/WKEND/HOLIDAY: CPT | Mod: ,,, | Performed by: FAMILY MEDICINE

## 2023-08-15 RX ORDER — DEXAMETHASONE SODIUM PHOSPHATE 4 MG/ML
4 INJECTION, SOLUTION INTRA-ARTICULAR; INTRALESIONAL; INTRAMUSCULAR; INTRAVENOUS; SOFT TISSUE
Status: COMPLETED | OUTPATIENT
Start: 2023-08-15 | End: 2023-08-15

## 2023-08-15 RX ORDER — LEVOFLOXACIN 500 MG/1
500 TABLET, FILM COATED ORAL DAILY
Qty: 7 TABLET | Refills: 0 | Status: SHIPPED | OUTPATIENT
Start: 2023-08-15 | End: 2023-08-22

## 2023-08-15 RX ORDER — TIOTROPIUM BROMIDE 18 UG/1
18 CAPSULE ORAL; RESPIRATORY (INHALATION) DAILY
Qty: 30 CAPSULE | Refills: 5 | Status: SHIPPED | OUTPATIENT
Start: 2023-08-15 | End: 2023-08-30

## 2023-08-15 RX ORDER — PREDNISONE 20 MG/1
TABLET ORAL
Qty: 15 TABLET | Refills: 0 | Status: SHIPPED | OUTPATIENT
Start: 2023-08-15 | End: 2023-08-30

## 2023-08-15 RX ADMIN — DEXAMETHASONE SODIUM PHOSPHATE 4 MG: 4 INJECTION, SOLUTION INTRA-ARTICULAR; INTRALESIONAL; INTRAMUSCULAR; INTRAVENOUS; SOFT TISSUE at 06:08

## 2023-08-15 NOTE — PROGRESS NOTES
Subjective:       Patient ID: Garett Banegas is a 63 y.o. male.    Chief Complaint: Cough, Chest Congestion (Ever since he got covid aug. 1 ), and Shortness of Breath    HPI  Review of Systems   Constitutional:  Negative for activity change, appetite change, chills, diaphoresis, fatigue, fever and unexpected weight change.   HENT:  Positive for nasal congestion, postnasal drip, rhinorrhea and sinus pressure/congestion. Negative for dental problem, drooling, ear discharge, ear pain, facial swelling, hearing loss, mouth sores, nosebleeds, sneezing, sore throat, tinnitus, trouble swallowing, voice change and goiter.    Eyes:  Negative for photophobia, pain, discharge, redness, itching and visual disturbance.   Respiratory:  Positive for cough, shortness of breath and wheezing. Negative for apnea, choking, chest tightness and stridor.    Cardiovascular:  Negative for chest pain, palpitations, leg swelling and claudication.   Gastrointestinal:  Negative for abdominal distention, abdominal pain, anal bleeding, blood in stool, change in bowel habit, constipation, diarrhea, nausea, vomiting, reflux, fecal incontinence and change in bowel habit.   Endocrine: Negative for cold intolerance, heat intolerance, polydipsia, polyphagia and polyuria.   Genitourinary:  Negative for bladder incontinence, decreased urine volume, difficulty urinating, discharge, dysuria, enuresis, erectile dysfunction, flank pain, frequency, genital sores, hematuria, penile pain, testicular pain and urgency.   Musculoskeletal:  Negative for arthralgias, back pain, gait problem, joint swelling, leg pain, myalgias, neck pain, neck stiffness and joint deformity.   Integumentary:  Negative for pallor, rash, wound and mole/lesion.   Allergic/Immunologic: Negative for environmental allergies, food allergies and frequent infections.   Neurological:  Negative for dizziness, vertigo, tremors, seizures, syncope, facial asymmetry, speech difficulty, weakness,  light-headedness, numbness, headaches, coordination difficulties, memory loss and coordination difficulties.   Hematological:  Negative for adenopathy. Does not bruise/bleed easily.   Psychiatric/Behavioral:  Negative for agitation, behavioral problems, confusion, decreased concentration, dysphoric mood, hallucinations, self-injury, sleep disturbance and suicidal ideas. The patient is not nervous/anxious and is not hyperactive.          Objective:      Physical Exam  Vitals reviewed.   Constitutional:       Appearance: Normal appearance. He is normal weight.   HENT:      Head: Normocephalic and atraumatic.      Right Ear: Tympanic membrane and ear canal normal.      Left Ear: Tympanic membrane, ear canal and external ear normal.      Nose: Congestion and rhinorrhea present.      Mouth/Throat:      Mouth: Mucous membranes are moist.      Pharynx: Oropharynx is clear. Posterior oropharyngeal erythema present.   Eyes:      Extraocular Movements: Extraocular movements intact.      Conjunctiva/sclera: Conjunctivae normal.      Pupils: Pupils are equal, round, and reactive to light.   Cardiovascular:      Rate and Rhythm: Normal rate and regular rhythm.      Pulses: Normal pulses.      Heart sounds: Normal heart sounds.   Pulmonary:      Effort: Pulmonary effort is normal.      Breath sounds: Wheezing and rhonchi present.   Abdominal:      General: Abdomen is flat. Bowel sounds are normal.      Palpations: Abdomen is soft.   Musculoskeletal:         General: Normal range of motion.      Cervical back: Normal range of motion and neck supple.   Skin:     General: Skin is warm and dry.   Neurological:      General: No focal deficit present.      Mental Status: He is alert and oriented to person, place, and time. Mental status is at baseline.   Psychiatric:         Mood and Affect: Mood normal.         Behavior: Behavior normal.         Thought Content: Thought content normal.         Judgment: Judgment normal.          Assessment:       1. Bronchitis        Plan:     Bronchitis  -     dexAMETHasone injection 4 mg  -     predniSONE (DELTASONE) 20 MG tablet; Take 2 tablets by mouth daily x 5 days then take 1 tablet by mouth daily x 5 days  Dispense: 15 tablet; Refill: 0  -     levoFLOXacin (LEVAQUIN) 500 MG tablet; Take 1 tablet (500 mg total) by mouth once daily. for 7 days  Dispense: 7 tablet; Refill: 0  -     budesonide (PULMICORT FLEXHALER) 90 mcg/actuation AePB; Inhale 2 puffs (180 mcg total) into the lungs 2 (two) times a day. Controller  Dispense: 1 each; Refill: 2  -     tiotropium (SPIRIVA) 18 mcg inhalation capsule; Inhale 1 capsule (18 mcg total) into the lungs once daily. Controller  Dispense: 30 capsule; Refill: 5

## 2023-08-29 ENCOUNTER — OFFICE VISIT (OUTPATIENT)
Dept: FAMILY MEDICINE | Facility: CLINIC | Age: 63
End: 2023-08-29
Payer: COMMERCIAL

## 2023-08-29 VITALS
SYSTOLIC BLOOD PRESSURE: 110 MMHG | RESPIRATION RATE: 17 BRPM | HEART RATE: 69 BPM | HEIGHT: 71 IN | OXYGEN SATURATION: 96 % | BODY MASS INDEX: 19.04 KG/M2 | DIASTOLIC BLOOD PRESSURE: 86 MMHG | WEIGHT: 136 LBS | TEMPERATURE: 99 F

## 2023-08-29 DIAGNOSIS — J22 LOWER RESPIRATORY INFECTION: Primary | ICD-10-CM

## 2023-08-29 DIAGNOSIS — R91.8 LUNG MASS: ICD-10-CM

## 2023-08-29 PROCEDURE — 99213 PR OFFICE/OUTPT VISIT, EST, LEVL III, 20-29 MIN: ICD-10-PCS | Mod: ,,, | Performed by: FAMILY MEDICINE

## 2023-08-29 PROCEDURE — 99213 OFFICE O/P EST LOW 20 MIN: CPT | Mod: ,,, | Performed by: FAMILY MEDICINE

## 2023-08-29 NOTE — PROGRESS NOTES
Subjective     Patient ID: Garett Banegas is a 63 y.o. male.    Chief Complaint: Cough (Productive cough for a wk. No fever. Recently had covid 08/01/2023.), Nasal Congestion (Stuffy nose.), Chest Congestion (/), and Generalized Body Aches    Patient is here 2 weeks ago with similar symptoms.  Treat with levofloxacin.  He has had no fever.  He is coughing up a little purulent sputum.    Cough    Review of Systems   Respiratory:  Positive for cough.           Objective     Physical Exam  Constitutional:       Appearance: He is not ill-appearing or toxic-appearing.   HENT:      Nose: Congestion and rhinorrhea present.      Mouth/Throat:      Pharynx: No posterior oropharyngeal erythema.   Cardiovascular:      Rate and Rhythm: Normal rate and regular rhythm.   Pulmonary:      Effort: Pulmonary effort is normal.      Breath sounds: Wheezing and rhonchi (few very faint scattered rhonchi and wheezes) present.   Musculoskeletal:      Right lower leg: Edema present.      Left lower leg: No edema.   Neurological:      Mental Status: He is alert.          Assessment and Plan     1. Lower respiratory infection  -     X-Ray Chest PA And Lateral; Future; Expected date: 08/29/2023    2. Lung mass        Chest x-ray shows a mass with air-fluid level in right upper lobe.  i contacted Dr. Lang.  He will see patient in his clinic tomorrow         No follow-ups on file.

## 2023-08-29 NOTE — LETTER
August 29, 2023      [facility]  [address] [facility] [place] [address]  Phone: [phone]  Fax: [phone]       Patient: Garett Banegas   YOB: 1960  Date of Visit: 08/29/2023    To Whom It May Concern:    Clovis Banegas  was at [facility] on 08/29/2023. The patient may return to work on 08/31/2023 with no restrictions. If you have any questions or concerns, or if I can be of further assistance, please do not hesitate to contact me.    Sincerely,    Stella Mcmillan MA

## 2023-08-30 ENCOUNTER — OFFICE VISIT (OUTPATIENT)
Dept: PULMONOLOGY | Facility: CLINIC | Age: 63
End: 2023-08-30
Payer: COMMERCIAL

## 2023-08-30 VITALS
HEART RATE: 74 BPM | RESPIRATION RATE: 22 BRPM | OXYGEN SATURATION: 97 % | DIASTOLIC BLOOD PRESSURE: 67 MMHG | BODY MASS INDEX: 19.04 KG/M2 | WEIGHT: 136 LBS | SYSTOLIC BLOOD PRESSURE: 103 MMHG | HEIGHT: 71 IN

## 2023-08-30 DIAGNOSIS — J85.1 ABSCESS OF UPPER LOBE OF RIGHT LUNG WITH PNEUMONIA: ICD-10-CM

## 2023-08-30 DIAGNOSIS — R91.8 LUNG MASS: ICD-10-CM

## 2023-08-30 PROCEDURE — 99215 OFFICE O/P EST HI 40 MIN: CPT | Mod: PBBFAC | Performed by: INTERNAL MEDICINE

## 2023-08-30 PROCEDURE — 99204 PR OFFICE/OUTPT VISIT, NEW, LEVL IV, 45-59 MIN: ICD-10-PCS | Mod: S$PBB,,, | Performed by: INTERNAL MEDICINE

## 2023-08-30 PROCEDURE — 99204 OFFICE O/P NEW MOD 45 MIN: CPT | Mod: S$PBB,,, | Performed by: INTERNAL MEDICINE

## 2023-08-30 RX ORDER — CLINDAMYCIN HYDROCHLORIDE 300 MG/1
300 CAPSULE ORAL EVERY 6 HOURS
Qty: 42 CAPSULE | Refills: 0 | Status: ON HOLD | OUTPATIENT
Start: 2023-08-30 | End: 2023-10-12 | Stop reason: ALTCHOICE

## 2023-08-30 NOTE — ASSESSMENT & PLAN NOTE
Patient presents with a mass with a right upper lobe air-fluid level recent bouts of COVID the differential includes lung cancer versus TB versus fungus versus lung abscess were going to proceed with a bronchoscopy is soon as we get approval from insurance company and there will start him on clindamycin and follows x-ray serially will rule out tuberculosis specifically I have discussed the case with him and he understands and agrees to the procedure informed consent obtained

## 2023-08-30 NOTE — H&P (VIEW-ONLY)
Subjective:       Patient ID: Garett Banegas is a 63 y.o. male.    Chief Complaint: Lung Mass, Sputum Production, Cough, Shortness of Breath (With exertion ), and COPD (Was diagnosed )    Cough  This is a chronic problem. The current episode started 1 to 4 weeks ago. The problem has been unchanged. Associated symptoms include shortness of breath. Pertinent negatives include no chest pain, chills, ear pain, eye redness, headaches or rash. His past medical history is significant for COPD.   Shortness of Breath  Pertinent negatives include no abdominal pain, chest pain, ear pain, headaches or rash. His past medical history is significant for COPD.   COPD  Associated symptoms include coughing. Pertinent negatives include no abdominal pain, arthralgias, chest pain, chills, congestion, headaches or rash.     Past Medical History:   Diagnosis Date    Asthma     COPD (chronic obstructive pulmonary disease)     Hyperlipidemia     Hypertension     Low back pain      Past Surgical History:   Procedure Laterality Date    SINUS SURGERY       Family History   Problem Relation Age of Onset    Cancer Other     Diabetes Other     Heart disease Other     Hypertension Other     Stroke Other     Epilepsy Other      Review of patient's allergies indicates:   Allergen Reactions    Penicillins Rash     Other reaction(s): Hives, Unknown      Social History     Tobacco Use    Smoking status: Every Day    Smokeless tobacco: Never   Substance Use Topics    Alcohol use: Yes    Drug use: Never      Review of Systems   Constitutional:  Negative for chills, activity change and night sweats.   HENT:  Negative for congestion and ear pain.    Eyes:  Negative for redness and itching.   Respiratory:  Positive for cough and shortness of breath.    Cardiovascular:  Negative for chest pain and palpitations.   Musculoskeletal:  Negative for arthralgias and back pain.   Skin:  Negative for rash.   Gastrointestinal:  Negative for abdominal pain and  "abdominal distention.   Neurological:  Negative for dizziness and headaches.   Hematological:  Negative for adenopathy. Does not bruise/bleed easily.   Psychiatric/Behavioral:  Negative for confusion. The patient is not nervous/anxious.        Objective:      Physical Exam   Constitutional: He is oriented to person, place, and time. He appears well-developed and well-nourished.   HENT:   Head: Normocephalic.   Nose: Nose normal.   Mouth/Throat: Oropharynx is clear and moist.   Neck: No JVD present. No thyromegaly present.   Cardiovascular: Normal rate, regular rhythm, normal heart sounds and intact distal pulses.   Pulmonary/Chest: Normal expansion, hyperinflation, symmetric chest wall expansion, effort normal and breath sounds normal.   Abdominal: Soft. Bowel sounds are normal.   Musculoskeletal:         General: Normal range of motion.      Cervical back: Normal range of motion and neck supple.   Lymphadenopathy: No supraclavicular adenopathy is present.     He has no cervical adenopathy.   Neurological: He is alert and oriented to person, place, and time. He has normal reflexes.   Skin: Skin is warm and dry.   Psychiatric: He has a normal mood and affect. His behavior is normal.     Personal Diagnostic Review  Reviewed chest x-ray lung abscess        8/30/2023     1:04 PM 8/29/2023    12:38 PM 8/15/2023     6:00 PM 6/24/2022    10:26 AM 4/6/2022     2:33 PM 3/28/2022     3:23 PM 2/8/2022     9:42 AM   Pulmonary Function Tests   SpO2 97 % 96 % 95 % 96 % 98 % 98 % 97 %   Height 5' 11" (1.803 m) 5' 11" (1.803 m) 5' 11" (1.803 m) 5' 11" (1.803 m) 5' 11" (1.803 m) 5' 11" (1.803 m) 5' 11" (1.803 m)   Weight 61.7 kg (136 lb) 61.7 kg (136 lb) 59.6 kg (131 lb 6.4 oz) 65.8 kg (145 lb) 65.8 kg (145 lb) 65.8 kg (145 lb) 65.8 kg (145 lb)   BMI (Calculated) 19 19 18.3 20.2 20.2 20.2 20.2         Assessment:       1. Lung mass    2. Abscess of upper lobe of right lung with pneumonia        Outpatient Encounter Medications as of " 8/30/2023   Medication Sig Dispense Refill    albuterol (PROVENTIL HFA) 90 mcg/actuation inhaler Inhale 2 puffs into the lungs every 6 (six) hours as needed for Wheezing. Rescue 18 g 11    aspirin (ECOTRIN) 81 MG EC tablet Take 81 mg by mouth once daily.      budesonide (PULMICORT FLEXHALER) 90 mcg/actuation AePB Inhale 2 puffs (180 mcg total) into the lungs 2 (two) times a day. Controller 1 each 2    carvediloL (COREG) 6.25 MG tablet Take 1 tablet by mouth once daily.      clopidogreL (PLAVIX) 75 mg tablet Take 75 mg by mouth once daily.      losartan (COZAAR) 50 MG tablet Take 50 mg by mouth once daily.      rosuvastatin (CRESTOR) 20 MG tablet Take 1 tablet (20 mg total) by mouth once daily. 90 tablet 3    clindamycin (CLEOCIN) 300 MG capsule Take 1 capsule (300 mg total) by mouth every 6 (six) hours. 42 capsule 0    [DISCONTINUED] amLODIPine (NORVASC) 5 MG tablet Take 1 tablet by mouth once daily.      [DISCONTINUED] ibuprofen (ADVIL,MOTRIN) 800 MG tablet Take 1 tablet (800 mg total) by mouth every 6 (six) hours as needed for Pain. 30 tablet 0    [DISCONTINUED] irbesartan (AVAPRO) 150 MG tablet Take 1 tablet by mouth once daily.      [DISCONTINUED] methocarbamoL (ROBAXIN) 500 MG Tab Take 1 tablet (500 mg total) by mouth 4 (four) times daily. 30 tablet 0    [DISCONTINUED] morphine (MS CONTIN) 30 MG 12 hr tablet Take 30 mg by mouth every 12 (twelve) hours.      [DISCONTINUED] plecanatide (TRULANCE) 3 mg Tab Take 3 mg by mouth once daily. 90 tablet 3    [DISCONTINUED] predniSONE (DELTASONE) 20 MG tablet Take 2 tablets by mouth daily x 5 days then take 1 tablet by mouth daily x 5 days 15 tablet 0    [DISCONTINUED] tiotropium (SPIRIVA) 18 mcg inhalation capsule Inhale 1 capsule (18 mcg total) into the lungs once daily. Controller 30 capsule 5    [DISCONTINUED] tuberculin (TUBERSOL) 5 tub. unit /0.1 mL injection 0.1 mLs.      [DISCONTINUED] umeclidinium-vilanteroL (ANORO ELLIPTA) 62.5-25 mcg/actuation DsDv Inhale 1  puff into the lungs once daily. Controller 60 each 11     Facility-Administered Encounter Medications as of 8/30/2023   Medication Dose Route Frequency Provider Last Rate Last Admin    [DISCONTINUED] acetaminophen tablet 650 mg  650 mg Oral Once PRGiancarlo Vega MD        [DISCONTINUED] acetaminophen tablet 650 mg  650 mg Oral Once PRGiancarlo Vega MD        [DISCONTINUED] albuterol inhaler 2 puff  2 puff Inhalation Q20 Min Giancarlo Landaverde MD        [DISCONTINUED] albuterol inhaler 2 puff  2 puff Inhalation Q20 Min Giancarlo Landaverde MD        [DISCONTINUED] diphenhydrAMINE injection 25 mg  25 mg Intravenous Once PRN Giancarlo Azar MD        [DISCONTINUED] diphenhydrAMINE injection 25 mg  25 mg Intravenous Once PRGiancarlo Vega MD        [DISCONTINUED] EPINEPHrine (EPIPEN) 0.3 mg/0.3 mL pen injection 0.3 mg  0.3 mg Intramuscular PRGiancarlo Vega MD        [DISCONTINUED] EPINEPHrine (EPIPEN) 0.3 mg/0.3 mL pen injection 0.3 mg  0.3 mg Intramuscular PRGiancarlo Vega MD        [DISCONTINUED] methylPREDNISolone sodium succinate injection 40 mg  40 mg Intravenous Once PRN Giancarlo Azar MD        [DISCONTINUED] methylPREDNISolone sodium succinate injection 40 mg  40 mg Intravenous Once PRGiancarlo Vega MD        [DISCONTINUED] ondansetron disintegrating tablet 4 mg  4 mg Oral Once PRGiancarlo Vega MD        [DISCONTINUED] ondansetron disintegrating tablet 4 mg  4 mg Oral Once PRN Giancarlo Azar MD        [DISCONTINUED] sodium chloride 0.9% 500 mL flush bag   Intravenous Giancarlo Landaverde MD        [DISCONTINUED] sodium chloride 0.9% 500 mL flush bag   Intravenous PRGiancarlo Vega MD        [DISCONTINUED] sodium chloride 0.9% flush 10 mL  10 mL Intravenous PRGiancarlo Vega MD        [DISCONTINUED] sodium chloride 0.9% flush 10 mL  10 mL Intravenous PRGiancarlo Vega MD         Orders Placed This Encounter   Procedures    X-Ray Chest PA And Lateral      Standing Status:   Future     Standing Expiration Date:   8/30/2024     Order Specific Question:   May the Radiologist modify the order per protocol to meet the clinical needs of the patient?     Answer:   Yes       Plan:       Problem List Items Addressed This Visit          Pulmonary    Abscess of upper lobe of right lung with pneumonia     Patient presents with a mass with a right upper lobe air-fluid level recent bouts of COVID the differential includes lung cancer versus TB versus fungus versus lung abscess were going to proceed with a bronchoscopy is soon as we get approval from insurance company and there will start him on clindamycin and follows x-ray serially will rule out tuberculosis specifically I have discussed the case with him and he understands and agrees to the procedure informed consent obtained          Other Visit Diagnoses       Lung mass        Relevant Medications    clindamycin (CLEOCIN) 300 MG capsule    Other Relevant Orders    BRONCHOSCOPY    X-Ray Chest PA And Lateral

## 2023-08-30 NOTE — LETTER
August 30, 2023      [facility]  [address] [place] [address]  Phone: [phone]  Fax: [phone]       Patient: Garett Banegas   YOB: 1960  Date of Visit: 08/30/2023    To Whom It May Concern:    Clovis Banegas  was at  on 08/30/2023. The patient may return to work/school on 09/02/23 with no restrictions. If you have any questions or concerns, or if I can be of further assistance, please do not hesitate to contact me.    Sincerely,    Zaire Bronson MA

## 2023-09-01 ENCOUNTER — HOSPITAL ENCOUNTER (OUTPATIENT)
Dept: GASTROENTEROLOGY | Facility: HOSPITAL | Age: 63
Discharge: HOME | End: 2023-09-01
Attending: INTERNAL MEDICINE
Payer: COMMERCIAL

## 2023-09-01 VITALS
WEIGHT: 135 LBS | DIASTOLIC BLOOD PRESSURE: 70 MMHG | BODY MASS INDEX: 18.83 KG/M2 | SYSTOLIC BLOOD PRESSURE: 110 MMHG | OXYGEN SATURATION: 99 % | HEART RATE: 82 BPM | RESPIRATION RATE: 15 BRPM

## 2023-09-01 DIAGNOSIS — R91.8 LUNG MASS: ICD-10-CM

## 2023-09-01 LAB — DIRECT PREP: NORMAL

## 2023-09-01 PROCEDURE — 87102 FUNGUS ISOLATION CULTURE: CPT | Performed by: INTERNAL MEDICINE

## 2023-09-01 PROCEDURE — 63600175 PHARM REV CODE 636 W HCPCS: Performed by: INTERNAL MEDICINE

## 2023-09-01 PROCEDURE — 25000003 PHARM REV CODE 250

## 2023-09-01 PROCEDURE — 31622 DX BRONCHOSCOPE/WASH: CPT

## 2023-09-01 PROCEDURE — 87206 SMEAR FLUORESCENT/ACID STAI: CPT | Performed by: INTERNAL MEDICINE

## 2023-09-01 PROCEDURE — 87210 SMEAR WET MOUNT SALINE/INK: CPT | Performed by: INTERNAL MEDICINE

## 2023-09-01 PROCEDURE — 87116 MYCOBACTERIA CULTURE: CPT | Performed by: INTERNAL MEDICINE

## 2023-09-01 PROCEDURE — 25000003 PHARM REV CODE 250: Performed by: INTERNAL MEDICINE

## 2023-09-01 PROCEDURE — 87070 CULTURE OTHR SPECIMN AEROBIC: CPT | Performed by: INTERNAL MEDICINE

## 2023-09-01 PROCEDURE — 31622 DX BRONCHOSCOPE/WASH: CPT | Mod: ,,, | Performed by: INTERNAL MEDICINE

## 2023-09-01 PROCEDURE — G0500 MOD SEDAT ENDO SERVICE >5YRS: HCPCS

## 2023-09-01 PROCEDURE — 31622 PR BRONCHOSCOPY,DIAGNOSTIC: ICD-10-PCS | Mod: ,,, | Performed by: INTERNAL MEDICINE

## 2023-09-01 RX ORDER — LIDOCAINE HYDROCHLORIDE 10 MG/ML
10 INJECTION INFILTRATION; PERINEURAL ONCE
Status: COMPLETED | OUTPATIENT
Start: 2023-09-01 | End: 2023-09-01

## 2023-09-01 RX ORDER — MEPERIDINE HYDROCHLORIDE 100 MG/ML
INJECTION INTRAMUSCULAR; INTRAVENOUS; SUBCUTANEOUS
Status: COMPLETED | OUTPATIENT
Start: 2023-09-01 | End: 2023-09-01

## 2023-09-01 RX ORDER — MIDAZOLAM HYDROCHLORIDE 1 MG/ML
INJECTION INTRAMUSCULAR; INTRAVENOUS
Status: COMPLETED | OUTPATIENT
Start: 2023-09-01 | End: 2023-09-01

## 2023-09-01 RX ADMIN — Medication 25 MG: at 07:09

## 2023-09-01 RX ADMIN — MIDAZOLAM HYDROCHLORIDE 2 MG: 1 INJECTION, SOLUTION INTRAMUSCULAR; INTRAVENOUS at 07:09

## 2023-09-01 RX ADMIN — LIDOCAINE HYDROCHLORIDE 10 ML: 10 INJECTION, SOLUTION INFILTRATION; PERINEURAL at 07:09

## 2023-09-01 NOTE — BRIEF OP NOTE
Procedure bronchoscopy  Diagnosis lung abscess     Culture sent   No difficulties   Follow up in clinic

## 2023-09-01 NOTE — DISCHARGE INSTRUCTIONS
NOTIFY  IF: INCREASED SHORTNESS OF BREATH, CHEST PAIN, CHILLS AND/OR FEVER, BLEEDING OVER 3 TABLESPOONS.   NO DRIVING, OPERATING EQUIPMENT, OR SIGNING LEGAL DOCUMENTS FOR 24 HOURS.     DO NOT EAT OR DRINK ANYTHING UNTIL AFTER 930AM  DUE TO THROAT BEING NUMB FROM MEDICATION USED DURING PROCEDURE.

## 2023-09-02 LAB — AFB SMEAR (FA): NORMAL

## 2023-09-03 LAB — CULTURE, LOWER RESPIRATORY: NORMAL

## 2023-09-18 ENCOUNTER — OFFICE VISIT (OUTPATIENT)
Dept: PULMONOLOGY | Facility: CLINIC | Age: 63
End: 2023-09-18
Payer: COMMERCIAL

## 2023-09-18 ENCOUNTER — HOSPITAL ENCOUNTER (OUTPATIENT)
Dept: RADIOLOGY | Facility: HOSPITAL | Age: 63
Discharge: HOME | End: 2023-09-18
Attending: INTERNAL MEDICINE
Payer: COMMERCIAL

## 2023-09-18 VITALS
WEIGHT: 135 LBS | RESPIRATION RATE: 20 BRPM | HEIGHT: 71 IN | BODY MASS INDEX: 18.9 KG/M2 | HEART RATE: 68 BPM | SYSTOLIC BLOOD PRESSURE: 126 MMHG | OXYGEN SATURATION: 97 % | DIASTOLIC BLOOD PRESSURE: 62 MMHG

## 2023-09-18 DIAGNOSIS — R06.02 SOB (SHORTNESS OF BREATH): ICD-10-CM

## 2023-09-18 DIAGNOSIS — J85.1 ABSCESS OF UPPER LOBE OF RIGHT LUNG WITH PNEUMONIA: Primary | ICD-10-CM

## 2023-09-18 DIAGNOSIS — R91.8 LUNG MASS: ICD-10-CM

## 2023-09-18 DIAGNOSIS — J85.3 ABSCESS OF MEDIASTINUM: ICD-10-CM

## 2023-09-18 PROCEDURE — 71046 X-RAY EXAM CHEST 2 VIEWS: CPT | Mod: 26,,, | Performed by: RADIOLOGY

## 2023-09-18 PROCEDURE — 99214 OFFICE O/P EST MOD 30 MIN: CPT | Mod: PBBFAC,25 | Performed by: INTERNAL MEDICINE

## 2023-09-18 PROCEDURE — 71046 X-RAY EXAM CHEST 2 VIEWS: CPT | Mod: TC

## 2023-09-18 PROCEDURE — 99214 OFFICE O/P EST MOD 30 MIN: CPT | Mod: S$PBB,,, | Performed by: INTERNAL MEDICINE

## 2023-09-18 PROCEDURE — 71046 XR CHEST PA AND LATERAL: ICD-10-PCS | Mod: 26,,, | Performed by: RADIOLOGY

## 2023-09-18 PROCEDURE — 99214 PR OFFICE/OUTPT VISIT, EST, LEVL IV, 30-39 MIN: ICD-10-PCS | Mod: S$PBB,,, | Performed by: INTERNAL MEDICINE

## 2023-09-18 RX ORDER — CLOPIDOGREL BISULFATE 75 MG/1
TABLET ORAL
COMMUNITY

## 2023-09-18 NOTE — PROGRESS NOTES
Subjective:       Patient ID: Garett Banegas is a 63 y.o. male.    Chief Complaint: Bronchiectasis (Better, not coughing up as much brown stuff, still hurts but not as bad.)    Patient with a lung abscess and shortness of breath      Past Medical History:   Diagnosis Date    Asthma     Compressed cervical disc 07/2023    COPD (chronic obstructive pulmonary disease)     Hyperlipidemia     Hypertension     Low back pain      Past Surgical History:   Procedure Laterality Date    APPENDECTOMY      BACK SURGERY      x3    HAND SURGERY      ROTATOR CUFF REPAIR Right     SINUS SURGERY       Family History   Problem Relation Age of Onset    Cancer Other     Diabetes Other     Heart disease Other     Hypertension Other     Stroke Other     Epilepsy Other      Review of patient's allergies indicates:   Allergen Reactions    Penicillins Rash     Other reaction(s): Hives, Unknown      Social History     Tobacco Use    Smoking status: Every Day    Smokeless tobacco: Never   Substance Use Topics    Alcohol use: Yes    Drug use: Never      Review of Systems   Constitutional:  Negative for chills, activity change and night sweats.   HENT:  Negative for congestion and ear pain.    Eyes:  Negative for redness and itching.   Cardiovascular:  Negative for chest pain and palpitations.   Musculoskeletal:  Negative for arthralgias and back pain.   Skin:  Negative for rash.   Gastrointestinal:  Negative for abdominal pain and abdominal distention.   Neurological:  Negative for dizziness and headaches.   Hematological:  Negative for adenopathy. Does not bruise/bleed easily.   Psychiatric/Behavioral:  Negative for confusion. The patient is not nervous/anxious.        Objective:      Physical Exam   Constitutional: He is oriented to person, place, and time. He appears well-developed and well-nourished.   HENT:   Head: Normocephalic.   Nose: Nose normal.   Mouth/Throat: Oropharynx is clear and moist.   Neck: No JVD present. No thyromegaly  "present.   Cardiovascular: Normal rate, regular rhythm, normal heart sounds and intact distal pulses.   Pulmonary/Chest: Normal expansion, hyperinflation, symmetric chest wall expansion, effort normal and breath sounds normal.   Abdominal: Soft. Bowel sounds are normal.   Musculoskeletal:         General: Normal range of motion.      Cervical back: Normal range of motion and neck supple.   Lymphadenopathy: No supraclavicular adenopathy is present.     He has no cervical adenopathy.   Neurological: He is alert and oriented to person, place, and time. He has normal reflexes.   Skin: Skin is warm and dry.   Psychiatric: He has a normal mood and affect. His behavior is normal.     Personal Diagnostic Review  none pertinent        9/18/2023     1:52 PM 9/1/2023     8:20 AM 9/1/2023     8:15 AM 9/1/2023     8:10 AM 9/1/2023     8:05 AM 9/1/2023     8:00 AM 9/1/2023     7:58 AM   Pulmonary Function Tests   SpO2 97 % 99 % 99 % 99 % 99 % 99 % 98 %   Height 5' 11" (1.803 m)         Weight 61.2 kg (135 lb)         BMI (Calculated) 18.8               Assessment:       1. SOB (shortness of breath)    2. Abscess of upper lobe of right lung with pneumonia        Outpatient Encounter Medications as of 9/18/2023   Medication Sig Dispense Refill    albuterol (PROVENTIL HFA) 90 mcg/actuation inhaler Inhale 2 puffs into the lungs every 6 (six) hours as needed for Wheezing. Rescue 18 g 11    aspirin (ECOTRIN) 81 MG EC tablet Take 81 mg by mouth once daily.      budesonide (PULMICORT FLEXHALER) 90 mcg/actuation AePB Inhale 2 puffs (180 mcg total) into the lungs 2 (two) times a day. Controller 1 each 2    carvediloL (COREG) 6.25 MG tablet Take 1 tablet by mouth once daily.      clopidogreL (PLAVIX) 75 mg tablet Take 75 mg by mouth once daily.      losartan (COZAAR) 50 MG tablet Take 50 mg by mouth once daily.      rosuvastatin (CRESTOR) 20 MG tablet Take 1 tablet (20 mg total) by mouth once daily. 90 tablet 3    clindamycin (CLEOCIN) 300 " MG capsule Take 1 capsule (300 mg total) by mouth every 6 (six) hours. (Patient not taking: Reported on 9/18/2023) 42 capsule 0    clopidogreL (PLAVIX) 75 mg tablet Take by mouth.       No facility-administered encounter medications on file as of 9/18/2023.     Orders Placed This Encounter   Procedures    X-Ray Chest PA And Lateral     Standing Status:   Future     Standing Expiration Date:   9/18/2024     Order Specific Question:   May the Radiologist modify the order per protocol to meet the clinical needs of the patient?     Answer:   Yes    Pulmonary function test     Standing Status:   Future     Standing Expiration Date:   9/18/2024     Order Specific Question:   Release to patient     Answer:   Immediate       Plan:       Problem List Items Addressed This Visit          Pulmonary    Abscess of upper lobe of right lung with pneumonia     Patient had a bronchoscopy done for right upper lobe abscess and there was no evidence of malignancy at that time no positive cultures with true was antibiotics the lesions have to size it was previously however there is another lesion noted were going to proceed with a CT chest an Asap to see what we have there and consider biopsy          Other Visit Diagnoses       SOB (shortness of breath)    -  Primary    Relevant Orders    Pulmonary function test    X-Ray Chest PA And Lateral

## 2023-09-18 NOTE — ASSESSMENT & PLAN NOTE
Patient is short of breath were going to get some pulmonary functions when he comes back to the clinic continues clarissa Buck

## 2023-09-26 DIAGNOSIS — M54.59 POSTURAL LOW BACK PAIN: Primary | ICD-10-CM

## 2023-09-26 NOTE — PROGRESS NOTES
See PLAN OF CARE     Sup Visit performed today with BRENNA Chun and BRENNA Esposito.  All goals and treatment plan reviewed. Will work toward completion of all goals set.     Plans for first treatment:      Back Exercises    Bike    Calf Stretch    Hamstring Stretch    Cybex Back    Cybex Abdominals    Cybex Leg Press -  Bilateral     Cybex Leg Press -  Single     Cybex Hip - Abduction    Cybex Hip - Flexion    Cybex Hip - Extension      Neuro-re-ed x     Cybex rows wide and close  Cable rotation  Cable palloff press  Lumbosacral rotation with multifidus activation/strengthening    Lumbar traction x   60# 60/20

## 2023-09-26 NOTE — ASSESSMENT & PLAN NOTE
Patient had a bronchoscopy done for right upper lobe abscess and there was no evidence of malignancy at that time no positive cultures with true was antibiotics the lesions have to size it was previously however there is another lesion noted were going to proceed with a CT chest an Asap to see what we have there and consider biopsy   Split-Thickness Skin Graft Text: The defect edges were debeveled with a #15 scalpel blade. Given the location of the defect, shape of the defect and the proximity to free margins a split thickness skin graft was deemed most appropriate. Using a sterile surgical marker, the primary defect shape was transferred to the donor site. The split thickness graft was then harvested.  The skin graft was then placed in the primary defect and oriented appropriately.

## 2023-10-03 ENCOUNTER — CLINICAL SUPPORT (OUTPATIENT)
Dept: REHABILITATION | Facility: HOSPITAL | Age: 63
End: 2023-10-03
Payer: OTHER MISCELLANEOUS

## 2023-10-03 DIAGNOSIS — M54.59 POSTURAL LOW BACK PAIN: Primary | ICD-10-CM

## 2023-10-03 PROCEDURE — 97162 PT EVAL MOD COMPLEX 30 MIN: CPT

## 2023-10-03 PROCEDURE — 97110 THERAPEUTIC EXERCISES: CPT

## 2023-10-03 NOTE — PLAN OF CARE
[facility]   Physical Therapy Initial Evaluation      Name: Garett Banegas  [facility] Number: [id number]    Therapy Diagnosis: Postural low back pain     Physician: Mara Levin, NP-C    Physician Orders: PT Eval and Treat Low back  Medical Diagnosis from Referral: Postural low back pain   Evaluation Date: 10/3/2023  Authorization Period Expiration: 9/25/2024  Plan of Care Expiration: 11/28/2023  Progress Note Due: As needed  Visit # / Visits authorized: 1 / 16   FOTO: 44 /100    Precautions: Standard     Time In: 10:45 am  Time Out: 11:30 am  Total Appointment Time (timed & untimed codes): 45 minutes    Subjective     Date of onset: 7/1/2023    History of current condition - Kalpesh reports: that 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 Patient has had increased pain since and has seen doctor at [facility] with x-ray performed initially and MRI done yesterday.      Falls: N/A    Imaging:   XR LUMBAR SPINE AP AND LATERAL     CLINICAL HISTORY:  .  Strain of muscle, fascia and tendon of lower back, initial encounter     TECHNIQUE:  [id number]     COMPARISON:  None     FINDINGS:  Mild multilevel degenerative change.     Posterior spinal fusion hardware L4-5 in expected position.     No acute findings     Impression:     As aboveXR THORACIC SPINE AP LATERAL     CLINICAL HISTORY:  Strain of muscle and tendon of unspecified wall of thorax, initial encounter     TECHNIQUE:  XR THORACIC SPINE AP LATERAL     COMPARISON:  Comparisons were reviewed, if available.     FINDINGS:  Moderate multilevel degenerative change.     Interval development of compression fracture of T11 and T12, correlate clinically.     Impression:     As above.    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  Current Level of Function: Patient is working in constant pain with more limitations.    Pain:  Current 8/10, worst 10/10, best 6/10   Location: bilateral back, buttocks and upper legs  Description: Tingling, Numb, Sharp, and Shooting  Aggravating Factors: Standing, Walking, Lifting, and Getting out of bed/chair  Easing Factors: pain medication    Patients goals: To decrease pain     Medical History:   Past Medical History:   Diagnosis Date    Asthma     Compressed cervical disc 07/2023    COPD (chronic obstructive pulmonary disease)     Hyperlipidemia     Hypertension     Low back pain        Surgical History:   Garett Banegas  has a past surgical history that includes Sinus surgery; Back surgery; Hand surgery; Appendectomy; and Rotator cuff repair (Right).    Medications:   Garett has a current medication list which includes the following prescription(s): albuterol, aspirin, budesonide, carvedilol, clindamycin, clopidogrel, clopidogrel, losartan, and rosuvastatin.    Allergies:   Review of patient's allergies indicates:   Allergen Reactions    Penicillins Rash     Other reaction(s): Hives, Unknown        Objective        Posture :     Standing Lordosis        []  Increased   [x]   Decreased   []  Within Normal Limits  Sitting Lordosis  []  Increased   [x]   Decreased   []  Within Normal Limits   Iliac Crest Height  []  Left/Right Increased      [x] Equal/Level  PSIS Height    []  Left/Right Increased      [x] Equal/Level  Pelvic Rot/Torsion       []   Yes     [x]   No  Scoliosis    []  Yes   Concave Right/Left      [x]  No  Lateral Shift    []   Right     []   Left          [x]  Within Normal Limits  Comments         Strength :      Myotome/Muscle :  Left   Right     L1-2    Psoas 4+/5 4+/5    L3       Quadriceps 4+/5 4+/5    L4       Anterior Tibialis 5/5 5/5    L5       EHL  5/5 5/5    S1      Gastocnemius  5/5 5/5    S2      Hamstrings 4+/5 4+/5                    Strength :   Abdominals = 4-/5  Back Extensors = 4-/5  Multifidus = 3+/5      Range of Motion :   Lumbosacral rotation = 10% of normal  Back :    Forward Flexion = 50%   Back Bending = 25%   Side Bending Left = 50%   Side Bending Right = 50%   Rotation Left = 50%   Rotation Right = 50%    Hip : Tight  Hamstrings : -30 degrees 90/90  Piriformis : Tight      Special Tests :     SLR :   Right   +/- <60 Degrees     [x]   yes   []  No  ;   +/-  60-90 Degrees     [x]   Yes    []  No   Left     +/- <60 Degrees      [x]  yes    [] No ;   +/-  60-90 Degrees       [x]   Yes    [] No    Sitting Slump Test :  Left : [x] Positive   []  Negative ;  Right : [x] Positive   []  Negative   Lower Extremity Length :   [] Right/Left Longer     [x]  Within Normal Limits   SALOMÓN : Left : [] Positive   [x]  Negative ;  Right : [] Positive   [x]  Negative       SI Joint :   Palpation   [] Positive   [x]  Negative   Compression   [] Positive   [x]  Negative   Distraction  [] Positive   [x]  Negative   Forward Bending [] Positive   [x]  Negative       Gait Assessment : Guarded trunk       Dermatome : Normal      Palpation : Tender paraspinals with spasms            Limitation/Restriction for FOTO Back Survey    Therapist reviewed FOTO scores for Garett Banegas on 10/3/2023.   FOTO documents entered into [employer] - see Media section.    Limitation Score: 56%         Treatment     Total Treatment time (time-based codes) separate from Evaluation: 10 minutes       Kalpesh received the treatments listed below:  THERAPEUTIC EXERCISES to develop strength, ROM, flexibility, posture, and core stabilization for 10 minutes including LTR, SKTC, DKTC, pelvic tilt, bridging, pelvic tilt with march, seated lumbar stretch, hamstring and piriformis stretch.      Patient Education and Home Exercises     Education provided:   - PLAN OF CARE   - HOME EXERCISE PROGRAM     Written Home Exercises Provided: yes.  Exercises were reviewed and Kalpesh was able to demonstrate them prior to the end of the session.  Kalpesh demonstrated good  understanding of the education provided. See EMR under Patient Instructions for exercises provided during therapy sessions.    Assessment     Garett is a 63 y.o. male referred to outpatient Physical Therapy with a medical diagnosis of Postural low back pain. Patient presents with Low back pain, abnormal posture, core weakness, decreased lumbar motion, decreased flexibility of hamstrings, hips and piriformis. Patient brought his MRI report from yesterday. MRI reveals the following: Disc bulging L2-3 with right neural canal narrowing, Disc bulging L3-4 with mild bilateral neural canal narrowing, Disc bulging or pseudo bulging and osteophytic ridging related to degenerative anterolisthesis of L4-5 with bilateral neural canal narrowing. There is postoperative changes of previous fusion at this level and Disc bulging and bilateral neural canal narrowing L5-S1. Patient has positive sitting slump test and STRAIGHT LEG RAISE test bilateral. Patient will benefit from skilled Physical Therapy intervention to address all deficits and help patient to return to their prior level of function.      Patient prognosis is Good.   Patient will benefit from skilled outpatient Physical Therapy to address the deficits stated above and in the chart below, provide patient /family education, and to maximize patientt's level of independence.     Plan of care discussed with patient: Yes  Patient's spiritual, cultural and educational needs considered and patient is agreeable to the plan of care and goals as stated below:     Anticipated Barriers for therapy: None    Medical Necessity is demonstrated by the following  History  Co-morbidities and personal factors that may impact the plan of care [] LOW: no personal factors / co-morbidities  [] MODERATE: 1-2 personal factors / co-morbidities  [x] HIGH: 3+ personal factors /  co-morbidities    Moderate / High Support Documentation:   Co-morbidities affecting plan of care:    Asthma    Compressed cervical disc    COPD (chronic obstructive pulmonary disease)    Hyperlipidemia    Hypertension    Low back pain   past surgical history that includes Sinus surgery; Back surgery; Hand surgery; Appendectomy; and Rotator cuff repair (Right).    Personal Factors:   no deficits     Examination  Body Structures and Functions, activity limitations and participation restrictions that may impact the plan of care [] LOW: addressing 1-2 elements  [] MODERATE: 3+ elements  [x] HIGH: 4+ elements (please support below)    Moderate / High Support Documentation: Low back pain, abnormal posture, core weakness, decreased lumbar motion, decreased flexibility of hamstrings, hips and piriformis     Clinical Presentation [] LOW: stable  [x] MODERATE: Evolving  [] HIGH: Unstable     Decision Making/ Complexity Score: moderate         Goals:  Short Term Goals: 4 weeks   1. Patient will be Independent with Home Exercise Program   2. Patient will demonstrate with improved Posture and Body Mechanics  3. Patient will increase Lumbosacral Range of Motion by 15%   4. Patient will increase Lower Extremity Strength to grossly 5/5  5. Patient will decrease complaints of pain with activity to Less than or Equal to 5/10     Long Term Goals: 8 weeks   1. Patient will tolerate 30 minutes of activity with complaints of Pain at Less Than or Equal to 3/10   2. Patient will increase Core Strength to grossly 4/5 or greater    Plan     Plan of care Certification: 10/3/2023 to 11/28/2023.    Outpatient Physical Therapy 2 times weekly for 8 weeks to include the following interventions: Lumbar Traction, Electrical Stimulation Pre-Mod, Manual Therapy, Moist Heat/ Ice, Neuromuscular Re-ed, Patient Education, Therapeutic Activities, and Therapeutic Exercise.     CHARISSA GARCIA, PT, MLT

## 2023-10-04 ENCOUNTER — HOSPITAL ENCOUNTER (OUTPATIENT)
Dept: RADIOLOGY | Facility: HOSPITAL | Age: 63
Discharge: HOME | End: 2023-10-04
Attending: INTERNAL MEDICINE
Payer: COMMERCIAL

## 2023-10-04 DIAGNOSIS — J85.3 ABSCESS OF MEDIASTINUM: ICD-10-CM

## 2023-10-04 DIAGNOSIS — J85.1 ABSCESS OF UPPER LOBE OF RIGHT LUNG WITH PNEUMONIA: ICD-10-CM

## 2023-10-04 PROCEDURE — 71250 CT THORAX DX C-: CPT | Mod: TC

## 2023-10-04 PROCEDURE — 71250 CT THORAX DX C-: CPT | Mod: 26,,, | Performed by: STUDENT IN AN ORGANIZED HEALTH CARE EDUCATION/TRAINING PROGRAM

## 2023-10-04 PROCEDURE — 71250 CT CHEST WITHOUT CONTRAST: ICD-10-PCS | Mod: 26,,, | Performed by: STUDENT IN AN ORGANIZED HEALTH CARE EDUCATION/TRAINING PROGRAM

## 2023-10-05 ENCOUNTER — TELEPHONE (OUTPATIENT)
Dept: PULMONOLOGY | Facility: CLINIC | Age: 63
End: 2023-10-05
Payer: COMMERCIAL

## 2023-10-05 DIAGNOSIS — R91.8 LUNG MASS: Primary | ICD-10-CM

## 2023-10-05 NOTE — TELEPHONE ENCOUNTER
Contacted pt about Ct Bx  will like for him to have; we got it scheduled for Oct 12th @ 8am, NPO & No blood thinners.    Pt did not answer, will call back.

## 2023-10-05 NOTE — TELEPHONE ENCOUNTER
----- Message from Marlin Brown sent at 10/5/2023  1:19 PM CDT -----  Patient need you to call him about the CT BIOPSY and where to take it.

## 2023-10-09 ENCOUNTER — CLINICAL SUPPORT (OUTPATIENT)
Dept: REHABILITATION | Facility: HOSPITAL | Age: 63
End: 2023-10-09
Payer: OTHER MISCELLANEOUS

## 2023-10-09 DIAGNOSIS — S22.000A COMPRESSION FRACTURE OF BODY OF THORACIC VERTEBRA: Primary | ICD-10-CM

## 2023-10-09 PROCEDURE — 97110 THERAPEUTIC EXERCISES: CPT

## 2023-10-09 PROCEDURE — 97012 MECHANICAL TRACTION THERAPY: CPT

## 2023-10-09 NOTE — PROGRESS NOTES
[facility]   Physical Therapy Treatment Note     Name: Garett Banegas  [facility] Number: [id number]    Therapy Diagnosis: Postural low back pain     Physician: Mara Levin, NP-C    Visit Date: 10/9/2023     Physician Orders: PT Eval and Treat Low back  Medical Diagnosis from Referral: Postural low back pain   Evaluation Date: 10/3/2023  Authorization Period Expiration: 9/25/2024  Plan of Care Expiration: 11/28/2023  Progress Note Due: As needed  Visit # / Visits authorized: 1 / 16   FOTO: 44 /100    PTA Visit #: 0    Time In: 4:05 pm  Time Out: 5:00 pm   Total Billable Time: 55 minutes    Precautions: multiple bulging discs   Functional Level at time of Evaluation:  Patient is working in constant pain with more limitations.     Subjective     Pt reports: He worked 5 days in a row. He got off work this morning so his back is really painful.  He was compliant with home exercise program.    Pain: 7/10  Location: Low Back with Radiculopathy to Bilateral Knees      Objective       Kalpesh received therapeutic exercises to develop strength, endurance, ROM, flexibility, posture, and core stabilization for 40 minutes including:    Back Exercises : multiple rest breaks needed due to significant shortness of breath     Bike  5 Min    Calf Stretch  4 x 15 sec    Hamstring Stretch  4 x 15 sec    Cybex Back  2 x 10 with 3 plates    Cybex Abdominals     Cybex Leg Press -  Bilateral   2 x 10 with 6 plates    Cybex Leg Press -  Single      Cybex Hamstring Curls   2 x 10 with 4 plates    Cybex Hip - Abduction     Cybex Hip - Flexion     Cybex Hip - Extension                 Kalpesh received the following manual therapy techniques: Joint mobilizations, Manual traction, Myofacial release, and Soft tissue Mobilization were applied to the: Low Back for 0 minutes, including:      Kalpesh participated in neuromuscular re-education activities to improve: Coordination, Kinesthetic, Proprioception, and Posture  for 0 minutes. The following activities were included:     Cybex rows wide and close  Cable rotation  Cable palloff press  Lumbosacral rotation with multifidus activation/strengthening    Kalpesh received the following supervised modalities after being cleared for contradictions: Mechanical Traction:  Garett received intermittent mechanical traction to the lumbar spine at a force of 75 pounds for a total of 15 minutes. Hold time of 60 seconds and rest time for 20 second. Patient tolerated treatment well without any adverse effects.            Home Exercises Provided and Patient Education Provided     Education provided: We reviewed the Home Exercise Program that he was given at time of Evaluation.     Written Home Exercises Provided: Patient instructed to cont prior HEP.  Exercises were reviewed and Kalpesh was able to demonstrate them prior to the end of the session.  Kalpesh demonstrated good  understanding of the education provided.     See EMR under Patient Instructions for exercises provided prior visit.    Assessment     Today is the patient's first treatment since the Evaluation. We discussed his Home Exercise Program. He feels like he understands this well and has no questions about it. Therapist initiated the Plan of Care today. We started on the Cybex machines today. He tolerates the machines well but does have significant back pain. He states his back hurts though whether he is sitting, standing, or exercising. He also reports that they found a spot on his right lung that they think may be cancer. They are doing a biopsy on Thursday, 10/12/2023. He is very short of breath and has to take multiple rest breaks throughout the session today.  Exercises took an extended amount of time due to this shortness of breath. Ended session with mechanical lumbar traction. He tolerated this well with no increased complaints of pain. We will progress him as able.         PMH from Evaluation :   Garett is a 63 y.o. male  referred to outpatient Physical Therapy with a medical diagnosis of Postural low back pain. Patient presents with Low back pain, abnormal posture, core weakness, decreased lumbar motion, decreased flexibility of hamstrings, hips and piriformis. Patient brought his MRI report from yesterday. MRI reveals the following: Disc bulging L2-3 with right neural canal narrowing, Disc bulging L3-4 with mild bilateral neural canal narrowing, Disc bulging or pseudo bulging and osteophytic ridging related to degenerative anterolisthesis of L4-5 with bilateral neural canal narrowing. There is postoperative changes of previous fusion at this level and Disc bulging and bilateral neural canal narrowing L5-S1. Patient has positive sitting slump test and STRAIGHT LEG RAISE test bilateral. Patient will benefit from skilled Physical Therapy intervention to address all deficits and help patient to return to their prior level of function.         Kalpesh Is progressing well towards his goals.   Pt prognosis is Good.     Pt will continue to benefit from skilled outpatient physical therapy to address the deficits listed in the problem list box on initial evaluation, provide pt/family education and to maximize pt's level of independence in the home and community environment.     Pt's spiritual, cultural and educational needs considered and pt agreeable to plan of care and goals.     Anticipated barriers to physical therapy: None    Goals:  Short Term Goals: 4 weeks   1. Patient will be Independent with Home Exercise Program   2. Patient will demonstrate with improved Posture and Body Mechanics  3. Patient will increase Lumbosacral Range of Motion by 15%   4. Patient will increase Lower Extremity Strength to grossly 5/5  5. Patient will decrease complaints of pain with activity to Less than or Equal to 5/10      Long Term Goals: 8 weeks   1. Patient will tolerate 30 minutes of activity with complaints of Pain at Less Than or Equal to 3/10   2.  Patient will increase Core Strength to grossly 4/5 or greater      Plan     Plan of care Certification: 10/3/2023 to 11/28/2023.     Outpatient Physical Therapy 2 times weekly for 8 weeks to include the following interventions: Lumbar Traction, Electrical Stimulation Pre-Mod, Manual Therapy, Moist Heat/ Ice, Neuromuscular Re-ed, Patient Education, Therapeutic Activities, and Therapeutic Exercise.     NAHOMY GARCIA, PT, DPT   10/9/2023

## 2023-10-11 DIAGNOSIS — R91.8 LUNG MASS: Primary | ICD-10-CM

## 2023-10-12 ENCOUNTER — HOSPITAL ENCOUNTER (OUTPATIENT)
Dept: RADIOLOGY | Facility: HOSPITAL | Age: 63
Discharge: HOME | DRG: 201 | End: 2023-10-12
Attending: INTERNAL MEDICINE
Payer: COMMERCIAL

## 2023-10-12 ENCOUNTER — HOSPITAL ENCOUNTER (INPATIENT)
Facility: HOSPITAL | Age: 63
LOS: 2 days | Discharge: HOME | DRG: 201 | End: 2023-10-14
Attending: FAMILY MEDICINE | Admitting: FAMILY MEDICINE
Payer: COMMERCIAL

## 2023-10-12 VITALS
DIASTOLIC BLOOD PRESSURE: 67 MMHG | SYSTOLIC BLOOD PRESSURE: 133 MMHG | RESPIRATION RATE: 15 BRPM | HEART RATE: 72 BPM | OXYGEN SATURATION: 100 %

## 2023-10-12 DIAGNOSIS — R91.8 LUNG MASS: ICD-10-CM

## 2023-10-12 DIAGNOSIS — J95.811 PNEUMOTHORAX AFTER BIOPSY: Primary | ICD-10-CM

## 2023-10-12 DIAGNOSIS — R07.9 CHEST PAIN: ICD-10-CM

## 2023-10-12 PROBLEM — J44.9 COPD, SEVERITY TO BE DETERMINED: Status: ACTIVE | Noted: 2023-10-12

## 2023-10-12 PROBLEM — I25.10 CORONARY ARTERY DISEASE INVOLVING NATIVE CORONARY ARTERY OF NATIVE HEART WITHOUT ANGINA PECTORIS: Status: ACTIVE | Noted: 2023-10-12

## 2023-10-12 PROBLEM — F17.210 TOBACCO DEPENDENCE DUE TO CIGARETTES: Status: ACTIVE | Noted: 2023-10-12

## 2023-10-12 PROBLEM — E78.2 MIXED HYPERLIPIDEMIA: Status: ACTIVE | Noted: 2023-10-12

## 2023-10-12 LAB
APTT PPP: 31.7 SECONDS (ref 25.2–37.3)
BASOPHILS # BLD AUTO: 0.05 K/UL (ref 0–0.2)
BASOPHILS NFR BLD AUTO: 0.5 % (ref 0–1)
DIFFERENTIAL METHOD BLD: ABNORMAL
EOSINOPHIL # BLD AUTO: 0.38 K/UL (ref 0–0.5)
EOSINOPHIL NFR BLD AUTO: 3.6 % (ref 1–4)
ERYTHROCYTE [DISTWIDTH] IN BLOOD BY AUTOMATED COUNT: 13.1 % (ref 11.5–14.5)
HCT VFR BLD AUTO: 36.9 % (ref 40–54)
HGB BLD-MCNC: 12.6 G/DL (ref 13.5–18)
IMM GRANULOCYTES # BLD AUTO: 0.04 K/UL (ref 0–0.04)
IMM GRANULOCYTES NFR BLD: 0.4 % (ref 0–0.4)
INR BLD: 1.02
LYMPHOCYTES # BLD AUTO: 1.62 K/UL (ref 1–4.8)
LYMPHOCYTES NFR BLD AUTO: 15.5 % (ref 27–41)
MCH RBC QN AUTO: 31.7 PG (ref 27–31)
MCHC RBC AUTO-ENTMCNC: 34.1 G/DL (ref 32–36)
MCV RBC AUTO: 92.7 FL (ref 80–96)
MONOCYTES # BLD AUTO: 0.66 K/UL (ref 0–0.8)
MONOCYTES NFR BLD AUTO: 6.3 % (ref 2–6)
MPC BLD CALC-MCNC: 10.5 FL (ref 9.4–12.4)
NEUTROPHILS # BLD AUTO: 7.72 K/UL (ref 1.8–7.7)
NEUTROPHILS NFR BLD AUTO: 73.7 % (ref 53–65)
NRBC # BLD AUTO: 0 X10E3/UL
NRBC, AUTO (.00): 0 %
PLATELET # BLD AUTO: 273 K/UL (ref 150–400)
PROTHROMBIN TIME: 13.3 SECONDS (ref 11.7–14.7)
RBC # BLD AUTO: 3.98 M/UL (ref 4.6–6.2)
WBC # BLD AUTO: 10.47 K/UL (ref 4.5–11)

## 2023-10-12 PROCEDURE — 32557 INSERT CATH PLEURA W/ IMAGE: CPT | Mod: RT,,, | Performed by: STUDENT IN AN ORGANIZED HEALTH CARE EDUCATION/TRAINING PROGRAM

## 2023-10-12 PROCEDURE — 32557 CT CHEST TUBE PLACEMENT: ICD-10-PCS | Mod: RT,,, | Performed by: STUDENT IN AN ORGANIZED HEALTH CARE EDUCATION/TRAINING PROGRAM

## 2023-10-12 PROCEDURE — 99223 PR INITIAL HOSPITAL CARE,LEVL III: ICD-10-PCS | Mod: ,,, | Performed by: STUDENT IN AN ORGANIZED HEALTH CARE EDUCATION/TRAINING PROGRAM

## 2023-10-12 PROCEDURE — 85730 THROMBOPLASTIN TIME PARTIAL: CPT | Performed by: STUDENT IN AN ORGANIZED HEALTH CARE EDUCATION/TRAINING PROGRAM

## 2023-10-12 PROCEDURE — 71046 X-RAY EXAM CHEST 2 VIEWS: CPT | Mod: TC

## 2023-10-12 PROCEDURE — 88305 TISSUE EXAM BY PATHOLOGIST: CPT | Mod: TC,SUR | Performed by: STUDENT IN AN ORGANIZED HEALTH CARE EDUCATION/TRAINING PROGRAM

## 2023-10-12 PROCEDURE — 94761 N-INVAS EAR/PLS OXIMETRY MLT: CPT

## 2023-10-12 PROCEDURE — 25000242 PHARM REV CODE 250 ALT 637 W/ HCPCS

## 2023-10-12 PROCEDURE — 99223 PR INITIAL HOSPITAL CARE,LEVL III: ICD-10-PCS | Mod: ,,, | Performed by: INTERNAL MEDICINE

## 2023-10-12 PROCEDURE — C1729 CATH, DRAINAGE: HCPCS

## 2023-10-12 PROCEDURE — 71046 X-RAY EXAM CHEST 2 VIEWS: CPT | Mod: 26,76,, | Performed by: STUDENT IN AN ORGANIZED HEALTH CARE EDUCATION/TRAINING PROGRAM

## 2023-10-12 PROCEDURE — 85025 COMPLETE CBC W/AUTO DIFF WBC: CPT | Performed by: STUDENT IN AN ORGANIZED HEALTH CARE EDUCATION/TRAINING PROGRAM

## 2023-10-12 PROCEDURE — 71046 XR CHEST 2 VIEW INSPIRATION EXPIRATION: ICD-10-PCS | Mod: 26,,, | Performed by: STUDENT IN AN ORGANIZED HEALTH CARE EDUCATION/TRAINING PROGRAM

## 2023-10-12 PROCEDURE — 71046 XR CHEST 2 VIEW INSPIRATION EXPIRATION: ICD-10-PCS | Mod: 26,76,, | Performed by: STUDENT IN AN ORGANIZED HEALTH CARE EDUCATION/TRAINING PROGRAM

## 2023-10-12 PROCEDURE — 99900035 HC TECH TIME PER 15 MIN (STAT)

## 2023-10-12 PROCEDURE — 99152 MOD SED SAME PHYS/QHP 5/>YRS: CPT

## 2023-10-12 PROCEDURE — 71046 X-RAY EXAM CHEST 2 VIEWS: CPT | Mod: 26,,, | Performed by: STUDENT IN AN ORGANIZED HEALTH CARE EDUCATION/TRAINING PROGRAM

## 2023-10-12 PROCEDURE — 32408 CORE NDL BX LNG/MED PERQ: CPT

## 2023-10-12 PROCEDURE — 27000221 HC OXYGEN, UP TO 24 HOURS

## 2023-10-12 PROCEDURE — 99223 1ST HOSP IP/OBS HIGH 75: CPT | Mod: ,,, | Performed by: STUDENT IN AN ORGANIZED HEALTH CARE EDUCATION/TRAINING PROGRAM

## 2023-10-12 PROCEDURE — 94640 AIRWAY INHALATION TREATMENT: CPT

## 2023-10-12 PROCEDURE — 11000001 HC ACUTE MED/SURG PRIVATE ROOM

## 2023-10-12 PROCEDURE — 25000003 PHARM REV CODE 250: Performed by: REGISTERED NURSE

## 2023-10-12 PROCEDURE — 88305 SURGICAL PATHOLOGY: ICD-10-PCS | Mod: 26,,, | Performed by: PATHOLOGY

## 2023-10-12 PROCEDURE — 63600175 PHARM REV CODE 636 W HCPCS: Performed by: STUDENT IN AN ORGANIZED HEALTH CARE EDUCATION/TRAINING PROGRAM

## 2023-10-12 PROCEDURE — 32408 CT BIOPSY LUNG W/ GUIDANCE: ICD-10-PCS | Mod: ,,, | Performed by: STUDENT IN AN ORGANIZED HEALTH CARE EDUCATION/TRAINING PROGRAM

## 2023-10-12 PROCEDURE — 99153 MOD SED SAME PHYS/QHP EA: CPT

## 2023-10-12 PROCEDURE — 99223 1ST HOSP IP/OBS HIGH 75: CPT | Mod: ,,, | Performed by: INTERNAL MEDICINE

## 2023-10-12 PROCEDURE — 32408 CORE NDL BX LNG/MED PERQ: CPT | Mod: ,,, | Performed by: STUDENT IN AN ORGANIZED HEALTH CARE EDUCATION/TRAINING PROGRAM

## 2023-10-12 PROCEDURE — 88305 TISSUE EXAM BY PATHOLOGIST: CPT | Mod: 26,,, | Performed by: PATHOLOGY

## 2023-10-12 RX ORDER — HYDROCODONE BITARTRATE AND ACETAMINOPHEN 7.5; 325 MG/1; MG/1
1 TABLET ORAL EVERY 6 HOURS PRN
Status: DISCONTINUED | OUTPATIENT
Start: 2023-10-12 | End: 2023-10-14 | Stop reason: HOSPADM

## 2023-10-12 RX ORDER — GLUCAGON 1 MG
1 KIT INJECTION
Status: DISCONTINUED | OUTPATIENT
Start: 2023-10-12 | End: 2023-10-14 | Stop reason: HOSPADM

## 2023-10-12 RX ORDER — IBUPROFEN 200 MG
16 TABLET ORAL
Status: DISCONTINUED | OUTPATIENT
Start: 2023-10-12 | End: 2023-10-14 | Stop reason: HOSPADM

## 2023-10-12 RX ORDER — ACETAMINOPHEN 325 MG/1
650 TABLET ORAL EVERY 4 HOURS PRN
Status: DISCONTINUED | OUTPATIENT
Start: 2023-10-12 | End: 2023-10-14 | Stop reason: HOSPADM

## 2023-10-12 RX ORDER — ONDANSETRON 2 MG/ML
4 INJECTION INTRAMUSCULAR; INTRAVENOUS EVERY 8 HOURS PRN
Status: DISCONTINUED | OUTPATIENT
Start: 2023-10-12 | End: 2023-10-14 | Stop reason: HOSPADM

## 2023-10-12 RX ORDER — LOSARTAN POTASSIUM 50 MG/1
50 TABLET ORAL DAILY
Status: DISCONTINUED | OUTPATIENT
Start: 2023-10-13 | End: 2023-10-14 | Stop reason: HOSPADM

## 2023-10-12 RX ORDER — FENTANYL CITRATE 50 UG/ML
INJECTION, SOLUTION INTRAMUSCULAR; INTRAVENOUS
Status: COMPLETED | OUTPATIENT
Start: 2023-10-12 | End: 2023-10-12

## 2023-10-12 RX ORDER — IBUPROFEN 200 MG
24 TABLET ORAL
Status: DISCONTINUED | OUTPATIENT
Start: 2023-10-12 | End: 2023-10-14 | Stop reason: HOSPADM

## 2023-10-12 RX ORDER — POLYETHYLENE GLYCOL 3350 17 G/17G
17 POWDER, FOR SOLUTION ORAL 2 TIMES DAILY PRN
Status: DISCONTINUED | OUTPATIENT
Start: 2023-10-12 | End: 2023-10-14 | Stop reason: HOSPADM

## 2023-10-12 RX ORDER — ATORVASTATIN CALCIUM 40 MG/1
40 TABLET, FILM COATED ORAL DAILY
Status: DISCONTINUED | OUTPATIENT
Start: 2023-10-13 | End: 2023-10-14 | Stop reason: HOSPADM

## 2023-10-12 RX ORDER — CARVEDILOL 6.25 MG/1
6.25 TABLET ORAL DAILY
Status: DISCONTINUED | OUTPATIENT
Start: 2023-10-13 | End: 2023-10-14 | Stop reason: HOSPADM

## 2023-10-12 RX ORDER — IPRATROPIUM BROMIDE AND ALBUTEROL SULFATE 2.5; .5 MG/3ML; MG/3ML
3 SOLUTION RESPIRATORY (INHALATION)
Status: DISCONTINUED | OUTPATIENT
Start: 2023-10-12 | End: 2023-10-14 | Stop reason: HOSPADM

## 2023-10-12 RX ORDER — NALOXONE HCL 0.4 MG/ML
0.02 VIAL (ML) INJECTION
Status: DISCONTINUED | OUTPATIENT
Start: 2023-10-12 | End: 2023-10-14 | Stop reason: HOSPADM

## 2023-10-12 RX ORDER — TALC
6 POWDER (GRAM) TOPICAL NIGHTLY PRN
Status: DISCONTINUED | OUTPATIENT
Start: 2023-10-12 | End: 2023-10-14 | Stop reason: HOSPADM

## 2023-10-12 RX ORDER — MIDAZOLAM HYDROCHLORIDE 1 MG/ML
INJECTION INTRAMUSCULAR; INTRAVENOUS
Status: COMPLETED | OUTPATIENT
Start: 2023-10-12 | End: 2023-10-12

## 2023-10-12 RX ORDER — BUDESONIDE 0.25 MG/2ML
0.25 INHALANT ORAL DAILY
Status: DISCONTINUED | OUTPATIENT
Start: 2023-10-13 | End: 2023-10-14 | Stop reason: HOSPADM

## 2023-10-12 RX ORDER — CLOPIDOGREL BISULFATE 75 MG/1
75 TABLET ORAL DAILY
Status: DISCONTINUED | OUTPATIENT
Start: 2023-10-13 | End: 2023-10-14 | Stop reason: HOSPADM

## 2023-10-12 RX ORDER — SODIUM CHLORIDE 0.9 % (FLUSH) 0.9 %
10 SYRINGE (ML) INJECTION EVERY 12 HOURS PRN
Status: DISCONTINUED | OUTPATIENT
Start: 2023-10-12 | End: 2023-10-14 | Stop reason: HOSPADM

## 2023-10-12 RX ORDER — ASPIRIN 81 MG/1
81 TABLET ORAL DAILY
Status: DISCONTINUED | OUTPATIENT
Start: 2023-10-13 | End: 2023-10-14 | Stop reason: HOSPADM

## 2023-10-12 RX ADMIN — MIDAZOLAM HYDROCHLORIDE 2 MG: 1 INJECTION, SOLUTION INTRAMUSCULAR; INTRAVENOUS at 09:10

## 2023-10-12 RX ADMIN — HYDROCODONE BITARTRATE AND ACETAMINOPHEN 1 TABLET: 7.5; 325 TABLET ORAL at 09:10

## 2023-10-12 RX ADMIN — IPRATROPIUM BROMIDE AND ALBUTEROL SULFATE 3 ML: .5; 3 SOLUTION RESPIRATORY (INHALATION) at 07:10

## 2023-10-12 RX ADMIN — MIDAZOLAM HYDROCHLORIDE 1 MG: 1 INJECTION, SOLUTION INTRAMUSCULAR; INTRAVENOUS at 10:10

## 2023-10-12 RX ADMIN — FENTANYL CITRATE 50 MCG: 50 INJECTION INTRAMUSCULAR; INTRAVENOUS at 09:10

## 2023-10-12 NOTE — ASSESSMENT & PLAN NOTE
Patient followed by Dr. Lang  Patient still smoked 1 pack of cigarettes per day  On admission SpO2 100% on room air  Duonebs and budesonide ordered  2L [place] oxygen to aid in healing of pneumothorax

## 2023-10-12 NOTE — PROCEDURES
CT guided right lung biopsy performed by Dr. Jung with assistance from Connor MULTANI. Informed consent obtained including risks and possible complications. Patient prepped with chlor prep and draped in a sterile fashion. 7 cc of 1% lidocaine infused. Utilizing CT guidance a 20 gauge temno needle was advanced into the thorax with a anterior approach.   2 - 20 gauge core specimens were obtained and sent to the lab for analysis.Total sedation time is 1006 till 1036. Specimen delivered to the lab by Mara Steele RN. Needle withdrawn and pressure held on puncture site. Patient tolerated procedure well with a small amount of free air seen on the postprocedure CT..

## 2023-10-12 NOTE — ASSESSMENT & PLAN NOTE
CT guided biopsy of right lung 10/12/2023 - success tissue taken to pathology  CXR afterward showed a small right apicolateral pneumothorax measuring 0.3 cm.  A subsequent CXR showed interval enlargement of a right apical pneumothorax now measuring 0.6 cm  CT Chest tube placement - Successfully placed right-sided chest tube with interval reduction of the right-sided pneumothorax.   Surgery consulted to help with management, assistance appreciated

## 2023-10-12 NOTE — HPI
Patient is a 63 year old male that presents to [facility] for a right sided pneumothorax. He has a PMHx of CAD, COPD, HTN and tobacco dependence. The patient is followed by Dr. Lang for a lung mass that did not fully respond to antibiotics. Today he was received a lung CT guided lung biopsy on the right side performed by IR at this hospital when free air was seen on imaging. The biopsy itself was successful. 1st CXR afterward showed a small right apicolateral pneumothorax measuring 0.3 cm. A subsequent CXR showed interval enlargement of a right apical pneumothorax now measuring 0.6 cm. Following this a chest tube was successfully placed. General Surgery will be consulted to manage the chest tube.    The patient has slight baseline SOB due to still smoking a reported 1 pack of cigarettes per day. The patient does not use oxygen at home and rarely uses his albuterol rescue inhaler. He states his home prescribed budesonide is not effective so he does not use it. The patient will be admitted to [facility] under the direct supervision of Dr. Jamey AYALA for continued care and medical management.

## 2023-10-12 NOTE — ASSESSMENT & PLAN NOTE
10/12/23  CXR showed  right apical pneumothorax now measuring 0.6 cm.   pigtail catheter to suction  Repeat cxr in the morning  If pneumo improved will place chest drainage system to water seal

## 2023-10-12 NOTE — PROCEDURES
CT-guided right chest tube placement performed by Dr. Jung with assistance from Connor MULTANI.  Patient was prepped with ChloraPrep and draped in a sterile fashion.  Formal timeout was called with all present in agreement. 6 cc of 1% lidocaine infused.  Utilizing CT guidance a 8.5 Irish chest tube was placed anteriorly in the right chest.  Placement verified with CT.  Catheter attached to the skin with a Stay Fix device.  Catheter attached to a water seal suction device. Patient tolerated procedure well with no immediate complication.

## 2023-10-12 NOTE — NURSING
Patient arrived to floor at 1420 via bed. Ivonne davey has a chest tube and oxygen @ 2liters. Pt Is stable.

## 2023-10-12 NOTE — H&P
[facility] - [address] [place] [address] Medicine  History & Physical    Patient Name: Garett Banegas  MRN: [id number]  Patient Class: IP- Inpatient  Admission Date: 10/12/2023  Attending Physician: Dmitri Concepcion MD   Primary Care Provider: Giancarlo Azar MD         Patient information was obtained from patient, spouse/SO and past medical records.     Subjective:     Principal Problem:Pneumothorax after biopsy    Chief Complaint:   Chief Complaint   Patient presents with    Shortness of Breath        HPI: Patient is a 63 year old male that presents to [facility] for a right sided pneumothorax. He has a PMHx of CAD, COPD, HTN and tobacco dependence. The patient is followed by Dr. Lang for a lung mass that did not fully respond to antibiotics. Today he was received a lung CT guided lung biopsy on the right side performed by IR at this hospital when free air was seen on imaging. The biopsy itself was successful. 1st CXR afterward showed a small right apicolateral pneumothorax measuring 0.3 cm. A subsequent CXR showed interval enlargement of a right apical pneumothorax now measuring 0.6 cm. Following this a chest tube was successfully placed. General Surgery will be consulted to manage the chest tube.    The patient has slight baseline SOB due to still smoking a reported 1 pack of cigarettes per day. The patient does not use oxygen at home and rarely uses his albuterol rescue inhaler. He states his home prescribed budesonide is not effective so he does not use it. The patient will be admitted to [facility] under the direct supervision of Dr. Jamey AYALA for continued care and medical management.       Past Medical History:   Diagnosis Date    Asthma     CAD (coronary artery disease)     Compressed cervical disc 07/2023    COPD (chronic obstructive pulmonary disease)     Hyperlipidemia     Hypertension     Low back pain     Tobacco dependence due to cigarettes         Past Surgical History:   Procedure Laterality Date    APPENDECTOMY      BACK SURGERY      x3    HAND SURGERY      ROTATOR CUFF REPAIR Right     SINUS SURGERY         Review of patient's allergies indicates:   Allergen Reactions    Penicillins Rash     Other reaction(s): Hives, Unknown       Current Facility-Administered Medications on File Prior to Encounter   Medication    [COMPLETED] fentaNYL 50 mcg/mL injection    [COMPLETED] midazolam (VERSED) 1 mg/mL injection     Current Outpatient Medications on File Prior to Encounter   Medication Sig    albuterol (PROVENTIL HFA) 90 mcg/actuation inhaler Inhale 2 puffs into the lungs every 6 (six) hours as needed for Wheezing. Rescue    aspirin (ECOTRIN) 81 MG EC tablet Take 81 mg by mouth once daily.    budesonide (PULMICORT FLEXHALER) 90 mcg/actuation AePB Inhale 2 puffs (180 mcg total) into the lungs 2 (two) times a day. Controller    carvediloL (COREG) 6.25 MG tablet Take 1 tablet by mouth once daily.    clopidogreL (PLAVIX) 75 mg tablet Take by mouth.    losartan (COZAAR) 50 MG tablet Take 50 mg by mouth once daily.    rosuvastatin (CRESTOR) 20 MG tablet Take 1 tablet (20 mg total) by mouth once daily.    [DISCONTINUED] clindamycin (CLEOCIN) 300 MG capsule Take 1 capsule (300 mg total) by mouth every 6 (six) hours. (Patient not taking: Reported on 9/18/2023)    [DISCONTINUED] clopidogreL (PLAVIX) 75 mg tablet Take 75 mg by mouth once daily.     Family History       Problem Relation (Age of Onset)    Cancer Other    Diabetes Other    Epilepsy Other    Heart disease Other    Hypertension Other    Stroke Other          Tobacco Use    Smoking status: Every Day    Smokeless tobacco: Never   Substance and Sexual Activity    Alcohol use: Yes    Drug use: Never    Sexual activity: Yes     Review of Systems   Constitutional:  Negative for chills and fatigue.   HENT:  Negative for drooling, ear pain and mouth sores.    Respiratory:  Positive for cough  and shortness of breath. Negative for wheezing and stridor.    Cardiovascular:  Negative for chest pain, palpitations and leg swelling.   Gastrointestinal:  Positive for constipation. Negative for abdominal distention, abdominal pain and diarrhea.   Genitourinary:  Negative for difficulty urinating and frequency.   Allergic/Immunologic: Negative for environmental allergies and food allergies.   Neurological:  Negative for facial asymmetry, light-headedness, numbness and headaches.   Psychiatric/Behavioral:  Negative for behavioral problems, confusion, dysphoric mood and hallucinations.    All other systems reviewed and are negative.    Objective:     Vital Signs (Most Recent):  Temp: 98.2 °F (36.8 °C) (10/12/23 1420)  Pulse: 61 (10/12/23 1420)  Resp: 15 (10/12/23 1420)  BP: (!) 141/77 (10/12/23 1420)  SpO2: 99 % (10/12/23 1420) Vital Signs (24h Range):  Temp:  [98.2 °F (36.8 °C)] 98.2 °F (36.8 °C)  Pulse:  [59-73] 61  Resp:  [11-20] 15  SpO2:  [97 %-100 %] 99 %  BP: (103-141)/(64-83) 141/77     Weight: 59 kg (130 lb)  Body mass index is 18.13 kg/m².     Physical Exam  Vitals reviewed.   Constitutional:       General: He is awake. He is not in acute distress.     Appearance: Normal appearance. He is well-developed, well-groomed and underweight. He is not ill-appearing.   HENT:      Head: Normocephalic and atraumatic.      Mouth/Throat:      Mouth: Mucous membranes are moist.      Pharynx: Oropharynx is clear.   Eyes:      Conjunctiva/sclera: Conjunctivae normal.   Cardiovascular:      Rate and Rhythm: Normal rate and regular rhythm.      Pulses: Normal pulses.      Heart sounds: Normal heart sounds.   Pulmonary:      Effort: Pulmonary effort is normal.      Breath sounds: Examination of the right-upper field reveals decreased breath sounds. Examination of the right-middle field reveals decreased breath sounds. Examination of the right-lower field reveals decreased breath sounds. Decreased breath sounds present.    Chest:       Abdominal:      General: Bowel sounds are normal.   Musculoskeletal:      Right lower leg: No edema.      Left lower leg: No edema.   Skin:     General: Skin is warm and dry.   Neurological:      Mental Status: He is alert and oriented to person, place, and time.   Psychiatric:         Mood and Affect: Mood normal.         Behavior: Behavior normal. Behavior is cooperative.                Significant Labs: All pertinent labs within the past 24 hours have been reviewed.    Significant Imaging: I have reviewed all pertinent imaging results/findings within the past 24 hours.    Assessment/Plan:     * Pneumothorax after biopsy  CT guided biopsy of right lung 10/12/2023 - success tissue taken to pathology  CXR afterward showed a small right apicolateral pneumothorax measuring 0.3 cm.  A subsequent CXR showed interval enlargement of a right apical pneumothorax now measuring 0.6 cm  CT Chest tube placement - Successfully placed right-sided chest tube with interval reduction of the right-sided pneumothorax.   Surgery consulted to help with management, assistance appreciated     COPD, severity to be determined  Patient followed by Dr. Lang  Patient still smoked 1 pack of cigarettes per day  On admission SpO2 100% on room air  Duonebs and budesonide ordered  2L NC oxygen to aid in healing of pneumothorax       Coronary artery disease involving native coronary artery of native heart without angina pectoris  Patient reports coronary stent placed in 2019  Continue home Aspirin and Plavix       Hypertension  Continue home Losartan      Tobacco dependence due to cigarettes  Dangers of cigarette smoking were reviewed with patient in detail. Patient was Counseled for 3-10 minutes. Nicotine replacement options were discussed. Nicotine replacement was discussed- not prescribed per patient's request    Mixed hyperlipidemia  Continue statin therapy         VTE Risk Mitigation (From admission, onward)         Ordered      IP VTE LOW RISK PATIENT  Once         10/12/23 1524     Place sequential compression device  Until discontinued         10/12/23 1524                           Stuart Kaba MD  Department of Hospital Medicine  [facility] - [address] [place] [address]

## 2023-10-12 NOTE — SUBJECTIVE & OBJECTIVE
Current Facility-Administered Medications on File Prior to Encounter   Medication    [COMPLETED] fentaNYL 50 mcg/mL injection    [COMPLETED] midazolam (VERSED) 1 mg/mL injection     Current Outpatient Medications on File Prior to Encounter   Medication Sig    albuterol (PROVENTIL HFA) 90 mcg/actuation inhaler Inhale 2 puffs into the lungs every 6 (six) hours as needed for Wheezing. Rescue    aspirin (ECOTRIN) 81 MG EC tablet Take 81 mg by mouth once daily.    budesonide (PULMICORT FLEXHALER) 90 mcg/actuation AePB Inhale 2 puffs (180 mcg total) into the lungs 2 (two) times a day. Controller    carvediloL (COREG) 6.25 MG tablet Take 1 tablet by mouth once daily.    clopidogreL (PLAVIX) 75 mg tablet Take by mouth.    losartan (COZAAR) 50 MG tablet Take 50 mg by mouth once daily.    rosuvastatin (CRESTOR) 20 MG tablet Take 1 tablet (20 mg total) by mouth once daily.    [DISCONTINUED] clindamycin (CLEOCIN) 300 MG capsule Take 1 capsule (300 mg total) by mouth every 6 (six) hours. (Patient not taking: Reported on 9/18/2023)    [DISCONTINUED] clopidogreL (PLAVIX) 75 mg tablet Take 75 mg by mouth once daily.       Review of patient's allergies indicates:   Allergen Reactions    Penicillins Rash     Other reaction(s): Hives, Unknown       Past Medical History:   Diagnosis Date    Asthma     CAD (coronary artery disease)     Compressed cervical disc 07/2023    COPD (chronic obstructive pulmonary disease)     Hyperlipidemia     Hypertension     Low back pain     Tobacco dependence due to cigarettes      Past Surgical History:   Procedure Laterality Date    APPENDECTOMY      BACK SURGERY      x3    HAND SURGERY      ROTATOR CUFF REPAIR Right     SINUS SURGERY       Family History       Problem Relation (Age of Onset)    Cancer Other    Diabetes Other    Epilepsy Other    Heart disease Other    Hypertension Other    Stroke Other          Tobacco Use    Smoking status: Every Day    Smokeless tobacco: Never   Substance and Sexual  Activity    Alcohol use: Yes    Drug use: Never    Sexual activity: Yes     Review of Systems   Constitutional:  Positive for unexpected weight change.   Respiratory:  Positive for shortness of breath.         Right side lateral chest wall pain at insertion site   Neurological:  Negative for light-headedness.   All other systems reviewed and are negative.    Objective:     Vital Signs (Most Recent):  Temp: 98.2 °F (36.8 °C) (10/12/23 1420)  Pulse: 61 (10/12/23 1420)  Resp: 15 (10/12/23 1420)  BP: (!) 141/77 (10/12/23 1420)  SpO2: 99 % (10/12/23 1420) Vital Signs (24h Range):  Temp:  [98.2 °F (36.8 °C)] 98.2 °F (36.8 °C)  Pulse:  [59-73] 61  Resp:  [11-20] 15  SpO2:  [97 %-100 %] 99 %  BP: (103-141)/(64-83) 141/77     Weight: 59 kg (130 lb)  Body mass index is 18.13 kg/m².     Physical Exam  Vitals reviewed.   Constitutional:       General: He is not in acute distress.  Eyes:      Extraocular Movements: Extraocular movements intact.   Cardiovascular:      Rate and Rhythm: Normal rate.      Pulses: Normal pulses.   Pulmonary:      Effort: Pulmonary effort is normal.      Breath sounds: Examination of the right-middle field reveals decreased breath sounds. Decreased breath sounds present.   Abdominal:      General: Bowel sounds are normal. There is no distension.      Palpations: Abdomen is soft.   Skin:     General: Skin is warm and dry.      Capillary Refill: Capillary refill takes less than 2 seconds.   Neurological:      General: No focal deficit present.      Mental Status: He is alert and oriented to person, place, and time.   Psychiatric:         Mood and Affect: Mood normal.         Behavior: Behavior normal.            I have reviewed all pertinent lab results within the past 24 hours.    Significant Diagnostics:  I have reviewed all pertinent imaging results/findings within the past 24 hours.

## 2023-10-12 NOTE — HPI
62 y/o male who is seen in consult today for a right sided pneumothorax. He is followed by Dr. Lang for a lung mass that was unresponsive to antibiotics. He is s/p right lung CT guided lung biopsy today by IR and free air was seen on imaging. CXR afterward showed a small right apicolateral pneumothorax measuring 0.3 cm. A subsequent CXR showed interval enlargement of a right apical pneumothorax now measuring 0.6 cm. A pigtail catheter was successfully placed.     PMHx of CAD, COPD, HTN and tobacco dependence.

## 2023-10-12 NOTE — CONSULTS
[facility] - [facility] Tele[facility]  General Surgery  Consult Note    Patient Name: Garett Banegas  MRN: [id number]  Code Status: Full Code  Admission Date: 10/12/2023  Hospital Length of Stay: 0 days  Attending Physician: Dmitri Concepcion MD  Primary Care Provider: Giancarlo Azar MD    Patient information was obtained from patient and ER records.     Consults  Subjective:     Principal Problem: Pneumothorax after biopsy    History of Present Illness: 62 y/o male who is seen in consult today for a right sided pneumothorax. He is followed by Dr. Lang for a lung mass that was unresponsive to antibiotics. He is s/p right lung CT guided lung biopsy today by IR and free air was seen on imaging. CXR afterward showed a small right apicolateral pneumothorax measuring 0.3 cm. A subsequent CXR showed interval enlargement of a right apical pneumothorax now measuring 0.6 cm. A pigtail catheter was successfully placed.     PMHx of CAD, COPD, HTN and tobacco dependence.      Current Facility-Administered Medications on File Prior to Encounter   Medication    [COMPLETED] fentaNYL 50 mcg/mL injection    [COMPLETED] midazolam (VERSED) 1 mg/mL injection     Current Outpatient Medications on File Prior to Encounter   Medication Sig    albuterol (PROVENTIL HFA) 90 mcg/actuation inhaler Inhale 2 puffs into the lungs every 6 (six) hours as needed for Wheezing. Rescue    aspirin (ECOTRIN) 81 MG EC tablet Take 81 mg by mouth once daily.    budesonide (PULMICORT FLEXHALER) 90 mcg/actuation AePB Inhale 2 puffs (180 mcg total) into the lungs 2 (two) times a day. Controller    carvediloL (COREG) 6.25 MG tablet Take 1 tablet by mouth once daily.    clopidogreL (PLAVIX) 75 mg tablet Take by mouth.    losartan (COZAAR) 50 MG tablet Take 50 mg by mouth once daily.    rosuvastatin (CRESTOR) 20 MG tablet Take 1 tablet (20 mg total) by mouth once daily.    [DISCONTINUED] clindamycin (CLEOCIN) 300 MG capsule Take 1 capsule  (300 mg total) by mouth every 6 (six) hours. (Patient not taking: Reported on 9/18/2023)    [DISCONTINUED] clopidogreL (PLAVIX) 75 mg tablet Take 75 mg by mouth once daily.       Review of patient's allergies indicates:   Allergen Reactions    Penicillins Rash     Other reaction(s): Hives, Unknown       Past Medical History:   Diagnosis Date    Asthma     CAD (coronary artery disease)     Compressed cervical disc 07/2023    COPD (chronic obstructive pulmonary disease)     Hyperlipidemia     Hypertension     Low back pain     Tobacco dependence due to cigarettes      Past Surgical History:   Procedure Laterality Date    APPENDECTOMY      BACK SURGERY      x3    HAND SURGERY      ROTATOR CUFF REPAIR Right     SINUS SURGERY       Family History       Problem Relation (Age of Onset)    Cancer Other    Diabetes Other    Epilepsy Other    Heart disease Other    Hypertension Other    Stroke Other          Tobacco Use    Smoking status: Every Day    Smokeless tobacco: Never   Substance and Sexual Activity    Alcohol use: Yes    Drug use: Never    Sexual activity: Yes     Review of Systems   Constitutional:  Positive for unexpected weight change.   Respiratory:  Positive for shortness of breath.         Right side lateral chest wall pain at insertion site   Neurological:  Negative for light-headedness.   All other systems reviewed and are negative.    Objective:     Vital Signs (Most Recent):  Temp: 98.2 °F (36.8 °C) (10/12/23 1420)  Pulse: 61 (10/12/23 1420)  Resp: 15 (10/12/23 1420)  BP: (!) 141/77 (10/12/23 1420)  SpO2: 99 % (10/12/23 1420) Vital Signs (24h Range):  Temp:  [98.2 °F (36.8 °C)] 98.2 °F (36.8 °C)  Pulse:  [59-73] 61  Resp:  [11-20] 15  SpO2:  [97 %-100 %] 99 %  BP: (103-141)/(64-83) 141/77     Weight: 59 kg (130 lb)  Body mass index is 18.13 kg/m².     Physical Exam  Vitals reviewed.   Constitutional:       General: He is not in acute distress.  Eyes:      Extraocular Movements:  Extraocular movements intact.   Cardiovascular:      Rate and Rhythm: Normal rate.      Pulses: Normal pulses.   Pulmonary:      Effort: Pulmonary effort is normal.      Breath sounds: Examination of the right-middle field reveals decreased breath sounds. Decreased breath sounds present.   Abdominal:      General: Bowel sounds are normal. There is no distension.      Palpations: Abdomen is soft.   Skin:     General: Skin is warm and dry.      Capillary Refill: Capillary refill takes less than 2 seconds.   Neurological:      General: No focal deficit present.      Mental Status: He is alert and oriented to person, place, and time.   Psychiatric:         Mood and Affect: Mood normal.         Behavior: Behavior normal.            I have reviewed all pertinent lab results within the past 24 hours.    Significant Diagnostics:  I have reviewed all pertinent imaging results/findings within the past 24 hours.      Assessment/Plan:     * Pneumothorax after biopsy  10/12/23  CXR showed  right apical pneumothorax now measuring 0.6 cm.   pigtail catheter to suction  Repeat cxr in the morning  If pneumo improved will place chest drainage system to water seal      VTE Risk Mitigation (From admission, onward)         Ordered     IP VTE LOW RISK PATIENT  Once         10/12/23 1524     Place sequential compression device  Until discontinued         10/12/23 1524                Thank you for your consult. I will follow-up with patient. Please contact us if you have any additional questions.    Garett Dooley, LAURA  General Surgery  [facility] - [facility]

## 2023-10-12 NOTE — SUBJECTIVE & OBJECTIVE
Past Medical History:   Diagnosis Date    Asthma     CAD (coronary artery disease)     Compressed cervical disc 07/2023    COPD (chronic obstructive pulmonary disease)     Hyperlipidemia     Hypertension     Low back pain     Tobacco dependence due to cigarettes        Past Surgical History:   Procedure Laterality Date    APPENDECTOMY      BACK SURGERY      x3    HAND SURGERY      ROTATOR CUFF REPAIR Right     SINUS SURGERY         Review of patient's allergies indicates:   Allergen Reactions    Penicillins Rash     Other reaction(s): Hives, Unknown       Current Facility-Administered Medications on File Prior to Encounter   Medication    [COMPLETED] fentaNYL 50 mcg/mL injection    [COMPLETED] midazolam (VERSED) 1 mg/mL injection     Current Outpatient Medications on File Prior to Encounter   Medication Sig    albuterol (PROVENTIL HFA) 90 mcg/actuation inhaler Inhale 2 puffs into the lungs every 6 (six) hours as needed for Wheezing. Rescue    aspirin (ECOTRIN) 81 MG EC tablet Take 81 mg by mouth once daily.    budesonide (PULMICORT FLEXHALER) 90 mcg/actuation AePB Inhale 2 puffs (180 mcg total) into the lungs 2 (two) times a day. Controller    carvediloL (COREG) 6.25 MG tablet Take 1 tablet by mouth once daily.    clopidogreL (PLAVIX) 75 mg tablet Take by mouth.    losartan (COZAAR) 50 MG tablet Take 50 mg by mouth once daily.    rosuvastatin (CRESTOR) 20 MG tablet Take 1 tablet (20 mg total) by mouth once daily.    [DISCONTINUED] clindamycin (CLEOCIN) 300 MG capsule Take 1 capsule (300 mg total) by mouth every 6 (six) hours. (Patient not taking: Reported on 9/18/2023)    [DISCONTINUED] clopidogreL (PLAVIX) 75 mg tablet Take 75 mg by mouth once daily.     Family History       Problem Relation (Age of Onset)    Cancer Other    Diabetes Other    Epilepsy Other    Heart disease Other    Hypertension Other    Stroke Other          Tobacco Use    Smoking status: Every Day    Smokeless tobacco: Never   Substance and  Sexual Activity    Alcohol use: Yes    Drug use: Never    Sexual activity: Yes     Review of Systems   Constitutional:  Negative for chills and fatigue.   HENT:  Negative for drooling, ear pain and mouth sores.    Respiratory:  Positive for cough and shortness of breath. Negative for wheezing and stridor.    Cardiovascular:  Negative for chest pain, palpitations and leg swelling.   Gastrointestinal:  Positive for constipation. Negative for abdominal distention, abdominal pain and diarrhea.   Genitourinary:  Negative for difficulty urinating and frequency.   Allergic/Immunologic: Negative for environmental allergies and food allergies.   Neurological:  Negative for facial asymmetry, light-headedness, numbness and headaches.   Psychiatric/Behavioral:  Negative for behavioral problems, confusion, dysphoric mood and hallucinations.    All other systems reviewed and are negative.    Objective:     Vital Signs (Most Recent):  Temp: 98.2 °F (36.8 °C) (10/12/23 1420)  Pulse: 61 (10/12/23 1420)  Resp: 15 (10/12/23 1420)  BP: (!) 141/77 (10/12/23 1420)  SpO2: 99 % (10/12/23 1420) Vital Signs (24h Range):  Temp:  [98.2 °F (36.8 °C)] 98.2 °F (36.8 °C)  Pulse:  [59-73] 61  Resp:  [11-20] 15  SpO2:  [97 %-100 %] 99 %  BP: (103-141)/(64-83) 141/77     Weight: 59 kg (130 lb)  Body mass index is 18.13 kg/m².     Physical Exam  Vitals reviewed.   Constitutional:       General: He is awake. He is not in acute distress.     Appearance: Normal appearance. He is well-developed, well-groomed and underweight. He is not ill-appearing.   HENT:      Head: Normocephalic and atraumatic.      Mouth/Throat:      Mouth: Mucous membranes are moist.      Pharynx: Oropharynx is clear.   Eyes:      Conjunctiva/sclera: Conjunctivae normal.   Cardiovascular:      Rate and Rhythm: Normal rate and regular rhythm.      Pulses: Normal pulses.      Heart sounds: Normal heart sounds.   Pulmonary:      Effort: Pulmonary effort is normal.      Breath sounds:  Examination of the right-upper field reveals decreased breath sounds. Examination of the right-middle field reveals decreased breath sounds. Examination of the right-lower field reveals decreased breath sounds. Decreased breath sounds present.   Chest:       Abdominal:      General: Bowel sounds are normal.   Musculoskeletal:      Right lower leg: No edema.      Left lower leg: No edema.   Skin:     General: Skin is warm and dry.   Neurological:      Mental Status: He is alert and oriented to person, place, and time.   Psychiatric:         Mood and Affect: Mood normal.         Behavior: Behavior normal. Behavior is cooperative.                Significant Labs: All pertinent labs within the past 24 hours have been reviewed.    Significant Imaging: I have reviewed all pertinent imaging results/findings within the past 24 hours.

## 2023-10-13 LAB
ALBUMIN SERPL BCP-MCNC: 3.1 G/DL (ref 3.5–5)
ALBUMIN/GLOB SERPL: 0.8 {RATIO}
ALP SERPL-CCNC: 106 U/L (ref 45–115)
ALT SERPL W P-5'-P-CCNC: 31 U/L (ref 16–61)
ANION GAP SERPL CALCULATED.3IONS-SCNC: 10 MMOL/L (ref 7–16)
AST SERPL W P-5'-P-CCNC: 21 U/L (ref 15–37)
BASOPHILS # BLD AUTO: 0.05 K/UL (ref 0–0.2)
BASOPHILS NFR BLD AUTO: 0.7 % (ref 0–1)
BILIRUB SERPL-MCNC: 0.2 MG/DL (ref ?–1.2)
BUN SERPL-MCNC: 11 MG/DL (ref 7–18)
BUN/CREAT SERPL: 12 (ref 6–20)
CALCIUM SERPL-MCNC: 8.6 MG/DL (ref 8.5–10.1)
CHLORIDE SERPL-SCNC: 110 MMOL/L (ref 98–107)
CO2 SERPL-SCNC: 25 MMOL/L (ref 21–32)
CREAT SERPL-MCNC: 0.91 MG/DL (ref 0.7–1.3)
DHEA SERPL-MCNC: NORMAL
DIFFERENTIAL METHOD BLD: ABNORMAL
EGFR (NO RACE VARIABLE) (RUSH/TITUS): 95 ML/MIN/1.73M2
EOSINOPHIL # BLD AUTO: 0.34 K/UL (ref 0–0.5)
EOSINOPHIL NFR BLD AUTO: 5 % (ref 1–4)
ERYTHROCYTE [DISTWIDTH] IN BLOOD BY AUTOMATED COUNT: 13.1 % (ref 11.5–14.5)
ESTROGEN SERPL-MCNC: NORMAL PG/ML
GLOBULIN SER-MCNC: 4 G/DL (ref 2–4)
GLUCOSE SERPL-MCNC: 116 MG/DL (ref 74–106)
HCT VFR BLD AUTO: 36.9 % (ref 40–54)
HGB BLD-MCNC: 12.7 G/DL (ref 13.5–18)
IMM GRANULOCYTES # BLD AUTO: 0.02 K/UL (ref 0–0.04)
IMM GRANULOCYTES NFR BLD: 0.3 % (ref 0–0.4)
INSULIN SERPL-ACNC: NORMAL U[IU]/ML
LAB AP CLINICAL INFORMATION: NORMAL
LAB AP GROSS DESCRIPTION: NORMAL
LAB AP LABORATORY NOTES: NORMAL
LYMPHOCYTES # BLD AUTO: 1.87 K/UL (ref 1–4.8)
LYMPHOCYTES NFR BLD AUTO: 27.3 % (ref 27–41)
MCH RBC QN AUTO: 32.1 PG (ref 27–31)
MCHC RBC AUTO-ENTMCNC: 34.4 G/DL (ref 32–36)
MCV RBC AUTO: 93.2 FL (ref 80–96)
MONOCYTES # BLD AUTO: 0.55 K/UL (ref 0–0.8)
MONOCYTES NFR BLD AUTO: 8 % (ref 2–6)
MPC BLD CALC-MCNC: 10.3 FL (ref 9.4–12.4)
NEUTROPHILS # BLD AUTO: 4.01 K/UL (ref 1.8–7.7)
NEUTROPHILS NFR BLD AUTO: 58.7 % (ref 53–65)
NRBC # BLD AUTO: 0 X10E3/UL
NRBC, AUTO (.00): 0 %
PLATELET # BLD AUTO: 295 K/UL (ref 150–400)
POTASSIUM SERPL-SCNC: 3.7 MMOL/L (ref 3.5–5.1)
PROT SERPL-MCNC: 7.1 G/DL (ref 6.4–8.2)
RBC # BLD AUTO: 3.96 M/UL (ref 4.6–6.2)
SODIUM SERPL-SCNC: 141 MMOL/L (ref 136–145)
T3RU NFR SERPL: NORMAL %
WBC # BLD AUTO: 6.84 K/UL (ref 4.5–11)

## 2023-10-13 PROCEDURE — 85025 COMPLETE CBC W/AUTO DIFF WBC: CPT

## 2023-10-13 PROCEDURE — 25000242 PHARM REV CODE 250 ALT 637 W/ HCPCS

## 2023-10-13 PROCEDURE — 80053 COMPREHEN METABOLIC PANEL: CPT

## 2023-10-13 PROCEDURE — 99233 SBSQ HOSP IP/OBS HIGH 50: CPT | Mod: ,,, | Performed by: STUDENT IN AN ORGANIZED HEALTH CARE EDUCATION/TRAINING PROGRAM

## 2023-10-13 PROCEDURE — 25000003 PHARM REV CODE 250

## 2023-10-13 PROCEDURE — 25000003 PHARM REV CODE 250: Performed by: REGISTERED NURSE

## 2023-10-13 PROCEDURE — 94761 N-INVAS EAR/PLS OXIMETRY MLT: CPT

## 2023-10-13 PROCEDURE — 94640 AIRWAY INHALATION TREATMENT: CPT

## 2023-10-13 PROCEDURE — 99232 PR SUBSEQUENT HOSPITAL CARE,LEVL II: ICD-10-PCS | Mod: ,,, | Performed by: INTERNAL MEDICINE

## 2023-10-13 PROCEDURE — 11000001 HC ACUTE MED/SURG PRIVATE ROOM

## 2023-10-13 PROCEDURE — 99233 PR SUBSEQUENT HOSPITAL CARE,LEVL III: ICD-10-PCS | Mod: ,,, | Performed by: STUDENT IN AN ORGANIZED HEALTH CARE EDUCATION/TRAINING PROGRAM

## 2023-10-13 PROCEDURE — 99900035 HC TECH TIME PER 15 MIN (STAT)

## 2023-10-13 PROCEDURE — 99232 SBSQ HOSP IP/OBS MODERATE 35: CPT | Mod: ,,, | Performed by: INTERNAL MEDICINE

## 2023-10-13 RX ADMIN — LOSARTAN POTASSIUM 50 MG: 50 TABLET, FILM COATED ORAL at 08:10

## 2023-10-13 RX ADMIN — HYDROCODONE BITARTRATE AND ACETAMINOPHEN 1 TABLET: 7.5; 325 TABLET ORAL at 03:10

## 2023-10-13 RX ADMIN — IPRATROPIUM BROMIDE AND ALBUTEROL SULFATE 3 ML: .5; 3 SOLUTION RESPIRATORY (INHALATION) at 07:10

## 2023-10-13 RX ADMIN — HYDROCODONE BITARTRATE AND ACETAMINOPHEN 1 TABLET: 7.5; 325 TABLET ORAL at 05:10

## 2023-10-13 RX ADMIN — BUDESONIDE 0.25 MG: 0.25 SUSPENSION RESPIRATORY (INHALATION) at 07:10

## 2023-10-13 RX ADMIN — CARVEDILOL 6.25 MG: 6.25 TABLET, FILM COATED ORAL at 08:10

## 2023-10-13 RX ADMIN — IPRATROPIUM BROMIDE AND ALBUTEROL SULFATE 3 ML: .5; 3 SOLUTION RESPIRATORY (INHALATION) at 12:10

## 2023-10-13 RX ADMIN — ATORVASTATIN CALCIUM 40 MG: 40 TABLET, FILM COATED ORAL at 08:10

## 2023-10-13 RX ADMIN — ASPIRIN 81 MG: 81 TABLET, COATED ORAL at 08:10

## 2023-10-13 RX ADMIN — HYDROCODONE BITARTRATE AND ACETAMINOPHEN 1 TABLET: 7.5; 325 TABLET ORAL at 09:10

## 2023-10-13 RX ADMIN — CLOPIDOGREL BISULFATE 75 MG: 75 TABLET ORAL at 08:10

## 2023-10-13 NOTE — SUBJECTIVE & OBJECTIVE
Interval History: Patient in bed resting no distress noted. Patient states he is breathing fine. Will get CXR today to monitor pneumothorax.    Review of Systems   Constitutional:  Positive for unexpected weight change. Negative for fatigue and fever.   HENT:  Negative for facial swelling, hearing loss, mouth sores, postnasal drip, rhinorrhea, sinus pressure, sinus pain and sore throat.    Eyes:  Negative for pain, discharge and itching.   Respiratory:  Positive for shortness of breath. Negative for cough and choking.         Right side lateral chest wall pain at insertion site   Cardiovascular:  Negative for chest pain and leg swelling.   Gastrointestinal:  Negative for abdominal distention, abdominal pain, constipation, diarrhea and nausea.   Endocrine: Negative for cold intolerance.   Genitourinary:  Negative for difficulty urinating and dysuria.   Musculoskeletal:  Negative for arthralgias.   Skin:  Negative for color change and wound.   Allergic/Immunologic: Negative for environmental allergies, food allergies and immunocompromised state.   Neurological:  Negative for light-headedness and headaches.   Hematological:  Negative for adenopathy. Does not bruise/bleed easily.   Psychiatric/Behavioral:  Negative for agitation, behavioral problems, confusion and decreased concentration.    All other systems reviewed and are negative.    Objective:     Vital Signs (Most Recent):  Temp: 97.8 °F (36.6 °C) (10/13/23 1033)  Pulse: 80 (10/13/23 1211)  Resp: 20 (10/13/23 1211)  BP: 134/77 (10/13/23 1033)  SpO2: 98 % (10/13/23 1211) Vital Signs (24h Range):  Temp:  [97.4 °F (36.3 °C)-98.3 °F (36.8 °C)] 97.8 °F (36.6 °C)  Pulse:  [61-89] 80  Resp:  [15-20] 20  SpO2:  [97 %-99 %] 98 %  BP: (121-149)/() 134/77     Weight: 59 kg (130 lb)  Body mass index is 18.13 kg/m².    Intake/Output Summary (Last 24 hours) at 10/13/2023 1348  Last data filed at 10/13/2023 0887  Gross per 24 hour   Intake 120 ml   Output 600 ml   Net  -480 ml         Physical Exam  Vitals reviewed.   Constitutional:       General: He is not in acute distress.  Eyes:      Extraocular Movements: Extraocular movements intact.   Cardiovascular:      Rate and Rhythm: Normal rate.      Pulses: Normal pulses.   Pulmonary:      Effort: Pulmonary effort is normal.      Breath sounds: Examination of the right-middle field reveals decreased breath sounds. Decreased breath sounds present.   Abdominal:      General: Bowel sounds are normal. There is no distension.      Palpations: Abdomen is soft.   Skin:     General: Skin is warm and dry.      Capillary Refill: Capillary refill takes less than 2 seconds.   Neurological:      General: No focal deficit present.      Mental Status: He is alert and oriented to person, place, and time.   Psychiatric:         Mood and Affect: Mood normal.         Behavior: Behavior normal.             Significant Labs: All pertinent labs within the past 24 hours have been reviewed.  BMP:   Recent Labs   Lab 10/13/23  0328   *      K 3.7   *   CO2 25   BUN 11   CREATININE 0.91   CALCIUM 8.6     CBC:   Recent Labs   Lab 10/12/23  0738 10/13/23  0328   WBC 10.47 6.84   HGB 12.6* 12.7*   HCT 36.9* 36.9*    295       Significant Imaging: I have reviewed all pertinent imaging results/findings within the past 24 hours.

## 2023-10-13 NOTE — PLAN OF CARE
[facility] Tele[facility]  Initial Discharge Assessment       Primary Care Provider: Giancarlo Azar MD    Admission Diagnosis: Pneumothorax after biopsy [J95.811]    Admission Date: 10/12/2023  Expected Discharge Date:          Payor: [employer] / Plan: [employer] / Product Type: PPO /     Extended Emergency Contact Information  Primary Emergency Contact: Mariel Banegas  Address: [address] [place] [address] [facility]  Home Phone: [phone]  Mobile Phone: [phone]  Relation: Spouse  Preferred language: English   needed? No    Discharge Plan A: Home with family  Discharge Plan B: Home      [facility], [place] - [address]  [address]  [facility] [address]  Phone: [phone] Fax: [phone]    [id number], [place] - [address] [facility]  [address] [facility] [address]  Phone: [phone] Fax: [phone]    [facility], [place] - [address]  [address] [place] [address] [place] [address]  Phone: [phone] Fax: [phone]      Initial Assessment (most recent)       Adult Discharge Assessment - 10/13/23 1024          Discharge Assessment    Assessment Type Discharge Planning Assessment     Source of Information patient     People in Home spouse     Equipment Currently Used at Home none     Readmission within 30 days? No     Patient currently being followed by outpatient case management? No     Do you currently have service(s) that help you manage your care at home? No     Do you take prescription medications? Yes     Do you have prescription coverage? Yes     Do you have any problems affording any of your prescribed medications? No     Is the patient taking medications as prescribed? yes     Are you on dialysis? No     Do you take coumadin? No     DME Needed Upon Discharge  none     Discharge Plan A Home with family      Discharge Plan B Home        Physical Activity    On average, how many days per week do you engage in moderate to strenuous exercise (like a brisk walk)? 6 days     On average, how many minutes do you engage in exercise at this level? 60 min        Financial Resource Strain    How hard is it for you to pay for the very basics like food, housing, medical care, and heating? Not hard at all        Housing Stability    In the last 12 months, was there a time when you were not able to pay the mortgage or rent on time? No     In the last 12 months, how many places have you lived? 1     In the last 12 months, was there a time when you did not have a steady place to sleep or slept in a shelter (including now)? No        Transportation Needs    In the past 12 months, has lack of transportation kept you from medical appointments or from getting medications? No     In the past 12 months, has lack of transportation kept you from meetings, work, or from getting things needed for daily living? No        Food Insecurity    Within the past 12 months, you worried that your food would run out before you got the money to buy more. Never true     Within the past 12 months, the food you bought just didn't last and you didn't have money to get more. Never true        Stress    Do you feel stress - tense, restless, nervous, or anxious, or unable to sleep at night because your mind is troubled all the time - these days? Only a little        Social Connections    In a typical week, how many times do you talk on the phone with family, friends, or neighbors? More than three times a week     How often do you get together with friends or relatives? More than three times a week     How often do you attend Temple or Hoahaoism services? 1 to 4 times per year     Do you belong to any clubs or organizations such as Temple groups, unions, fraternal or athletic groups, or school groups? No     How often do you attend meetings of the clubs or  organizations you belong to? Never     Are you , , , , never , or living with a partner?         Alcohol Use    Q1: How often do you have a drink containing alcohol? Monthly or less     Q2: How many drinks containing alcohol do you have on a typical day when you are drinking? Patient does not drink     Q3: How often do you have six or more drinks on one occasion? Never        OTHER    Name(s) of People in Home Mariel Rocha                   Spoke with pt at bedside. Pt lives at home with wife and daughter. Denies any hh/dme. Plans to return home at dc. SDOH complete. SS following for dc needs.

## 2023-10-13 NOTE — PROGRESS NOTES
[facility] - [address] [place] [address]  Adult Nutrition  First Assessment Note         Reason for Assessment  Reason For Assessment: identified at risk by screening criteria (MST5)   Nutrition Risk Screen: no indicators present    Assessment and Plan  Patient assessed for MST5. He was admitted 10/12 for pneumothorax after biopsy.     Patient is 130 pound with a BMI of 18.13 and is underweight. He has visible moderate wasting to temple, orbital and buccal regions. He reports weight loss of >35 pounds, however chart review does not support this. He has had no significant weight changes in the past year. He does report poor po intakes.     Per ASPEN guidelines, patient meets criteria for moderate protein-calorie malnutrition.     He is ordered a cardiac diet and is tolerating well. Intake 75% per flowsheet. Recommend continue current diet as able/appropriate and tolerated. Encourage good po intakes.    Last BM 10/12 per flowsheet.    Medications/labs reviewed. RD following.         Malnutrition  Is Patient Malnourished: Yes Malnutrition Assessment  Malnutrition Context: chronic illness  Malnutrition Level: moderate          Energy Intake (Malnutrition): less than or equal to 50% for greater than or equal to 1 month  Subcutaneous Fat (Malnutrition): moderate depletion  Muscle Mass (Malnutrition): moderate depletion   Orbital Region (Subcutaneous Fat Loss): moderate depletion   [place] Region (Muscle Loss): moderate depletion                 Skin Integrity  Elvin Risk Assessment  Sensory Perception: 4-->no impairment  Moisture: 4-->rarely moist  Activity: 4-->walks frequently  Mobility: 4-->no limitation  Nutrition: 4-->excellent  Friction and Shear: 3-->no apparent problem  Elvin Score: 23      Nutrition Diagnosis  Malnutrition (Moderate) related to Inadequate Caloric intake as evidenced by low BMI, NFPE findings      Nutrition Risk  Level of Risk/Frequency of Follow-up: low - moderate      Recent Labs   Lab  "10/13/23  0328   *     Comments on Glucose: Glucose elevated. No PMH DM    Nutrition Prescription / Recommendations  Recommendation/Intervention: Recommend continue current diet as able/appropriate and tolerated. Encourage good po intakes. Also recommend addition of Boost Plus TID to improve kcal/protein intakes.  Goals: Weight maintenance, intake % of meals and supplements  Nutrition Goal Status: new  Current Diet Order: Cardiac  Chewing or Swallowing Difficulty?: No Chewing or swallowing difficulty  Recommended Diet: Heart Healthy  Recommended Oral Supplement: No Oral Supplements  Is Nutrition Support Recommended: [facility] Support: No  Is Nutrition Education Recommended: No    Monitor and Evaluation  % current Intake: P.O. intake of 75 - 100 %  % intake to meet estimated needs: 75 - 100 %  Food and Nutrient Adminstration: diet order  Anthropometric Measurements: weight, weight change  Biochemical Data, Medical Tests and Procedures: electrolyte and renal panel, gastrointestinal profile, glucose/endocrine profile, inflammatory profile, lipid profile       Current Medical Diagnosis and Past Medical History  Diagnosis: pulmonary disease  Past Medical History:   Diagnosis Date    Asthma     CAD (coronary artery disease)     Compressed cervical disc 07/2023    COPD (chronic obstructive pulmonary disease)     Hyperlipidemia     Hypertension     Low back pain     Tobacco dependence due to cigarettes        Nutrition/Diet History       Lab/Procedures/Meds  Recent Labs   Lab 10/13/23  0328      K 3.7   BUN 11   CREATININE 0.91   CALCIUM 8.6   ALBUMIN 3.1*   *   ALT 31   AST 21   Note: Cl- elevated. Alb low.   Last A1c: No results found for: "HGBA1C"  Lab Results   Component Value Date    RBC 3.96 (L) 10/13/2023    HGB 12.7 (L) 10/13/2023    HCT 36.9 (L) 10/13/2023    MCV 93.2 10/13/2023    MCH 32.1 (H) 10/13/2023    MCHC 34.4 10/13/2023     Pertinent Labs Reviewed: reviewed  Pertinent " "Medications Reviewed: reviewed  Scheduled Meds:   albuterol-ipratropium  3 mL Nebulization Q6H WAKE    aspirin  81 mg Oral Daily    atorvastatin  40 mg Oral Daily    budesonide  0.25 mg Nebulization Daily    carvediloL  6.25 mg Oral Daily    clopidogreL  75 mg Oral Daily    losartan  50 mg Oral Daily     Continuous Infusions:  PRN Meds:.acetaminophen, dextrose 10%, dextrose 10%, glucagon (human recombinant), glucose, glucose, HYDROcodone-acetaminophen, melatonin, naloxone, ondansetron, polyethylene glycol, sodium chloride 0.9%      Anthropometrics  Temp: 97.8 °F (36.6 °C)  Height: 5' 11" (180.3 cm)  Height (inches): 71 in  Weight Method: Standard Scale  Weight: 59 kg (130 lb)  Weight (lb): 130 lb  Ideal Body Weight (IBW), Male: 172 lb  % Ideal Body Weight, Male (lb): 75.58 %  BMI (Calculated): 18.1       Estimated/Assessed Needs  RMR ([place]-St. Jeor Equation): 1411.81     Temp: 97.8 °F (36.6 °C)Oral  Weight Used For Calorie Calculations: 59 kg (130 lb)     Energy Calorie Requirements (kcal): 1769-2063kcal (30-35kcal/kg)  Weight Used For Protein Calculations: 59 kg (130 lb)  Protein Requirements: 71-100g (1.2-1.7g/kg)       RDA Method (mL): 1769       Nutrition by Nursing     Intake (%): 75%        Last Bowel Movement: 10/12/23                Nutrition Follow-Up  RD Follow-up?: Yes      Erika Wu, MS, RD, LD  Available via Secure Chat   "

## 2023-10-13 NOTE — ASSESSMENT & PLAN NOTE
CT guided biopsy of right lung 10/12/2023 - success tissue taken to pathology  CXR afterward showed a small right apicolateral pneumothorax measuring 0.3 cm.  A subsequent CXR showed interval enlargement of a right apical pneumothorax now measuring 0.6 cm  CT Chest tube placement - Successfully placed right-sided chest tube with interval reduction of the right-sided pneumothorax.   Surgery consulted to help with management, assistance appreciated     10/13  CXR today

## 2023-10-13 NOTE — SUBJECTIVE & OBJECTIVE
Interval History: no acute events. Pneumothorax improved on xr    Medications:  Continuous Infusions:  Scheduled Meds:   albuterol-ipratropium  3 mL Nebulization Q6H WAKE    aspirin  81 mg Oral Daily    atorvastatin  40 mg Oral Daily    budesonide  0.25 mg Nebulization Daily    carvediloL  6.25 mg Oral Daily    clopidogreL  75 mg Oral Daily    losartan  50 mg Oral Daily     PRN Meds:acetaminophen, dextrose 10%, dextrose 10%, glucagon (human recombinant), glucose, glucose, HYDROcodone-acetaminophen, melatonin, naloxone, ondansetron, polyethylene glycol, sodium chloride 0.9%     Review of patient's allergies indicates:   Allergen Reactions    Penicillins Rash     Other reaction(s): Hives, Unknown     Objective:     Vital Signs (Most Recent):  Temp: 98.3 °F (36.8 °C) (10/13/23 1428)  Pulse: 81 (10/13/23 1428)  Resp: 20 (10/13/23 1428)  BP: 124/86 (10/13/23 1428)  SpO2: 98 % (10/13/23 1428) Vital Signs (24h Range):  Temp:  [97.4 °F (36.3 °C)-98.3 °F (36.8 °C)] 98.3 °F (36.8 °C)  Pulse:  [66-89] 81  Resp:  [16-20] 20  SpO2:  [97 %-99 %] 98 %  BP: (121-149)/() 124/86     Weight: 59 kg (130 lb)  Body mass index is 18.13 kg/m².    Intake/Output - Last 3 Shifts         10/11 0700  10/12 0659 10/12 0700  10/13 0659 10/13 0700  10/14 0659    P.O.   120    Total Intake(mL/kg)   120 (2)    Urine (mL/kg/hr)  600     Total Output  600     Net  -600 +120           Stool Occurrence   1 x             Physical Exam  Vitals reviewed.   Constitutional:       General: He is not in acute distress.  Eyes:      Extraocular Movements: Extraocular movements intact.   Cardiovascular:      Rate and Rhythm: Normal rate.      Pulses: Normal pulses.   Pulmonary:      Effort: Pulmonary effort is normal.   Abdominal:      General: Bowel sounds are normal. There is no distension.      Palpations: Abdomen is soft.   Skin:     General: Skin is warm and dry.      Capillary Refill: Capillary refill takes less than 2 seconds.   Neurological:       General: No focal deficit present.      Mental Status: He is alert and oriented to person, place, and time.   Psychiatric:         Mood and Affect: Mood normal.         Behavior: Behavior normal.          Significant Labs:  I have reviewed all pertinent lab results within the past 24 hours.    Significant Diagnostics:  I have reviewed all pertinent imaging results/findings within the past 24 hours.

## 2023-10-13 NOTE — PROGRESS NOTES
[facility] - [address] [place] [address] Medicine  Progress Note    Patient Name: Garett Banegas  MRN: [id number]  Patient Class: IP- Inpatient   Admission Date: 10/12/2023  Length of Stay: 1 days  Attending Physician: Dmitri Concepcion MD  Primary Care Provider: Giancarlo Azar MD        Subjective:     Principal Problem:Pneumothorax after biopsy        HPI:  Patient is a 63 year old male that presents to [facility] for a right sided pneumothorax. He has a PMHx of CAD, COPD, HTN and tobacco dependence. The patient is followed by Dr. Lang for a lung mass that did not fully respond to antibiotics. Today he was received a lung CT guided lung biopsy on the right side performed by IR at this hospital when free air was seen on imaging. The biopsy itself was successful. 1st CXR afterward showed a small right apicolateral pneumothorax measuring 0.3 cm. A subsequent CXR showed interval enlargement of a right apical pneumothorax now measuring 0.6 cm. Following this a chest tube was successfully placed. General Surgery will be consulted to manage the chest tube.    The patient has slight baseline SOB due to still smoking a reported 1 pack of cigarettes per day. The patient does not use oxygen at home and rarely uses his albuterol rescue inhaler. He states his home prescribed budesonide is not effective so he does not use it. The patient will be admitted to [facility] under the direct supervision of Dr. Jamey AYALA for continued care and medical management.       Overview/Hospital Course:  No notes on file    Interval History: Patient in bed resting no distress noted. Patient states he is breathing fine. Will get CXR today to monitor pneumothorax.    Review of Systems   Constitutional:  Positive for unexpected weight change. Negative for fatigue and fever.   HENT:  Negative for facial swelling, hearing loss, mouth sores, postnasal drip, rhinorrhea, sinus pressure, sinus pain and sore  throat.    Eyes:  Negative for pain, discharge and itching.   Respiratory:  Positive for shortness of breath. Negative for cough and choking.         Right side lateral chest wall pain at insertion site   Cardiovascular:  Negative for chest pain and leg swelling.   Gastrointestinal:  Negative for abdominal distention, abdominal pain, constipation, diarrhea and nausea.   Endocrine: Negative for cold intolerance.   Genitourinary:  Negative for difficulty urinating and dysuria.   Musculoskeletal:  Negative for arthralgias.   Skin:  Negative for color change and wound.   Allergic/Immunologic: Negative for environmental allergies, food allergies and immunocompromised state.   Neurological:  Negative for light-headedness and headaches.   Hematological:  Negative for adenopathy. Does not bruise/bleed easily.   Psychiatric/Behavioral:  Negative for agitation, behavioral problems, confusion and decreased concentration.    All other systems reviewed and are negative.    Objective:     Vital Signs (Most Recent):  Temp: 97.8 °F (36.6 °C) (10/13/23 1033)  Pulse: 80 (10/13/23 1211)  Resp: 20 (10/13/23 1211)  BP: 134/77 (10/13/23 1033)  SpO2: 98 % (10/13/23 1211) Vital Signs (24h Range):  Temp:  [97.4 °F (36.3 °C)-98.3 °F (36.8 °C)] 97.8 °F (36.6 °C)  Pulse:  [61-89] 80  Resp:  [15-20] 20  SpO2:  [97 %-99 %] 98 %  BP: (121-149)/() 134/77     Weight: 59 kg (130 lb)  Body mass index is 18.13 kg/m².    Intake/Output Summary (Last 24 hours) at 10/13/2023 1348  Last data filed at 10/13/2023 0846  Gross per 24 hour   Intake 120 ml   Output 600 ml   Net -480 ml         Physical Exam  Vitals reviewed.   Constitutional:       General: He is not in acute distress.  Eyes:      Extraocular Movements: Extraocular movements intact.   Cardiovascular:      Rate and Rhythm: Normal rate.      Pulses: Normal pulses.   Pulmonary:      Effort: Pulmonary effort is normal.      Breath sounds: Examination of the right-middle field reveals decreased  breath sounds. Decreased breath sounds present.   Abdominal:      General: Bowel sounds are normal. There is no distension.      Palpations: Abdomen is soft.   Skin:     General: Skin is warm and dry.      Capillary Refill: Capillary refill takes less than 2 seconds.   Neurological:      General: No focal deficit present.      Mental Status: He is alert and oriented to person, place, and time.   Psychiatric:         Mood and Affect: Mood normal.         Behavior: Behavior normal.             Significant Labs: All pertinent labs within the past 24 hours have been reviewed.  BMP:   Recent Labs   Lab 10/13/23  0328   *      K 3.7   *   CO2 25   BUN 11   CREATININE 0.91   CALCIUM 8.6     CBC:   Recent Labs   Lab 10/12/23  0738 10/13/23  0328   WBC 10.47 6.84   HGB 12.6* 12.7*   HCT 36.9* 36.9*    295       Significant Imaging: I have reviewed all pertinent imaging results/findings within the past 24 hours.      Assessment/Plan:      * Pneumothorax after biopsy  CT guided biopsy of right lung 10/12/2023 - success tissue taken to pathology  CXR afterward showed a small right apicolateral pneumothorax measuring 0.3 cm.  A subsequent CXR showed interval enlargement of a right apical pneumothorax now measuring 0.6 cm  CT Chest tube placement - Successfully placed right-sided chest tube with interval reduction of the right-sided pneumothorax.   Surgery consulted to help with management, assistance appreciated     10/13  CXR today    Mixed hyperlipidemia  Continue statin therapy       Coronary artery disease involving native coronary artery of native heart without angina pectoris  Patient reports coronary stent placed in 2019  Continue home Aspirin and Plavix       COPD, severity to be determined  Patient followed by Dr. Lang  Patient still smoked 1 pack of cigarettes per day  On admission SpO2 100% on room air  Duonebs and budesonide ordered  2L NC oxygen to aid in healing of pneumothorax        Tobacco dependence due to cigarettes  Dangers of cigarette smoking were reviewed with patient in detail. Patient was Counseled for 3-10 minutes. Nicotine replacement options were discussed. Nicotine replacement was discussed- not prescribed per patient's request    Hypertension  Continue home Losartan        VTE Risk Mitigation (From admission, onward)         Ordered     IP VTE LOW RISK PATIENT  Once         10/12/23 1524     Place sequential compression device  Until discontinued         10/12/23 1524                Discharge Planning   KATY:      Code Status: Full Code   Is the patient medically ready for discharge?:     Reason for patient still in hospital (select all that apply): Treatment  Discharge Plan A: Home with family                  Jet Christian MD  Department of Hospital Medicine   [facility]

## 2023-10-13 NOTE — ASSESSMENT & PLAN NOTE
10/12/23  CXR showed  right apical pneumothorax now measuring 0.6 cm.   pigtail catheter to suction  Repeat cxr in the morning  If pneumo improved will place chest drainage system to water seal    10/13/23  Pneumothorax improved on xr  chest drainage system to water seal  xr repeat in morning if stable will remove chest cath and discharge

## 2023-10-13 NOTE — PLAN OF CARE
Problem: Adult Inpatient Plan of Care  Goal: Patient-Specific Goal (Individualized)  Outcome: Ongoing, Progressing  Goal: Absence of Hospital-Acquired Illness or Injury  Outcome: Ongoing, Progressing  Goal: Optimal Comfort and Wellbeing  Outcome: Ongoing, Progressing  Goal: Readiness for Transition of Care  Outcome: Ongoing, Progressing     Problem: Fall Injury Risk  Goal: Absence of Fall and Fall-Related Injury  Outcome: Ongoing, Progressing

## 2023-10-13 NOTE — PROGRESS NOTES
[facility] - [facility]  General Surgery  Progress Note    Subjective:     History of Present Illness:  62 y/o male who is seen in consult today for a right sided pneumothorax. He is followed by Dr. Lang for a lung mass that was unresponsive to antibiotics. He is s/p right lung CT guided lung biopsy today by IR and free air was seen on imaging. CXR afterward showed a small right apicolateral pneumothorax measuring 0.3 cm. A subsequent CXR showed interval enlargement of a right apical pneumothorax now measuring 0.6 cm. A pigtail catheter was successfully placed.     PMHx of CAD, COPD, HTN and tobacco dependence.      Post-Op Info:  * No surgery found *         Interval History: no acute events. Pneumothorax improved on xr    Medications:  Continuous Infusions:  Scheduled Meds:   albuterol-ipratropium  3 mL Nebulization Q6H WAKE    aspirin  81 mg Oral Daily    atorvastatin  40 mg Oral Daily    budesonide  0.25 mg Nebulization Daily    carvediloL  6.25 mg Oral Daily    clopidogreL  75 mg Oral Daily    losartan  50 mg Oral Daily     PRN Meds:acetaminophen, dextrose 10%, dextrose 10%, glucagon (human recombinant), glucose, glucose, HYDROcodone-acetaminophen, melatonin, naloxone, ondansetron, polyethylene glycol, sodium chloride 0.9%     Review of patient's allergies indicates:   Allergen Reactions    Penicillins Rash     Other reaction(s): Hives, Unknown     Objective:     Vital Signs (Most Recent):  Temp: 98.3 °F (36.8 °C) (10/13/23 1428)  Pulse: 81 (10/13/23 1428)  Resp: 20 (10/13/23 1428)  BP: 124/86 (10/13/23 1428)  SpO2: 98 % (10/13/23 1428) Vital Signs (24h Range):  Temp:  [97.4 °F (36.3 °C)-98.3 °F (36.8 °C)] 98.3 °F (36.8 °C)  Pulse:  [66-89] 81  Resp:  [16-20] 20  SpO2:  [97 %-99 %] 98 %  BP: (121-149)/() 124/86     Weight: 59 kg (130 lb)  Body mass index is 18.13 kg/m².    Intake/Output - Last 3 Shifts         10/11 0700  10/12 0659 10/12 0700  10/13 0659 10/13  0700  10/14 0659    P.O.   120    Total Intake(mL/kg)   120 (2)    Urine (mL/kg/hr)  600     Total Output  600     Net  -600 +120           Stool Occurrence   1 x             Physical Exam  Vitals reviewed.   Constitutional:       General: He is not in acute distress.  Eyes:      Extraocular Movements: Extraocular movements intact.   Cardiovascular:      Rate and Rhythm: Normal rate.      Pulses: Normal pulses.   Pulmonary:      Effort: Pulmonary effort is normal.   Abdominal:      General: Bowel sounds are normal. There is no distension.      Palpations: Abdomen is soft.   Skin:     General: Skin is warm and dry.      Capillary Refill: Capillary refill takes less than 2 seconds.   Neurological:      General: No focal deficit present.      Mental Status: He is alert and oriented to person, place, and time.   Psychiatric:         Mood and Affect: Mood normal.         Behavior: Behavior normal.          Significant Labs:  I have reviewed all pertinent lab results within the past 24 hours.    Significant Diagnostics:  I have reviewed all pertinent imaging results/findings within the past 24 hours.    Assessment/Plan:     * Pneumothorax after biopsy  10/12/23  CXR showed  right apical pneumothorax now measuring 0.6 cm.   pigtail catheter to suction  Repeat cxr in the morning  If pneumo improved will place chest drainage system to water seal    10/13/23  Pneumothorax improved on xr  chest drainage system to water seal  xr repeat in morning if stable will remove chest cath and discharge          LAURA Bishop  General Surgery  [facility] - [address] [place] [address]

## 2023-10-14 VITALS
HEART RATE: 65 BPM | RESPIRATION RATE: 20 BRPM | BODY MASS INDEX: 18.2 KG/M2 | SYSTOLIC BLOOD PRESSURE: 123 MMHG | HEIGHT: 71 IN | WEIGHT: 130 LBS | OXYGEN SATURATION: 99 % | DIASTOLIC BLOOD PRESSURE: 79 MMHG | TEMPERATURE: 98 F

## 2023-10-14 VITALS
BODY MASS INDEX: 18.13 KG/M2 | RESPIRATION RATE: 20 BRPM | SYSTOLIC BLOOD PRESSURE: 128 MMHG | WEIGHT: 130 LBS | HEART RATE: 68 BPM | DIASTOLIC BLOOD PRESSURE: 83 MMHG | OXYGEN SATURATION: 97 % | TEMPERATURE: 98 F

## 2023-10-14 PROCEDURE — 99232 PR SUBSEQUENT HOSPITAL CARE,LEVL II: ICD-10-PCS | Mod: ,,, | Performed by: INTERNAL MEDICINE

## 2023-10-14 PROCEDURE — 94761 N-INVAS EAR/PLS OXIMETRY MLT: CPT

## 2023-10-14 PROCEDURE — 99238 HOSP IP/OBS DSCHRG MGMT 30/<: CPT | Mod: ,,, | Performed by: SURGERY

## 2023-10-14 PROCEDURE — 25000003 PHARM REV CODE 250

## 2023-10-14 PROCEDURE — 25000242 PHARM REV CODE 250 ALT 637 W/ HCPCS

## 2023-10-14 PROCEDURE — 25000003 PHARM REV CODE 250: Performed by: REGISTERED NURSE

## 2023-10-14 PROCEDURE — 99232 SBSQ HOSP IP/OBS MODERATE 35: CPT | Mod: ,,, | Performed by: INTERNAL MEDICINE

## 2023-10-14 PROCEDURE — 99238 PR HOSPITAL DISCHARGE DAY,<30 MIN: ICD-10-PCS | Mod: ,,, | Performed by: SURGERY

## 2023-10-14 PROCEDURE — 94640 AIRWAY INHALATION TREATMENT: CPT

## 2023-10-14 PROCEDURE — 99900035 HC TECH TIME PER 15 MIN (STAT)

## 2023-10-14 RX ORDER — IBUPROFEN 200 MG
1 TABLET ORAL DAILY
Qty: 30 PATCH | Refills: 1 | Status: SHIPPED | OUTPATIENT
Start: 2023-10-14

## 2023-10-14 RX ADMIN — CARVEDILOL 6.25 MG: 6.25 TABLET, FILM COATED ORAL at 09:10

## 2023-10-14 RX ADMIN — ASPIRIN 81 MG: 81 TABLET, COATED ORAL at 09:10

## 2023-10-14 RX ADMIN — IPRATROPIUM BROMIDE AND ALBUTEROL SULFATE 3 ML: .5; 3 SOLUTION RESPIRATORY (INHALATION) at 01:10

## 2023-10-14 RX ADMIN — BUDESONIDE 0.25 MG: 0.25 SUSPENSION RESPIRATORY (INHALATION) at 07:10

## 2023-10-14 RX ADMIN — LOSARTAN POTASSIUM 50 MG: 50 TABLET, FILM COATED ORAL at 09:10

## 2023-10-14 RX ADMIN — HYDROCODONE BITARTRATE AND ACETAMINOPHEN 1 TABLET: 7.5; 325 TABLET ORAL at 01:10

## 2023-10-14 RX ADMIN — IPRATROPIUM BROMIDE AND ALBUTEROL SULFATE 3 ML: .5; 3 SOLUTION RESPIRATORY (INHALATION) at 07:10

## 2023-10-14 RX ADMIN — CLOPIDOGREL BISULFATE 75 MG: 75 TABLET ORAL at 09:10

## 2023-10-14 RX ADMIN — HYDROCODONE BITARTRATE AND ACETAMINOPHEN 1 TABLET: 7.5; 325 TABLET ORAL at 09:10

## 2023-10-14 RX ADMIN — ATORVASTATIN CALCIUM 40 MG: 40 TABLET, FILM COATED ORAL at 09:10

## 2023-10-14 NOTE — PLAN OF CARE
Problem: Adult Inpatient Plan of Care  Goal: Plan of Care Review  Outcome: Adequate for Care Transition  Goal: Patient-Specific Goal (Individualized)  Outcome: Adequate for Care Transition  Goal: Absence of Hospital-Acquired Illness or Injury  Outcome: Adequate for Care Transition  Goal: Optimal Comfort and Wellbeing  Outcome: Adequate for Care Transition  Goal: Readiness for Transition of Care  Outcome: Adequate for Care Transition     Problem: Fall Injury Risk  Goal: Absence of Fall and Fall-Related Injury  Outcome: Adequate for Care Transition     Problem: Airway Clearance Ineffective  Goal: Effective Airway Clearance  Outcome: Adequate for Care Transition

## 2023-10-14 NOTE — HOSPITAL COURSE
Today he was received a lung CT guided lung biopsy on the right side performed by IR at this hospital when free air was seen on imaging. The biopsy itself was successful. 1st CXR afterward showed a small right apicolateral pneumothorax measuring 0.3 cm. A subsequent CXR showed interval enlargement of a right apical pneumothorax now measuring 0.6 cm. Following this a chest tube was successfully placed. General Surgery will be consulted to manage the chest tube. Day 2 Patient in bed resting no distress noted. Patient states he is breathing fine. Will get CXR today to monitor pneumothorax. Day 3 chest tube removed chest xray does not show pneumothorax. Patient has reached maximum benefit from this admission. Follow up with PCP and Pulmonary  in 2 weeks. Patient verbalized understanding of instructions.

## 2023-10-14 NOTE — NURSING
Discharge paperwork reviewed with patient @ this time, acknowledged understanding. All lines removed and patients belongings returned. Patient ambulated off floor with family member @ side.

## 2023-10-14 NOTE — ASSESSMENT & PLAN NOTE
CT guided biopsy of right lung 10/12/2023 - success tissue taken to pathology  CXR afterward showed a small right apicolateral pneumothorax measuring 0.3 cm.  A subsequent CXR showed interval enlargement of a right apical pneumothorax now measuring 0.6 cm  CT Chest tube placement - Successfully placed right-sided chest tube with interval reduction of the right-sided pneumothorax.   Surgery consulted to help with management, assistance appreciated     10/13  CXR today    10/14   CXR out   Resoloved  Discharge home today.

## 2023-10-14 NOTE — DISCHARGE SUMMARY
[facility]  Discharge Note  Short Stay    * No surgery found *      OUTCOME: Patient tolerated treatment/procedure well without complication and is now ready for discharge.    DISPOSITION: Home or Self Care    Patient kept a couple of days in the hospital for observation for a pneumothorax after chest biopsy.  Discharge day he was doing well no shortness of breath chest tube was pulled with the x-ray showing good resolution.      FINAL DIAGNOSIS:  Pneumothorax after biopsy    FOLLOWUP: With primary care provider    DISCHARGE INSTRUCTIONS:    Discharge Procedure Orders   Ambulatory referral/consult to Pulmonology   Standing Status: Future   Referral Priority: Routine Referral Type: Consultation   Referral Reason: Specialty Services Required   Referred to Provider: RIKA DENNIS Requested Specialty: Pulmonary Disease   Number of Visits Requested: 1     Ambulatory referral/consult to Smoking Cessation Program   Standing Status: Future   Referral Priority: Routine Referral Type: Consultation   Referral Reason: Specialty Services Required   Requested Specialty: CTTS   Number of Visits Requested: 1     Activity as tolerated        TIME SPENT ON DISCHARGE: 10 minutes

## 2023-10-14 NOTE — DISCHARGE SUMMARY
[facility] - [address] [place] [address] Medicine  Discharge Summary      Patient Name: Garett Banegas  MRN: [id number]  [facility]: [id number]  Patient Class: IP- Inpatient  Admission Date: 10/12/2023  Hospital Length of Stay: 2 days  Discharge Date and Time:  10/14/2023 12:22 PM  Attending Physician: Dmitri Concepcion MD   Discharging Provider: Jet Christian MD  Primary Care Provider: Giancarlo Azar MD    Primary Care Team: Networked reference to record PCT     HPI:   Patient is a 63 year old male that presents to [facility] for a right sided pneumothorax. He has a PMHx of CAD, COPD, HTN and tobacco dependence. The patient is followed by Dr. Lang for a lung mass that did not fully respond to antibiotics. Today he was received a lung CT guided lung biopsy on the right side performed by IR at this hospital when free air was seen on imaging. The biopsy itself was successful. 1st CXR afterward showed a small right apicolateral pneumothorax measuring 0.3 cm. A subsequent CXR showed interval enlargement of a right apical pneumothorax now measuring 0.6 cm. Following this a chest tube was successfully placed. General Surgery will be consulted to manage the chest tube.    The patient has slight baseline SOB due to still smoking a reported 1 pack of cigarettes per day. The patient does not use oxygen at home and rarely uses his albuterol rescue inhaler. He states his home prescribed budesonide is not effective so he does not use it. The patient will be admitted to [facility] under the direct supervision of Dr. Jamey AYALA for continued care and medical management.       * No surgery found *      Hospital Course:   Today he was received a lung CT guided lung biopsy on the right side performed by IR at this hospital when free air was seen on imaging. The biopsy itself was successful. 1st CXR afterward showed a small right apicolateral pneumothorax measuring 0.3 cm. A subsequent CXR showed  interval enlargement of a right apical pneumothorax now measuring 0.6 cm. Following this a chest tube was successfully placed. General Surgery will be consulted to manage the chest tube. Day 2 Patient in bed resting no distress noted. Patient states he is breathing fine. Will get CXR today to monitor pneumothorax. Day 3 chest tube removed chest xray does not show pneumothorax. Patient has reached maximum benefit from this admission. Follow up with PCP and Pulmonary  in 2 weeks. Patient verbalized understanding of instructions.          Goals of Care Treatment Preferences:  Code Status: Full Code      Consults:     Pulmonary  * Pneumothorax after biopsy  CT guided biopsy of right lung 10/12/2023 - success tissue taken to pathology  CXR afterward showed a small right apicolateral pneumothorax measuring 0.3 cm.  A subsequent CXR showed interval enlargement of a right apical pneumothorax now measuring 0.6 cm  CT Chest tube placement - Successfully placed right-sided chest tube with interval reduction of the right-sided pneumothorax.   Surgery consulted to help with management, assistance appreciated     10/13  CXR today    10/14   CXR out   Resoloved  Discharge home today.      Final Active Diagnoses:    Diagnosis Date Noted POA    PRINCIPAL PROBLEM:  Pneumothorax after biopsy [J95.811] 10/12/2023 Yes    Tobacco dependence due to cigarettes [F17.210] 10/12/2023 Yes    COPD, severity to be determined [J44.9] 10/12/2023 Yes    Coronary artery disease involving native coronary artery of native heart without angina pectoris [I25.10] 10/12/2023 Yes    Mixed hyperlipidemia [E78.2] 10/12/2023 Yes    Hypertension [I10]  Yes      Problems Resolved During this Admission:       Discharged Condition: stable    Disposition: Home or Self Care    Follow Up:   Follow-up Information     Giancarlo Azar MD. Call in 1 week(s).    Specialties: Family Medicine, Emergency Medicine  Why: Hospital follow up  Contact information:  [address]  [facility] [address]  [phone]             Shemar Lang MD Follow up in 2 day(s).    Specialty: Pulmonary Disease  Why: Hospital follow up. Lung biopsy follow up after pneumothorax  Contact information:  [address] [place] [address]  [facility] [place] [address]  [phone]                       Patient Instructions:      Ambulatory referral/consult to Pulmonology   Standing Status: Future   Referral Priority: Routine Referral Type: Consultation   Referral Reason: Specialty Services Required   Referred to Provider: SHEMAR LANG Requested Specialty: Pulmonary Disease   Number of Visits Requested: 1     Ambulatory referral/consult to Smoking Cessation Program   Standing Status: Future   Referral Priority: Routine Referral Type: Consultation   Referral Reason: Specialty Services Required   Requested Specialty: CTTS   Number of Visits Requested: 1     Activity as tolerated       Significant Diagnostic Studies: Labs:   BMP:   Recent Labs   Lab 10/13/23  0328   *      K 3.7   *   CO2 25   BUN 11   CREATININE 0.91   CALCIUM 8.6    and CBC   Recent Labs   Lab 10/13/23  0328   WBC 6.84   HGB 12.7*   HCT 36.9*          Pending Diagnostic Studies:     None         Medications:  Reconciled Home Medications:      Medication List      START taking these medications    nicotine 21 mg/24 hr  Commonly known as: NICODERM CQ  Place 1 patch onto the skin once daily.        CONTINUE taking these medications    albuterol 90 mcg/actuation inhaler  Commonly known as: PROVENTIL HFA  Inhale 2 puffs into the lungs every 6 (six) hours as needed for Wheezing. Rescue     aspirin 81 MG EC tablet  Commonly known as: ECOTRIN  Take 81 mg by mouth once daily.     budesonide 90 mcg/actuation Aepb  Commonly known as: PULMICORT FLEXHALER  Inhale 2 puffs (180 mcg total) into the lungs 2 (two) times a day. Controller     carvediloL 6.25 MG  tablet  Commonly known as: COREG  Take 1 tablet by mouth once daily.     clopidogreL 75 mg tablet  Commonly known as: PLAVIX  Take by mouth.     losartan 50 MG tablet  Commonly known as: COZAAR  Take 50 mg by mouth once daily.     rosuvastatin 20 MG tablet  Commonly known as: CRESTOR  Take 1 tablet (20 mg total) by mouth once daily.            Indwelling Lines/Drains at time of discharge:   Lines/Drains/Airways     Drain  Duration                Chest Tube 10/12/23 9501 Right Midaxillary 1 day                Time spent on the discharge of patient: 45   minutes         Jet Christian MD  Department of Hospital Medicine  [facility]

## 2023-10-16 ENCOUNTER — CLINICAL SUPPORT (OUTPATIENT)
Dept: REHABILITATION | Facility: HOSPITAL | Age: 63
End: 2023-10-16
Payer: OTHER MISCELLANEOUS

## 2023-10-16 DIAGNOSIS — M54.59 POSTURAL LOW BACK PAIN: Primary | ICD-10-CM

## 2023-10-16 PROCEDURE — 97140 MANUAL THERAPY 1/> REGIONS: CPT | Mod: CQ

## 2023-10-16 PROCEDURE — 97012 MECHANICAL TRACTION THERAPY: CPT | Mod: CQ

## 2023-10-16 PROCEDURE — 97110 THERAPEUTIC EXERCISES: CPT | Mod: CQ

## 2023-10-16 NOTE — PROGRESS NOTES
[facility]   Physical Therapy Treatment Note     Name: Garett Banegas  [facility] Number: [id number]    Therapy Diagnosis: Postural low back pain     Physician: Mara Levin, NP-C    Visit Date: 10/16/2023     Physician Orders: PT Eval and Treat Low back  Medical Diagnosis from Referral: Postural low back pain   Evaluation Date: 10/3/2023  Authorization Period Expiration: 9/25/2024  Plan of Care Expiration: 11/28/2023  Progress Note Due: As needed  Visit # / Visits authorized: 3 / 16   FOTO: 44 /100    PTA Visit #: 1/5    Time In: 11:30 am  Time Out: 12:16 pm   Total Billable Time: 46 minutes    Precautions: multiple bulging discs   Functional Level at time of Evaluation:  Patient is working in constant pain with more limitations.     Subjective     Pt reports: 7-8/10 pain in low back; was in hospital for 3 days with chest tube  He was compliant with home exercise program.    Pain: 7/10  Location: Low Back with Radiculopathy to Bilateral Knees      Objective       Kalpesh received therapeutic exercises to develop strength, endurance, ROM, flexibility, posture, and core stabilization for 23 minutes including:    Back Exercises : multiple rest breaks needed due to significant shortness of breath     Bike  5 Min    Calf Stretch  4 x 15 sec    Hamstring Stretch  4 x 15 sec    Cybex Back  2 x 10 with 3 plates    Cybex Abdominals     Cybex Leg Press -  Single      Cybex Hamstring Curls   2 x 10 with 4 plates    Cybex Hip - Abduction     Cybex Hip - Flexion     Cybex Hip - Extension     Bridges  2x10   LTR  5x10 sec (B)   Seated Trunk Flexion with SB  5x10 sec fwd                   Kalpesh received the following manual therapy techniques: Joint mobilizations, Manual traction, Myofacial release, and Soft tissue Mobilization were applied to the: Low Back for 8 minutes, including:  Long Axis Traction  Trunk Rotation (B)      Kalpesh participated in neuromuscular re-education activities to  improve: Coordination, Kinesthetic, Proprioception, and Posture for 0 minutes. The following activities were included:     Cybex rows wide and close  Cable rotation  Cable palloff press  Lumbosacral rotation with multifidus activation/strengthening      Kalpesh received the following supervised modalities after being cleared for contradictions: Mechanical Traction:  Garett received intermittent mechanical traction to the lumbar spine at a force of 75 pounds for a total of 15 minutes. Hold time of 60 seconds and rest time for 20 second. Patient tolerated treatment well without any adverse effects.            Home Exercises Provided and Patient Education Provided     Education provided: We reviewed the Home Exercise Program that he was given at time of Evaluation.     Written Home Exercises Provided: Patient instructed to cont prior HEP.  Exercises were reviewed and Kalpesh was able to demonstrate them prior to the end of the session.  Kalpesh demonstrated good  understanding of the education provided.     See EMR under Patient Instructions for exercises provided prior visit.    Assessment     Pt has pretty significant pain in low back and B LOWER EXTREMITY. Moderate genu valgus in R knee. Tightness in B QL with R>L. Progressed lumbar mobility and strengthening exercises as able. Ended session with mechanical traction with same parameters as previous session. NO adverse effects at end of session. Educated pt to keep up with his HEP diligently.       PMH from Evaluation:   Garett is a 63 y.o. male referred to outpatient Physical Therapy with a medical diagnosis of Postural low back pain. Patient presents with Low back pain, abnormal posture, core weakness, decreased lumbar motion, decreased flexibility of hamstrings, hips and piriformis. Patient brought his MRI report from yesterday. MRI reveals the following: Disc bulging L2-3 with right neural canal narrowing, Disc bulging L3-4 with mild bilateral neural canal narrowing,  Disc bulging or pseudo bulging and osteophytic ridging related to degenerative anterolisthesis of L4-5 with bilateral neural canal narrowing. There is postoperative changes of previous fusion at this level and Disc bulging and bilateral neural canal narrowing L5-S1. Patient has positive sitting slump test and STRAIGHT LEG RAISE test bilateral. Patient will benefit from skilled Physical Therapy intervention to address all deficits and help patient to return to their prior level of function.         Kalpesh Is progressing well towards his goals.   Pt prognosis is Good.     Pt will continue to benefit from skilled outpatient physical therapy to address the deficits listed in the problem list box on initial evaluation, provide pt/family education and to maximize pt's level of independence in the home and community environment.     Pt's spiritual, cultural and educational needs considered and pt agreeable to plan of care and goals.     Anticipated barriers to physical therapy: None    Goals:  Short Term Goals: 4 weeks   1. Patient will be Independent with Home Exercise Program   2. Patient will demonstrate with improved Posture and Body Mechanics  3. Patient will increase Lumbosacral Range of Motion by 15%   4. Patient will increase Lower Extremity Strength to grossly 5/5  5. Patient will decrease complaints of pain with activity to Less than or Equal to 5/10      Long Term Goals: 8 weeks   1. Patient will tolerate 30 minutes of activity with complaints of Pain at Less Than or Equal to 3/10   2. Patient will increase Core Strength to grossly 4/5 or greater      Plan     Plan of care Certification: 10/3/2023 to 11/28/2023.     Outpatient Physical Therapy 2 times weekly for 8 weeks to include the following interventions: Lumbar Traction, Electrical Stimulation Pre-Mod, Manual Therapy, Moist Heat/ Ice, Neuromuscular Re-ed, Patient Education, Therapeutic Activities, and Therapeutic Exercise.     Stuart Torres, PTA  10/16/2023

## 2023-10-18 LAB — CULTURE, FUNGUS (OTHER): NORMAL

## 2023-10-23 ENCOUNTER — OFFICE VISIT (OUTPATIENT)
Dept: PULMONOLOGY | Facility: CLINIC | Age: 63
End: 2023-10-23
Payer: COMMERCIAL

## 2023-10-23 ENCOUNTER — CLINICAL SUPPORT (OUTPATIENT)
Dept: REHABILITATION | Facility: HOSPITAL | Age: 63
End: 2023-10-23
Payer: OTHER MISCELLANEOUS

## 2023-10-23 ENCOUNTER — CLINICAL SUPPORT (OUTPATIENT)
Dept: PULMONOLOGY | Facility: HOSPITAL | Age: 63
End: 2023-10-23
Attending: INTERNAL MEDICINE
Payer: COMMERCIAL

## 2023-10-23 ENCOUNTER — HOSPITAL ENCOUNTER (OUTPATIENT)
Dept: RADIOLOGY | Facility: HOSPITAL | Age: 63
Discharge: HOME | End: 2023-10-23
Attending: INTERNAL MEDICINE
Payer: COMMERCIAL

## 2023-10-23 VITALS
DIASTOLIC BLOOD PRESSURE: 76 MMHG | WEIGHT: 130 LBS | OXYGEN SATURATION: 97 % | HEIGHT: 71 IN | SYSTOLIC BLOOD PRESSURE: 118 MMHG | RESPIRATION RATE: 16 BRPM | HEART RATE: 79 BPM | BODY MASS INDEX: 18.2 KG/M2 | OXYGEN SATURATION: 97 %

## 2023-10-23 DIAGNOSIS — M54.59 POSTURAL LOW BACK PAIN: Primary | ICD-10-CM

## 2023-10-23 DIAGNOSIS — J85.1 ABSCESS OF UPPER LOBE OF RIGHT LUNG WITH PNEUMONIA: ICD-10-CM

## 2023-10-23 DIAGNOSIS — F17.210 TOBACCO DEPENDENCE DUE TO CIGARETTES: ICD-10-CM

## 2023-10-23 DIAGNOSIS — J44.9 COPD, SEVERITY TO BE DETERMINED: ICD-10-CM

## 2023-10-23 DIAGNOSIS — J18.9 PNEUMONIA OF RIGHT UPPER LOBE DUE TO INFECTIOUS ORGANISM: Primary | ICD-10-CM

## 2023-10-23 DIAGNOSIS — R06.02 SOB (SHORTNESS OF BREATH): ICD-10-CM

## 2023-10-23 PROCEDURE — 94729 DIFFUSING CAPACITY: CPT

## 2023-10-23 PROCEDURE — 94060 PR EVAL OF BRONCHOSPASM: ICD-10-PCS | Mod: 26,,, | Performed by: INTERNAL MEDICINE

## 2023-10-23 PROCEDURE — 94060 EVALUATION OF WHEEZING: CPT

## 2023-10-23 PROCEDURE — 94726 PLETHYSMOGRAPHY LUNG VOLUMES: CPT

## 2023-10-23 PROCEDURE — 94729 DIFFUSING CAPACITY: CPT | Mod: 26,,, | Performed by: INTERNAL MEDICINE

## 2023-10-23 PROCEDURE — 71046 X-RAY EXAM CHEST 2 VIEWS: CPT | Mod: TC

## 2023-10-23 PROCEDURE — 27100098 HC SPACER

## 2023-10-23 PROCEDURE — 97140 MANUAL THERAPY 1/> REGIONS: CPT | Mod: CQ

## 2023-10-23 PROCEDURE — 71046 XR CHEST PA AND LATERAL: ICD-10-PCS | Mod: 26,,, | Performed by: RADIOLOGY

## 2023-10-23 PROCEDURE — 94726 PLETHYSMOGRAPHY LUNG VOLUMES: CPT | Mod: 26,,, | Performed by: INTERNAL MEDICINE

## 2023-10-23 PROCEDURE — 71046 X-RAY EXAM CHEST 2 VIEWS: CPT | Mod: 26,,, | Performed by: RADIOLOGY

## 2023-10-23 PROCEDURE — 99214 OFFICE O/P EST MOD 30 MIN: CPT | Mod: S$PBB,25,, | Performed by: INTERNAL MEDICINE

## 2023-10-23 PROCEDURE — 97110 THERAPEUTIC EXERCISES: CPT | Mod: CQ

## 2023-10-23 PROCEDURE — 94726 PULM FUNCT TST PLETHYSMOGRAP: ICD-10-PCS | Mod: 26,,, | Performed by: INTERNAL MEDICINE

## 2023-10-23 PROCEDURE — 94060 EVALUATION OF WHEEZING: CPT | Mod: 26,,, | Performed by: INTERNAL MEDICINE

## 2023-10-23 PROCEDURE — 99215 OFFICE O/P EST HI 40 MIN: CPT | Mod: PBBFAC,25 | Performed by: INTERNAL MEDICINE

## 2023-10-23 PROCEDURE — 94729 PR C02/MEMBANE DIFFUSE CAPACITY: ICD-10-PCS | Mod: 26,,, | Performed by: INTERNAL MEDICINE

## 2023-10-23 PROCEDURE — 99214 PR OFFICE/OUTPT VISIT, EST, LEVL IV, 30-39 MIN: ICD-10-PCS | Mod: S$PBB,25,, | Performed by: INTERNAL MEDICINE

## 2023-10-23 RX ORDER — AMLODIPINE BESYLATE 5 MG/1
TABLET ORAL
COMMUNITY

## 2023-10-23 RX ORDER — MORPHINE SULFATE 30 MG/1
30 TABLET, FILM COATED, EXTENDED RELEASE ORAL EVERY 12 HOURS
COMMUNITY
Start: 2023-10-13

## 2023-10-23 RX ORDER — IRBESARTAN 150 MG/1
TABLET ORAL
COMMUNITY

## 2023-10-23 NOTE — PROGRESS NOTES
[facility]   Physical Therapy Treatment Note     Name: Garett Banegas  [facility] Number: [id number]    Therapy Diagnosis: Postural low back pain     Physician: Mara Levin, NP-C    Visit Date: 10/23/2023     Physician Orders: PT Eval and Treat Low back  Medical Diagnosis from Referral: Postural low back pain   Evaluation Date: 10/3/2023  Authorization Period Expiration: 9/25/2024  Plan of Care Expiration: 11/28/2023  Progress Note Due: As needed  Visit # / Visits authorized: 3 / 16   FOTO: 44 /100    PTA Visit #: 2/5    Time In: 10:45 am  Time Out: 11:16 pm   Total Billable Time: 31 minutes    Precautions: multiple bulging discs   Functional Level at time of Evaluation:  Patient is working in constant pain with more limitations.     Subjective     Pt reports: 7-8/10 pain in low back; was in hospital for 3 days with chest tube  He was compliant with home exercise program.    Pain: 7/10  Location: Low Back with Radiculopathy to Bilateral Knees      Objective       Kalpesh received therapeutic exercises to develop strength, endurance, ROM, flexibility, posture, and core stabilization for 23 minutes including:    Back Exercises : multiple rest breaks needed due to significant shortness of breath     Bike  5 Min    Calf Stretch  4 x 15 sec    Hamstring Stretch  4 x 15 sec    Cybex Back  2 x 10 with 3 plates    Cybex Abdominals     Cybex Leg Press -  Single      Cybex Hamstring Curls   2 x 10 with 4 plates    Cybex Hip - Abduction     Cybex Hip - Flexion     Cybex Hip - Extension     Bridges  2x10   LTR  5x10 sec (B)   Seated Trunk Flexion with SB  5x10 sec fwd                   Kalpesh received the following manual therapy techniques: Joint mobilizations, Manual traction, Myofacial release, and Soft tissue Mobilization were applied to the: Low Back for 8 minutes, including:  Long Axis Traction  Trunk Rotation (B)      Kalpesh participated in neuromuscular re-education activities to  improve: Coordination, Kinesthetic, Proprioception, and Posture for 0 minutes. The following activities were included:     Cybex rows wide and close  Cable rotation  Cable palloff press  Lumbosacral rotation with multifidus activation/strengthening      Kalpesh received the following supervised modalities after being cleared for contradictions: Mechanical Traction:  Garett received intermittent mechanical traction to the lumbar spine at a force of 75 pounds for a total of 15 minutes. Hold time of 60 seconds and rest time for 20 second. Patient tolerated treatment well without any adverse effects.            Home Exercises Provided and Patient Education Provided     Education provided: We reviewed the Home Exercise Program that he was given at time of Evaluation.     Written Home Exercises Provided: Patient instructed to cont prior HEP.  Exercises were reviewed and Kalpesh was able to demonstrate them prior to the end of the session.  Kalpesh demonstrated good  understanding of the education provided.     See EMR under Patient Instructions for exercises provided prior visit.    Assessment     Pt is doing well with fatigue and pain noted upon arrival due to being at work 5 days. Focused more on stretching and dialed back on strengthening today. Bilateral QL tightness and piriformis tightness. Pt has another appt tomorrow and will add in more strengthening exercises as tolerated. Will continue to progress as able.       PMH from Evaluation:   Garett is a 63 y.o. male referred to outpatient Physical Therapy with a medical diagnosis of Postural low back pain. Patient presents with Low back pain, abnormal posture, core weakness, decreased lumbar motion, decreased flexibility of hamstrings, hips and piriformis. Patient brought his MRI report from yesterday. MRI reveals the following: Disc bulging L2-3 with right neural canal narrowing, Disc bulging L3-4 with mild bilateral neural canal narrowing, Disc bulging or pseudo bulging  and osteophytic ridging related to degenerative anterolisthesis of L4-5 with bilateral neural canal narrowing. There is postoperative changes of previous fusion at this level and Disc bulging and bilateral neural canal narrowing L5-S1. Patient has positive sitting slump test and STRAIGHT LEG RAISE test bilateral. Patient will benefit from skilled Physical Therapy intervention to address all deficits and help patient to return to their prior level of function.         Kalpesh Is progressing well towards his goals.   Pt prognosis is Good.     Pt will continue to benefit from skilled outpatient physical therapy to address the deficits listed in the problem list box on initial evaluation, provide pt/family education and to maximize pt's level of independence in the home and community environment.     Pt's spiritual, cultural and educational needs considered and pt agreeable to plan of care and goals.     Anticipated barriers to physical therapy: None    Goals:  Short Term Goals: 4 weeks   1. Patient will be Independent with Home Exercise Program   2. Patient will demonstrate with improved Posture and Body Mechanics  3. Patient will increase Lumbosacral Range of Motion by 15%   4. Patient will increase Lower Extremity Strength to grossly 5/5  5. Patient will decrease complaints of pain with activity to Less than or Equal to 5/10      Long Term Goals: 8 weeks   1. Patient will tolerate 30 minutes of activity with complaints of Pain at Less Than or Equal to 3/10   2. Patient will increase Core Strength to grossly 4/5 or greater      Plan     Plan of care Certification: 10/3/2023 to 11/28/2023.     Outpatient Physical Therapy 2 times weekly for 8 weeks to include the following interventions: Lumbar Traction, Electrical Stimulation Pre-Mod, Manual Therapy, Moist Heat/ Ice, Neuromuscular Re-ed, Patient Education, Therapeutic Activities, and Therapeutic Exercise.     Stuart Torres, PTA  10/23/2023

## 2023-10-24 NOTE — ASSESSMENT & PLAN NOTE
Patient is status post bronchoscopy and needle biopsy there is no evidence of malignancy on the biopsy showed inflammation review CT lesion in the right upper lobe is markedly improved but still present and there is no evidence of a right middle lesion noted on chest x-ray repeat CT in 2 months to make sure this continues to improve he is symptomatically much better he hour for continues to smoke we talked about the need to quit that

## 2023-10-24 NOTE — PROCEDURES
Pulmonary function test  Forced vital capacity 3.07 L 67% predicted   FEV1 2.15 L 61% predicted  FEV1 ratio 70%   Small airways disease  Hyperinflation  Normal DLCO   Moderate obstructive ventilatory impairment

## 2023-10-24 NOTE — PROGRESS NOTES
Subjective:       Patient ID: Garett Banegas is a 63 y.o. male.    Chief Complaint: Lung Mass and Follow-up (Patient states that he has been coughing a lot of dark colored mucus.)    Follow-up  This is a chronic problem. The current episode started more than 1 month ago. The problem has been unchanged. Pertinent negatives include no abdominal pain, arthralgias, chest pain, chills, congestion, headaches or rash.     Past Medical History:   Diagnosis Date    Asthma     CAD (coronary artery disease)     Compressed cervical disc 07/2023    COPD (chronic obstructive pulmonary disease)     Hyperlipidemia     Hypertension     Low back pain     Tobacco dependence due to cigarettes      Past Surgical History:   Procedure Laterality Date    APPENDECTOMY      BACK SURGERY      x3    HAND SURGERY      ROTATOR CUFF REPAIR Right     SINUS SURGERY       Family History   Problem Relation Age of Onset    Cancer Other     Diabetes Other     Heart disease Other     Hypertension Other     Stroke Other     Epilepsy Other      Review of patient's allergies indicates:   Allergen Reactions    Penicillins Rash     Other reaction(s): Hives, Unknown      Social History     Tobacco Use    Smoking status: Every Day    Smokeless tobacco: Never   Substance Use Topics    Alcohol use: Yes    Drug use: Never      Review of Systems   Constitutional:  Negative for chills, activity change and night sweats.   HENT:  Negative for congestion and ear pain.    Eyes:  Negative for redness and itching.   Cardiovascular:  Negative for chest pain and palpitations.   Musculoskeletal:  Negative for arthralgias and back pain.   Skin:  Negative for rash.   Gastrointestinal:  Negative for abdominal pain and abdominal distention.   Neurological:  Negative for dizziness and headaches.   Hematological:  Negative for adenopathy. Does not bruise/bleed easily.   Psychiatric/Behavioral:  Negative for confusion. The patient is not nervous/anxious.        Objective:     "  Physical Exam   Constitutional: He is oriented to person, place, and time. He appears well-developed and well-nourished.   HENT:   Head: Normocephalic.   Nose: Nose normal.   Mouth/Throat: Oropharynx is clear and moist.   Neck: No JVD present. No thyromegaly present.   Cardiovascular: Normal rate, regular rhythm, normal heart sounds and intact distal pulses.   Pulmonary/Chest: Normal expansion, hyperinflation, symmetric chest wall expansion, effort normal and breath sounds normal.   Abdominal: Soft. Bowel sounds are normal.   Musculoskeletal:         General: Normal range of motion.      Cervical back: Normal range of motion and neck supple.   Lymphadenopathy: No supraclavicular adenopathy is present.     He has no cervical adenopathy.   Neurological: He is alert and oriented to person, place, and time. He has normal reflexes.   Skin: Skin is warm and dry.   Psychiatric: He has a normal mood and affect. His behavior is normal.     Personal Diagnostic Review  none pertinent        10/23/2023     2:01 PM 10/23/2023     1:30 PM 10/14/2023     1:51 PM 10/14/2023    12:18 PM 10/14/2023     7:19 AM 10/14/2023     7:17 AM 10/14/2023     6:34 AM   Pulmonary Function Tests   FVC  4.56 liters        FVC%  70        FEV1  3.5 liters        FEV1%  67        FEF 25-75  2.85        FEF 25-75%  63        TLC (liters)  7.02 liters        TLC%  87        RV  2.3        RV%  128        DLCO (ml/mmHg sec)  27.28 ml/mmHg sec        DLCO%  62        Peak Flow  543 L/min        FiO2 (%)  21 %        SpO2 97 % 97 % 97 % 99 % 99 % 99 % 99 %   Height 5' 11" (1.803 m)         Weight 59 kg (130 lb)         BMI (Calculated) 18.1               Assessment:       1. Pneumonia of right upper lobe due to infectious organism    2. Abscess of upper lobe of right lung with pneumonia    3. Tobacco dependence due to cigarettes    4. COPD, severity to be determined        Outpatient Encounter Medications as of 10/23/2023   Medication Sig Dispense " Refill    albuterol (PROVENTIL HFA) 90 mcg/actuation inhaler Inhale 2 puffs into the lungs every 6 (six) hours as needed for Wheezing. Rescue 18 g 11    amLODIPine (NORVASC) 5 MG tablet       aspirin (ECOTRIN) 81 MG EC tablet Take 81 mg by mouth once daily.      budesonide (PULMICORT FLEXHALER) 90 mcg/actuation AePB Inhale 2 puffs (180 mcg total) into the lungs 2 (two) times a day. Controller 1 each 2    carvediloL (COREG) 6.25 MG tablet Take 1 tablet by mouth once daily.      clopidogreL (PLAVIX) 75 mg tablet Take by mouth.      irbesartan (AVAPRO) 150 MG tablet       losartan (COZAAR) 50 MG tablet Take 50 mg by mouth once daily.      morphine (MS CONTIN) 30 MG 12 hr tablet Take 30 mg by mouth every 12 (twelve) hours.      rosuvastatin (CRESTOR) 20 MG tablet Take 1 tablet (20 mg total) by mouth once daily. 90 tablet 3    nicotine (NICODERM CQ) 21 mg/24 hr Place 1 patch onto the skin once daily. (Patient not taking: Reported on 10/23/2023) 30 patch 1    [DISCONTINUED] clindamycin (CLEOCIN) 300 MG capsule Take 1 capsule (300 mg total) by mouth every 6 (six) hours. (Patient not taking: Reported on 9/18/2023) 42 capsule 0    [DISCONTINUED] clopidogreL (PLAVIX) 75 mg tablet Take 75 mg by mouth once daily.      [DISCONTINUED] acetaminophen tablet 650 mg       [DISCONTINUED] albuterol-ipratropium 2.5 mg-0.5 mg/3 mL nebulizer solution 3 mL       [DISCONTINUED] aspirin EC tablet 81 mg       [DISCONTINUED] atorvastatin tablet 40 mg       [DISCONTINUED] budesonide nebulizer solution 0.25 mg       [DISCONTINUED] carvediloL tablet 6.25 mg       [DISCONTINUED] clopidogreL tablet 75 mg       [DISCONTINUED] dextrose 10% bolus 125 mL 125 mL       [DISCONTINUED] dextrose 10% bolus 250 mL 250 mL       [DISCONTINUED] glucagon (human recombinant) injection 1 mg       [DISCONTINUED] glucose chewable tablet 16 g       [DISCONTINUED] glucose chewable tablet 24 g       [DISCONTINUED] HYDROcodone-acetaminophen 7.5-325 mg per tablet 1 tablet        [DISCONTINUED] losartan tablet 50 mg       [DISCONTINUED] melatonin tablet 6 mg       [DISCONTINUED] naloxone 0.4 mg/mL injection 0.02 mg       [DISCONTINUED] ondansetron injection 4 mg       [DISCONTINUED] polyethylene glycol packet 17 g       [DISCONTINUED] sodium chloride 0.9% flush 10 mL        No facility-administered encounter medications on file as of 10/23/2023.     Orders Placed This Encounter   Procedures    X-Ray Chest PA And Lateral     Standing Status:   Future     Standing Expiration Date:   10/23/2024     Order Specific Question:   May the Radiologist modify the order per protocol to meet the clinical needs of the patient?     Answer:   Yes     Order Specific Question:   Release to patient     Answer:   Immediate       Plan:       Problem List Items Addressed This Visit          Pulmonary    Abscess of upper lobe of right lung with pneumonia     Patient is status post bronchoscopy and needle biopsy there is no evidence of malignancy on the biopsy showed inflammation review CT lesion in the right upper lobe is markedly improved but still present and there is no evidence of a right middle lesion noted on chest x-ray repeat CT in 2 months to make sure this continues to improve he is symptomatically much better he hour for continues to smoke we talked about the need to quit that         COPD, severity to be determined     Currently remains stable, will need to consider PFTs in the near future            Other    Tobacco dependence due to cigarettes     Discussed smoking cessation          Other Visit Diagnoses       Pneumonia of right upper lobe due to infectious organism    -  Primary    Relevant Orders    X-Ray Chest PA And Lateral

## 2023-10-30 ENCOUNTER — CLINICAL SUPPORT (OUTPATIENT)
Dept: REHABILITATION | Facility: HOSPITAL | Age: 63
End: 2023-10-30
Payer: OTHER MISCELLANEOUS

## 2023-10-30 DIAGNOSIS — M54.59 POSTURAL LOW BACK PAIN: Primary | ICD-10-CM

## 2023-10-30 LAB — MYCOBACTERIUM SPEC CULT: NORMAL

## 2023-10-30 PROCEDURE — 97012 MECHANICAL TRACTION THERAPY: CPT | Mod: CQ

## 2023-10-30 PROCEDURE — 97110 THERAPEUTIC EXERCISES: CPT | Mod: CQ

## 2023-10-30 PROCEDURE — 97140 MANUAL THERAPY 1/> REGIONS: CPT | Mod: CQ

## 2023-10-30 NOTE — PROGRESS NOTES
[facility]   Physical Therapy Treatment Note     Name: Garett Banegas  [facility] Number: [id number]    Therapy Diagnosis: Postural low back pain     Physician: Mara Levin, NP-C    Visit Date: 10/30/2023     Physician Orders: PT Eval and Treat Low back  Medical Diagnosis from Referral: Postural low back pain   Evaluation Date: 10/3/2023  Authorization Period Expiration: 9/25/2024  Plan of Care Expiration: 11/28/2023  Progress Note Due: As needed  Visit # / Visits authorized: 4 / 16   FOTO: 44 /100    PTA Visit #: 3/5    Time In: 9:48 am  Time Out: 10:48 pm   Total Billable Time: 60 minutes    Precautions: multiple bulging discs   Functional Level at time of Evaluation:  Patient is working in constant pain with more limitations.     Subjective     Pt reports: been using an inversion table with no relief in pain; 7/10 currently   He was compliant with home exercise program.    Pain: 7/10  Location: Low Back with Radiculopathy to Bilateral Knees      Objective       Kalpesh received therapeutic exercises to develop strength, endurance, ROM, flexibility, posture, and core stabilization for 30 minutes including:    Back Exercises : multiple rest breaks needed due to significant shortness of breath     Bike  5 Min    Calf Stretch  4 x 15 sec    Hamstring Stretch  4 x 15 sec    Cybex Back  2 x 10 with 3 plates    DKC with Belt  10x10 sec hold   Cybex Leg Press -  Bilateral   3 x 10 with 7 plates    Cybex Leg Press -  Single      Cybex Hamstring Curls   2 x 10 with 4 plates    Cybex Hip - Abduction     Cybex Hip - Flexion     Cybex Hip - Extension  3 plates 20x (B)   Bridges  2x10   LTR  5x10 sec (B)   Seated Trunk Flexion with SB  10x10 sec fwd                   Kalpesh received the following manual therapy techniques: Joint mobilizations, Manual traction, Myofacial release, and Soft tissue Mobilization were applied to the: Low Back for 10 minutes, including:  Long Axis Traction  Trunk  Rotation (B)      Kalpesh participated in neuromuscular re-education activities to improve: Coordination, Kinesthetic, Proprioception, and Posture for 0 minutes. The following activities were included:     Cybex rows wide and close  Cable rotation  Cable palloff press  Lumbosacral rotation with multifidus activation/strengthening      Kalpesh received the following supervised modalities after being cleared for contradictions: Mechanical Traction:  Garett received intermittent mechanical traction to the lumbar spine at a force of 85 pounds for a total of 15 minutes. Hold time of 60 seconds and rest time for 20 second. Patient tolerated treatment well without any adverse effects.            Home Exercises Provided and Patient Education Provided     Education provided: We reviewed the Home Exercise Program that he was given at time of Evaluation.     Written Home Exercises Provided: Patient instructed to cont prior HEP.  Exercises were reviewed and Kalpesh was able to demonstrate them prior to the end of the session.  Kalpesh demonstrated good  understanding of the education provided.     See EMR under Patient Instructions for exercises provided prior visit.    Assessment     Pt has moderate pain in low back today. Able to progress LOWER EXTREMITY and core strengthening with fatigue noted. Mod QL tightness bilaterally. No increased pain with strengthening exercises. Ended session with mechanical traction with increased pull weight with no adverse effects afterwards. Will continue to progress as able       PMH from Evaluation:   Garett is a 63 y.o. male referred to outpatient Physical Therapy with a medical diagnosis of Postural low back pain. Patient presents with Low back pain, abnormal posture, core weakness, decreased lumbar motion, decreased flexibility of hamstrings, hips and piriformis. Patient brought his MRI report from yesterday. MRI reveals the following: Disc bulging L2-3 with right neural canal narrowing, Disc  bulging L3-4 with mild bilateral neural canal narrowing, Disc bulging or pseudo bulging and osteophytic ridging related to degenerative anterolisthesis of L4-5 with bilateral neural canal narrowing. There is postoperative changes of previous fusion at this level and Disc bulging and bilateral neural canal narrowing L5-S1. Patient has positive sitting slump test and STRAIGHT LEG RAISE test bilateral. Patient will benefit from skilled Physical Therapy intervention to address all deficits and help patient to return to their prior level of function.         Kalpesh Is progressing well towards his goals.   Pt prognosis is Good.     Pt will continue to benefit from skilled outpatient physical therapy to address the deficits listed in the problem list box on initial evaluation, provide pt/family education and to maximize pt's level of independence in the home and community environment.     Pt's spiritual, cultural and educational needs considered and pt agreeable to plan of care and goals.     Anticipated barriers to physical therapy: None    Goals:  Short Term Goals: 4 weeks   1. Patient will be Independent with Home Exercise Program   2. Patient will demonstrate with improved Posture and Body Mechanics  3. Patient will increase Lumbosacral Range of Motion by 15%   4. Patient will increase Lower Extremity Strength to grossly 5/5  5. Patient will decrease complaints of pain with activity to Less than or Equal to 5/10      Long Term Goals: 8 weeks   1. Patient will tolerate 30 minutes of activity with complaints of Pain at Less Than or Equal to 3/10   2. Patient will increase Core Strength to grossly 4/5 or greater      Plan     Plan of care Certification: 10/3/2023 to 11/28/2023.     Outpatient Physical Therapy 2 times weekly for 8 weeks to include the following interventions: Lumbar Traction, Electrical Stimulation Pre-Mod, Manual Therapy, Moist Heat/ Ice, Neuromuscular Re-ed, Patient Education, Therapeutic Activities, and  Therapeutic Exercise.     Stuart Torres, PTA  10/30/2023

## 2023-10-31 ENCOUNTER — CLINICAL SUPPORT (OUTPATIENT)
Dept: REHABILITATION | Facility: HOSPITAL | Age: 63
End: 2023-10-31
Payer: OTHER MISCELLANEOUS

## 2023-10-31 DIAGNOSIS — M54.59 POSTURAL LOW BACK PAIN: Primary | ICD-10-CM

## 2023-10-31 PROCEDURE — 97110 THERAPEUTIC EXERCISES: CPT | Mod: CQ

## 2023-10-31 PROCEDURE — 97140 MANUAL THERAPY 1/> REGIONS: CPT | Mod: CQ

## 2023-10-31 NOTE — PROGRESS NOTES
[facility]   Physical Therapy Treatment Note     Name: Garett Banegas  [facility] Number: [id number]    Therapy Diagnosis: Postural low back pain     Physician: Mara Levin, NP-C    Visit Date: 10/31/2023     Physician Orders: PT Eval and Treat Low back  Medical Diagnosis from Referral: Postural low back pain   Evaluation Date: 10/3/2023  Authorization Period Expiration: 9/25/2024  Plan of Care Expiration: 11/28/2023  Progress Note Due: As needed  Visit # / Visits authorized: 5 / 16   FOTO: 44 /100    PTA Visit #: 4/5    Time In: 7:45 am  Time Out: 8:24 pm   Total Billable Time: 39 minutes    Precautions: multiple bulging discs   Functional Level at time of Evaluation:  Patient is working in constant pain with more limitations.     Subjective     Pt reports: doing fair today; 4-5/10 pain  He was compliant with home exercise program.    Pain: 7/10  Location: Low Back with Radiculopathy to Bilateral Knees      Objective       Kalpesh received therapeutic exercises to develop strength, endurance, ROM, flexibility, posture, and core stabilization for 30 minutes including:    Back Exercises : multiple rest breaks needed due to significant shortness of breath     Bike  5 Min    Calf Stretch  4 x 15 sec    Hamstring Stretch  4 x 15 sec    Cybex Back  2 x 10 with 3 plates    DKC with Belt  10x10 sec hold   Cybex Leg Press -  Bilateral   3 x 10 with 7 plates    Cybex Leg Press -  Single      Cybex Hamstring Curls   2 x 10 with 4 plates    Cybex Hip - Abduction     Cybex Hip - Flexion     Cybex Hip - Extension  3 plates 20x (B)   Bridges  2x10   LTR  5x10 sec (B)   Seated Trunk Flexion with SB  5x10 sec fwd                   Kalpesh received the following manual therapy techniques: Joint mobilizations, Manual traction, Myofacial release, and Soft tissue Mobilization were applied to the: Low Back for 9 minutes, including:  Long Axis Traction  Trunk Rotation (B)      Kalpesh participated in  neuromuscular re-education activities to improve: Coordination, Kinesthetic, Proprioception, and Posture for 0 minutes. The following activities were included:     Cybex rows wide and close  Cable rotation  Cable palloff press  Lumbosacral rotation with multifidus activation/strengthening      Kalpesh received the following supervised modalities after being cleared for contradictions: Mechanical Traction:  Garett received intermittent mechanical traction to the lumbar spine at a force of 85 pounds for a total of 0 minutes. Hold time of 60 seconds and rest time for 20 second. Patient tolerated treatment well without any adverse effects.            Home Exercises Provided and Patient Education Provided     Education provided: We reviewed the Home Exercise Program that he was given at time of Evaluation.     Written Home Exercises Provided: Patient instructed to cont prior HEP.  Exercises were reviewed and Kalpesh was able to demonstrate them prior to the end of the session.  Kalpesh demonstrated good  understanding of the education provided.     See EMR under Patient Instructions for exercises provided prior visit.    Assessment     Pt doing well with decreased pain today. Able to progress strengthening exercises with fatigue noted. Deferred traction at end of session today. Will continue to progress as able. Educated pt to keep up with his HEP.       PMH from Evaluation:   Garett is a 63 y.o. male referred to outpatient Physical Therapy with a medical diagnosis of Postural low back pain. Patient presents with Low back pain, abnormal posture, core weakness, decreased lumbar motion, decreased flexibility of hamstrings, hips and piriformis. Patient brought his MRI report from yesterday. MRI reveals the following: Disc bulging L2-3 with right neural canal narrowing, Disc bulging L3-4 with mild bilateral neural canal narrowing, Disc bulging or pseudo bulging and osteophytic ridging related to degenerative anterolisthesis of  L4-5 with bilateral neural canal narrowing. There is postoperative changes of previous fusion at this level and Disc bulging and bilateral neural canal narrowing L5-S1. Patient has positive sitting slump test and STRAIGHT LEG RAISE test bilateral. Patient will benefit from skilled Physical Therapy intervention to address all deficits and help patient to return to their prior level of function.         Kalpesh Is progressing well towards his goals.   Pt prognosis is Good.     Pt will continue to benefit from skilled outpatient physical therapy to address the deficits listed in the problem list box on initial evaluation, provide pt/family education and to maximize pt's level of independence in the home and community environment.     Pt's spiritual, cultural and educational needs considered and pt agreeable to plan of care and goals.     Anticipated barriers to physical therapy: None    Goals:  Short Term Goals: 4 weeks   1. Patient will be Independent with Home Exercise Program   2. Patient will demonstrate with improved Posture and Body Mechanics  3. Patient will increase Lumbosacral Range of Motion by 15%   4. Patient will increase Lower Extremity Strength to grossly 5/5  5. Patient will decrease complaints of pain with activity to Less than or Equal to 5/10      Long Term Goals: 8 weeks   1. Patient will tolerate 30 minutes of activity with complaints of Pain at Less Than or Equal to 3/10   2. Patient will increase Core Strength to grossly 4/5 or greater      Plan     Plan of care Certification: 10/3/2023 to 11/28/2023.     Outpatient Physical Therapy 2 times weekly for 8 weeks to include the following interventions: Lumbar Traction, Electrical Stimulation Pre-Mod, Manual Therapy, Moist Heat/ Ice, Neuromuscular Re-ed, Patient Education, Therapeutic Activities, and Therapeutic Exercise.     Stuart Torres, PTA  10/31/2023

## 2023-11-06 ENCOUNTER — CLINICAL SUPPORT (OUTPATIENT)
Dept: REHABILITATION | Facility: HOSPITAL | Age: 63
End: 2023-11-06
Payer: OTHER MISCELLANEOUS

## 2023-11-06 DIAGNOSIS — M54.59 POSTURAL LOW BACK PAIN: Primary | ICD-10-CM

## 2023-11-06 PROCEDURE — 97112 NEUROMUSCULAR REEDUCATION: CPT

## 2023-11-06 PROCEDURE — 97110 THERAPEUTIC EXERCISES: CPT

## 2023-11-06 NOTE — PROGRESS NOTES
[facility]   Physical Therapy Treatment Note     Name: Garett Banegas  [facility] Number: [id number]    Therapy Diagnosis: Postural low back pain     Physician: Mara Levin, NP-C    Visit Date: 11/6/2023     Physician Orders: PT Eval and Treat Low back  Medical Diagnosis from Referral: Postural low back pain   Evaluation Date: 10/3/2023  Authorization Period Expiration: 9/25/2024  Plan of Care Expiration: 11/28/2023  Progress Note Due: As needed  Visit # / Visits authorized: 7 / 16   FOTO: 44 /100    PTA Visit #: 4/5    Time In: 9:30 am  Time Out: 10:40 am   Total Billable Time: 55 minutes    Precautions: multiple bulging discs   Functional Level at time of Evaluation:  Patient is working in constant pain with more limitations.     Subjective     Pt reports: Increased pain due to working 5 days straight.   He was compliant with home exercise program.    Pain: 8/10  Location: Low Back with Radiculopathy to Bilateral Knees      Objective       Kalpesh received therapeutic exercises to develop strength, endurance, ROM, flexibility, posture, and core stabilization for 32 minutes including:    Back Exercises : multiple rest breaks needed due to significant shortness of breath     Bike/Nustep  5 Min Nustep   Calf Stretch  4 x 15 sec    Hamstring Stretch  4 x 15 sec    Cybex Back  3 x 10 with 3 plates    DKC with Belt  10x10 sec hold   Cybex Leg Press -  Bilateral   3 x 10 with 7 plates    Cybex Leg Press -  Single      Cybex Hamstring Curls   3 x 10 with 4 plates    Cybex Hip - Abduction     Cybex Hip - Flexion     Cybex Hip - Extension  3 plates 20x (B)   Bridges  2 x 10   SB isometric 2 x 10 5 second hold   Seated Trunk Flexion with SB  5x10 sec fwd                   Kalpesh received the following manual therapy techniques: Joint mobilizations, Manual traction, Myofacial release, and Soft tissue Mobilization were applied to the: Low Back for 0 minutes, including:  Long Axis  Traction  Trunk Rotation (B)      Kalpesh participated in neuromuscular re-education activities to improve: Coordination, Kinesthetic, Proprioception, and Posture for 23 minutes. The following activities were included:     Cybex rows wide and close 2 x 10 4 plates  Cable rotation 2 x 10 10#  Cable palloff press 2 x 10 10#  Lumbosacral rotation with multifidus activation/strengthening x 20      Kalpesh received the following supervised modalities after being cleared for contradictions: Mechanical Traction:  Garett received intermittent mechanical traction to the lumbar spine at a force of 85 pounds for a total of 0 minutes. Hold time of 60 seconds and rest time for 20 second. Patient tolerated treatment well without any adverse effects.            Home Exercises Provided and Patient Education Provided     Education provided: We reviewed the Home Exercise Program that he was given at time of Evaluation.     Written Home Exercises Provided: Patient instructed to cont prior HEP.  Exercises were reviewed and Kalpesh was able to demonstrate them prior to the end of the session.  Kalpesh demonstrated good  understanding of the education provided.     See EMR under Patient Instructions for exercises provided prior visit.    Assessment     Patient arrived with increased pain today due to working 5 days 120 hours. Patient continues to have difficulty breathing resulting in poor functional endurance. Appropriate rest breaks provided. Advanced core strengthening today. Patient continues to have poor lumbosacral rotation but improved multifidus strength. Will continue to advance patient toward achievement of goals.       -Sup Visit performed today with BRENNA Chun and BRENNA Esposito.  All goals and treatment plan reviewed. Will work toward completion of all goals set.     PMH from Evaluation:   Garett is a 63 y.o. male referred to outpatient Physical Therapy with a medical diagnosis of Postural low back pain. Patient  presents with Low back pain, abnormal posture, core weakness, decreased lumbar motion, decreased flexibility of hamstrings, hips and piriformis. Patient brought his MRI report from yesterday. MRI reveals the following: Disc bulging L2-3 with right neural canal narrowing, Disc bulging L3-4 with mild bilateral neural canal narrowing, Disc bulging or pseudo bulging and osteophytic ridging related to degenerative anterolisthesis of L4-5 with bilateral neural canal narrowing. There is postoperative changes of previous fusion at this level and Disc bulging and bilateral neural canal narrowing L5-S1. Patient has positive sitting slump test and STRAIGHT LEG RAISE test bilateral. Patient will benefit from skilled Physical Therapy intervention to address all deficits and help patient to return to their prior level of function.         Kalpesh Is progressing well towards his goals.   Pt prognosis is Good.     Pt will continue to benefit from skilled outpatient physical therapy to address the deficits listed in the problem list box on initial evaluation, provide pt/family education and to maximize pt's level of independence in the home and community environment.     Pt's spiritual, cultural and educational needs considered and pt agreeable to plan of care and goals.     Anticipated barriers to physical therapy: None    Goals:  Short Term Goals: 4 weeks   1. Patient will be Independent with Home Exercise Program : Met  2. Patient will demonstrate with improved Posture and Body Mechanics : Met  3. Patient will increase Lumbosacral Range of Motion by 15% :   4. Patient will increase Lower Extremity Strength to grossly 5/5 :  5. Patient will decrease complaints of pain with activity to Less than or Equal to 5/10 :     Long Term Goals: 8 weeks   1. Patient will tolerate 30 minutes of activity with complaints of Pain at Less Than or Equal to 3/10 :  2. Patient will increase Core Strength to grossly 4/5 or greater :      Plan     Plan  of care Certification: 10/3/2023 to 11/28/2023.     Outpatient Physical Therapy 2 times weekly for 8 weeks to include the following interventions: Lumbar Traction, Electrical Stimulation Pre-Mod, Manual Therapy, Moist Heat/ Ice, Neuromuscular Re-ed, Patient Education, Therapeutic Activities, and Therapeutic Exercise.     CHARISSA GARCIA, PT, MLT    11/6/2023

## 2023-11-13 ENCOUNTER — CLINICAL SUPPORT (OUTPATIENT)
Dept: REHABILITATION | Facility: HOSPITAL | Age: 63
End: 2023-11-13
Payer: OTHER MISCELLANEOUS

## 2023-11-13 DIAGNOSIS — M54.59 POSTURAL LOW BACK PAIN: Primary | ICD-10-CM

## 2023-11-13 PROCEDURE — 97110 THERAPEUTIC EXERCISES: CPT | Mod: CQ

## 2023-11-13 PROCEDURE — 97012 MECHANICAL TRACTION THERAPY: CPT | Mod: CQ

## 2023-11-13 PROCEDURE — 97140 MANUAL THERAPY 1/> REGIONS: CPT | Mod: CQ

## 2023-11-13 NOTE — PROGRESS NOTES
[facility]   Physical Therapy Treatment Note     Name: Garett Banegas  [facility] Number: [id number]    Therapy Diagnosis: Postural low back pain     Physician: Mara Levin, NP-C    Visit Date: 2023     Physician Orders: PT Eval and Treat Low back  Medical Diagnosis from Referral: Postural low back pain   Evaluation Date: 10/3/2023  Authorization Period Expiration: 2024  Plan of Care Expiration: 2023  Progress Note Due: As needed  Visit # / Visits authorized:    FOTO: 44 /100    PTA Visit #:     Time In: 1:03 pm  Time Out: 2:11 pm   Total Billable Time: 68 minutes    Precautions: multiple bulging discs   Functional Level at time of Evaluation:  Patient is working in constant pain with more limitations.     Subjective     Pt reports: Increased pain today; been at the  home most of the weekend  He was compliant with home exercise program.    Pain: 8/10  Location: Low Back with Radiculopathy to Bilateral Knees      Objective       Kalpesh received therapeutic exercises to develop strength, endurance, ROM, flexibility, posture, and core stabilization for 23 minutes including:    Back Exercises : multiple rest breaks needed due to significant shortness of breath     Bike/Nustep  5 Min Bike   Calf Stretch  4 x 15 sec    Hamstring Stretch  4 x 15 sec    Cybex Back  3 x 10 with 3 plates    DKC with Belt  10x10 sec hold   Cybex Leg Press -  Bilateral   2 x 12 with 9 plates    Cybex Leg Press -  Single      Cybex Hip - Abduction     Cybex Hip - Flexion     Cybex Hip - Extension  3 plates 20x (B)   Bridges  2 x 10   Seated Trunk Flexion with SB  10x10 sec fwd               Kalpesh received the following manual therapy techniques: Joint mobilizations, Manual traction, Myofacial release, and Soft tissue Mobilization were applied to the: Low Back for 15 minutes, including:  Long Axis Traction  Trunk Rotation (B)  Piriformis (B)      Kalpesh participated in  neuromuscular re-education activities to improve: Coordination, Kinesthetic, Proprioception, and Posture for 0 minutes. The following activities were included:     Cybex rows wide and close 2 x 10 4 plates  Cable rotation 2 x 10 10#  Cable palloff press 2 x 10 10#  Lumbosacral rotation with multifidus activation/strengthening x 20      Kalpesh received the following supervised modalities after being cleared for contradictions: Mechanical Traction:  Garett received intermittent mechanical traction to the lumbar spine at a force of 85 pounds for a total of 15 minutes. Hold time of 60 seconds and rest time for 20 second. Patient tolerated treatment well without any adverse effects.            Home Exercises Provided and Patient Education Provided     Education provided: We reviewed the Home Exercise Program that he was given at time of Evaluation.     Written Home Exercises Provided: Patient instructed to cont prior HEP.  Exercises were reviewed and Kalpesh was able to demonstrate them prior to the end of the session.  Kalpesh demonstrated good  understanding of the education provided.     See EMR under Patient Instructions for exercises provided prior visit.    Assessment     Pt is pretty stiff and sore today due to being at the  home all weekend. Focused more on mobility/stretching today secondary to increased pain. Some increased pain noted with double leg press with increased weight. Ended session with mechanical traction and pt gets significant relief from this. Will continue to progress as able. Educated pt to keep up with HEP diligently.       PMH from Evaluation:   Garett is a 63 y.o. male referred to outpatient Physical Therapy with a medical diagnosis of Postural low back pain. Patient presents with Low back pain, abnormal posture, core weakness, decreased lumbar motion, decreased flexibility of hamstrings, hips and piriformis. Patient brought his MRI report from yesterday. MRI reveals the following: Disc  bulging L2-3 with right neural canal narrowing, Disc bulging L3-4 with mild bilateral neural canal narrowing, Disc bulging or pseudo bulging and osteophytic ridging related to degenerative anterolisthesis of L4-5 with bilateral neural canal narrowing. There is postoperative changes of previous fusion at this level and Disc bulging and bilateral neural canal narrowing L5-S1. Patient has positive sitting slump test and STRAIGHT LEG RAISE test bilateral. Patient will benefit from skilled Physical Therapy intervention to address all deficits and help patient to return to their prior level of function.         Kalpesh Is progressing well towards his goals.   Pt prognosis is Good.     Pt will continue to benefit from skilled outpatient physical therapy to address the deficits listed in the problem list box on initial evaluation, provide pt/family education and to maximize pt's level of independence in the home and community environment.     Pt's spiritual, cultural and educational needs considered and pt agreeable to plan of care and goals.     Anticipated barriers to physical therapy: None    Goals:  Short Term Goals: 4 weeks   1. Patient will be Independent with Home Exercise Program : Met  2. Patient will demonstrate with improved Posture and Body Mechanics : Met  3. Patient will increase Lumbosacral Range of Motion by 15% :   4. Patient will increase Lower Extremity Strength to grossly 5/5 :  5. Patient will decrease complaints of pain with activity to Less than or Equal to 5/10 :     Long Term Goals: 8 weeks   1. Patient will tolerate 30 minutes of activity with complaints of Pain at Less Than or Equal to 3/10 :  2. Patient will increase Core Strength to grossly 4/5 or greater :      Plan     Plan of care Certification: 10/3/2023 to 11/28/2023.     Outpatient Physical Therapy 2 times weekly for 8 weeks to include the following interventions: Lumbar Traction, Electrical Stimulation Pre-Mod, Manual Therapy, Moist Heat/  Ice, Neuromuscular Re-ed, Patient Education, Therapeutic Activities, and Therapeutic Exercise.     Stuart Torres, PTA    11/13/2023

## 2023-11-20 ENCOUNTER — CLINICAL SUPPORT (OUTPATIENT)
Dept: REHABILITATION | Facility: HOSPITAL | Age: 63
End: 2023-11-20
Payer: OTHER MISCELLANEOUS

## 2023-11-20 DIAGNOSIS — M54.59 POSTURAL LOW BACK PAIN: Primary | ICD-10-CM

## 2023-11-20 PROCEDURE — 97012 MECHANICAL TRACTION THERAPY: CPT

## 2023-11-20 PROCEDURE — 97110 THERAPEUTIC EXERCISES: CPT

## 2023-11-20 NOTE — PROGRESS NOTES
[facility]   Physical Therapy Treatment Note     Name: Garett Banegas  [facility] Number: [id number]    Therapy Diagnosis: Postural low back pain     Physician: Mara Levin, NP-C    Visit Date: 11/20/2023     Physician Orders: PT Eval and Treat Low back  Medical Diagnosis from Referral: Postural low back pain   Evaluation Date: 10/3/2023  Authorization Period Expiration: 9/25/2024  Plan of Care Expiration: 11/28/2023  Progress Note Due: As needed  Visit # / Visits authorized: 9 / 16   FOTO: 44 /100    PTA Visit #: 1/5    Time In: 9:55 am  Time Out:10:35 am   Total Billable Time: 40 minutes    Precautions: multiple bulging discs   Functional Level at time of Evaluation:  Patient is working in constant pain with more limitations.     Subjective     Pt reports: feeling bad due to breathing and back pain today   He was compliant with home exercise program.    Pain: 8/10  Location: Low Back with Radiculopathy to Bilateral Knees      Objective       Kalpesh received therapeutic exercises to develop strength, endurance, ROM, flexibility, posture, and core stabilization for 25 minutes including:    Back Exercises : multiple rest breaks needed due to significant shortness of breath     Bike/Nustep  5 Min Nustep   Calf Stretch  4 x 15 sec    Hamstring Stretch  4 x 15 sec    Cybex Back  3 x 10 with 3 plates    DKC with Belt  10x10 sec hold   Cybex Leg Press -  Bilateral   3 x 10 with 8 plates    Cybex Leg Press -  Single      Cybex Hip - Abduction     Cybex Hip - Flexion     Cybex Hip - Extension  3 plates 20x (B)   Seated Trunk Flexion with SB  10x10 sec fwd               Kalpesh received the following manual therapy techniques: Joint mobilizations, Manual traction, Myofacial release, and Soft tissue Mobilization were applied to the: Low Back for 15 minutes, including:  Long Axis Traction  Trunk Rotation (B)  Piriformis (B)      Kalpesh participated in neuromuscular re-education activities to  improve: Coordination, Kinesthetic, Proprioception, and Posture for 0 minutes. The following activities were included:     Cybex rows wide and close 2 x 10 4 plates  Cable rotation 2 x 10 10#  Cable palloff press 2 x 10 10#  Lumbosacral rotation with multifidus activation/strengthening x 20      Kalpesh received the following supervised modalities after being cleared for contradictions: Mechanical Traction:  Garett received intermittent mechanical traction to the lumbar spine at a force of 85 pounds for a total of 15 minutes. Hold time of 60 seconds and rest time for 20 second. Patient tolerated treatment well without any adverse effects.            Home Exercises Provided and Patient Education Provided     Education provided: We reviewed the Home Exercise Program that he was given at time of Evaluation.     Written Home Exercises Provided: Patient instructed to cont prior HEP.  Exercises were reviewed and Kalpesh was able to demonstrate them prior to the end of the session.  Kalpesh demonstrated good  understanding of the education provided.     See EMR under Patient Instructions for exercises provided prior visit.    Assessment     Patient continues to have significant low back pain and SOB with cough. Patient gets the most relief from lumbar traction. Continue to perform core strengthening and improvement of flexibility as tolerated. Will continue with PLAN OF CARE.       PMH from Evaluation:   Garett is a 63 y.o. male referred to outpatient Physical Therapy with a medical diagnosis of Postural low back pain. Patient presents with Low back pain, abnormal posture, core weakness, decreased lumbar motion, decreased flexibility of hamstrings, hips and piriformis. Patient brought his MRI report from yesterday. 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 There is postoperative changes of previous fusion at this level and Disc bulging and bilateral neural canal narrowing L5-S1. Patient has positive sitting slump test and STRAIGHT LEG RAISE test bilateral. Patient will benefit from skilled Physical Therapy intervention to address all deficits and help patient to return to their prior level of function.         Kalpesh Is progressing well towards his goals.   Pt prognosis is Good.     Pt will continue to benefit from skilled outpatient physical therapy to address the deficits listed in the problem list box on initial evaluation, provide pt/family education and to maximize pt's level of independence in the home and community environment.     Pt's spiritual, cultural and educational needs considered and pt agreeable to plan of care and goals.     Anticipated barriers to physical therapy: None    Goals:  Short Term Goals: 4 weeks   1. Patient will be Independent with Home Exercise Program : Met  2. Patient will demonstrate with improved Posture and Body Mechanics : Met  3. Patient will increase Lumbosacral Range of Motion by 15% :   4. Patient will increase Lower Extremity Strength to grossly 5/5 :  5. Patient will decrease complaints of pain with activity to Less than or Equal to 5/10 :     Long Term Goals: 8 weeks   1. Patient will tolerate 30 minutes of activity with complaints of Pain at Less Than or Equal to 3/10 :  2. Patient will increase Core Strength to grossly 4/5 or greater :      Plan     Plan of care Certification: 10/3/2023 to 11/28/2023.     Outpatient Physical Therapy 2 times weekly for 8 weeks to include the following interventions: Lumbar Traction, Electrical Stimulation Pre-Mod, Manual Therapy, Moist Heat/ Ice, Neuromuscular Re-ed, Patient Education, Therapeutic Activities, and Therapeutic Exercise.     CHARISSA GARCIA, PT, MLT    11/20/2023

## 2023-11-26 ENCOUNTER — OFFICE VISIT (OUTPATIENT)
Dept: FAMILY MEDICINE | Facility: CLINIC | Age: 63
End: 2023-11-26
Payer: COMMERCIAL

## 2023-11-26 VITALS
SYSTOLIC BLOOD PRESSURE: 167 MMHG | RESPIRATION RATE: 21 BRPM | DIASTOLIC BLOOD PRESSURE: 87 MMHG | TEMPERATURE: 99 F | HEART RATE: 77 BPM | OXYGEN SATURATION: 100 %

## 2023-11-26 DIAGNOSIS — J40 BRONCHITIS: Primary | ICD-10-CM

## 2023-11-26 PROCEDURE — 96372 THER/PROPH/DIAG INJ SC/IM: CPT | Mod: ,,, | Performed by: FAMILY MEDICINE

## 2023-11-26 PROCEDURE — 99214 OFFICE O/P EST MOD 30 MIN: CPT | Mod: 25,,, | Performed by: FAMILY MEDICINE

## 2023-11-26 PROCEDURE — 96372 PR INJECTION,THERAP/PROPH/DIAG2ST, IM OR SUBCUT: ICD-10-PCS | Mod: ,,, | Performed by: FAMILY MEDICINE

## 2023-11-26 PROCEDURE — 99214 PR OFFICE/OUTPT VISIT, EST, LEVL IV, 30-39 MIN: ICD-10-PCS | Mod: 25,,, | Performed by: FAMILY MEDICINE

## 2023-11-26 PROCEDURE — 99051 PR MEDICAL SERVICES, EVE/WKEND/HOLIDAY: ICD-10-PCS | Mod: ,,, | Performed by: FAMILY MEDICINE

## 2023-11-26 PROCEDURE — 99051 MED SERV EVE/WKEND/HOLIDAY: CPT | Mod: ,,, | Performed by: FAMILY MEDICINE

## 2023-11-26 RX ORDER — PREDNISONE 20 MG/1
40 TABLET ORAL DAILY
Qty: 10 TABLET | Refills: 0 | Status: SHIPPED | OUTPATIENT
Start: 2023-11-26 | End: 2023-12-01

## 2023-11-26 RX ORDER — LEVOFLOXACIN 500 MG/1
500 TABLET, FILM COATED ORAL DAILY
Qty: 7 TABLET | Refills: 0 | Status: SHIPPED | OUTPATIENT
Start: 2023-11-26 | End: 2023-12-03

## 2023-11-26 RX ORDER — CEFTRIAXONE 1 G/1
1 INJECTION, POWDER, FOR SOLUTION INTRAMUSCULAR; INTRAVENOUS
Status: COMPLETED | OUTPATIENT
Start: 2023-11-26 | End: 2023-11-26

## 2023-11-26 RX ORDER — BENZONATATE 100 MG/1
100 CAPSULE ORAL 3 TIMES DAILY PRN
Qty: 20 CAPSULE | Refills: 0 | Status: SHIPPED | OUTPATIENT
Start: 2023-11-26

## 2023-11-26 RX ORDER — DEXAMETHASONE SODIUM PHOSPHATE 4 MG/ML
6 INJECTION, SOLUTION INTRA-ARTICULAR; INTRALESIONAL; INTRAMUSCULAR; INTRAVENOUS; SOFT TISSUE
Status: COMPLETED | OUTPATIENT
Start: 2023-11-26 | End: 2023-11-26

## 2023-11-26 RX ADMIN — CEFTRIAXONE 1 G: 1 INJECTION, POWDER, FOR SOLUTION INTRAMUSCULAR; INTRAVENOUS at 03:11

## 2023-11-26 RX ADMIN — DEXAMETHASONE SODIUM PHOSPHATE 6 MG: 4 INJECTION, SOLUTION INTRA-ARTICULAR; INTRALESIONAL; INTRAMUSCULAR; INTRAVENOUS; SOFT TISSUE at 03:11

## 2023-11-26 NOTE — PROGRESS NOTES
Subjective:       Patient ID: Garett Banegas is a 63 y.o. male.    Chief Complaint: Cough (C/o chronic copd. Coughing up bloody sputum. Symptoms x several weeks. Sees Dr. Lang, Pulmonologist) and Shortness of Breath    Cough  Associated symptoms include rhinorrhea and shortness of breath. Pertinent negatives include no chest pain, chills, ear pain, eye redness, fever, headaches, myalgias, postnasal drip, rash, sore throat or wheezing. There is no history of environmental allergies.   Shortness of Breath  Associated symptoms include rhinorrhea. Pertinent negatives include no abdominal pain, chest pain, ear pain, fever, headaches, leg pain, leg swelling, neck pain, rash, sore throat, vomiting or wheezing.     Review of Systems   Constitutional:  Negative for activity change, appetite change, chills, diaphoresis, fatigue, fever and unexpected weight change.   HENT:  Positive for nasal congestion, rhinorrhea and sinus pressure/congestion. Negative for dental problem, drooling, ear discharge, ear pain, facial swelling, hearing loss, mouth sores, nosebleeds, postnasal drip, sneezing, sore throat, tinnitus, trouble swallowing, voice change and goiter.    Eyes:  Negative for photophobia, pain, discharge, redness, itching and visual disturbance.   Respiratory:  Positive for cough and shortness of breath. Negative for apnea, choking, chest tightness, wheezing and stridor.    Cardiovascular:  Negative for chest pain, palpitations, leg swelling and claudication.   Gastrointestinal:  Negative for abdominal distention, abdominal pain, anal bleeding, blood in stool, change in bowel habit, constipation, diarrhea, nausea, vomiting, reflux and fecal incontinence.   Endocrine: Negative for cold intolerance, heat intolerance, polydipsia, polyphagia and polyuria.   Genitourinary:  Negative for bladder incontinence, decreased urine volume, difficulty urinating, discharge, dysuria, enuresis, erectile dysfunction, flank pain,  frequency, genital sores, hematuria, penile pain, testicular pain and urgency.   Musculoskeletal:  Negative for arthralgias, back pain, gait problem, joint swelling, leg pain, myalgias, neck pain, neck stiffness and joint deformity.   Integumentary:  Negative for pallor, rash, wound and mole/lesion.   Allergic/Immunologic: Negative for environmental allergies, food allergies and frequent infections.   Neurological:  Negative for dizziness, vertigo, tremors, seizures, syncope, facial asymmetry, speech difficulty, weakness, light-headedness, numbness, headaches, memory loss and coordination difficulties.   Hematological:  Negative for adenopathy. Does not bruise/bleed easily.   Psychiatric/Behavioral:  Negative for agitation, behavioral problems, confusion, decreased concentration, dysphoric mood, hallucinations, self-injury, sleep disturbance and suicidal ideas. The patient is not nervous/anxious and is not hyperactive.          Objective:      Physical Exam  Vitals reviewed.   Constitutional:       Appearance: Normal appearance. He is normal weight.   HENT:      Head: Normocephalic and atraumatic.      Right Ear: Tympanic membrane and ear canal normal.      Left Ear: Tympanic membrane, ear canal and external ear normal.      Nose: Congestion and rhinorrhea present.      Mouth/Throat:      Mouth: Mucous membranes are moist.      Pharynx: Oropharynx is clear. Posterior oropharyngeal erythema present.   Eyes:      Extraocular Movements: Extraocular movements intact.      Conjunctiva/sclera: Conjunctivae normal.      Pupils: Pupils are equal, round, and reactive to light.   Cardiovascular:      Rate and Rhythm: Normal rate and regular rhythm.      Pulses: Normal pulses.      Heart sounds: Normal heart sounds.   Pulmonary:      Effort: Pulmonary effort is normal.      Breath sounds: Wheezing and rhonchi present.   Abdominal:      General: Abdomen is flat. Bowel sounds are normal.      Palpations: Abdomen is soft.    Musculoskeletal:         General: Normal range of motion.      Cervical back: Normal range of motion and neck supple.   Skin:     General: Skin is warm and dry.   Neurological:      General: No focal deficit present.      Mental Status: He is alert and oriented to person, place, and time. Mental status is at baseline.   Psychiatric:         Mood and Affect: Mood normal.         Behavior: Behavior normal.         Thought Content: Thought content normal.         Judgment: Judgment normal.         Assessment:       1. Bronchitis        Plan:     Bronchitis  -     dexAMETHasone injection 6 mg  -     cefTRIAXone injection 1 g  -     levoFLOXacin (LEVAQUIN) 500 MG tablet; Take 1 tablet (500 mg total) by mouth once daily. for 7 days  Dispense: 7 tablet; Refill: 0  -     predniSONE (DELTASONE) 20 MG tablet; Take 2 tablets (40 mg total) by mouth once daily. for 5 days  Dispense: 10 tablet; Refill: 0  -     benzonatate (TESSALON) 100 MG capsule; Take 1 capsule (100 mg total) by mouth 3 (three) times daily as needed for Cough.  Dispense: 20 capsule; Refill: 0

## 2023-12-04 PROBLEM — J85.1 ABSCESS OF UPPER LOBE OF RIGHT LUNG WITH PNEUMONIA: Status: RESOLVED | Noted: 2023-08-30 | Resolved: 2023-12-04

## 2023-12-05 ENCOUNTER — CLINICAL SUPPORT (OUTPATIENT)
Dept: REHABILITATION | Facility: HOSPITAL | Age: 63
End: 2023-12-05
Payer: COMMERCIAL

## 2023-12-05 DIAGNOSIS — M54.59 POSTURAL LOW BACK PAIN: Primary | ICD-10-CM

## 2023-12-05 PROCEDURE — 97012 MECHANICAL TRACTION THERAPY: CPT | Mod: CQ

## 2023-12-05 PROCEDURE — 97112 NEUROMUSCULAR REEDUCATION: CPT | Mod: CQ

## 2023-12-05 PROCEDURE — 97110 THERAPEUTIC EXERCISES: CPT | Mod: CQ

## 2023-12-05 NOTE — PLAN OF CARE
[facility]   Physical Therapy Discharge Summary     Name: Garett Banegas  [facility] Number: [id number]    Therapy Diagnosis: Postural low back pain     Physician: Mara Levin, NPLeninC    Visit Date: 12/5/2023     Physician Orders: PT Eval and Treat Low back  Medical Diagnosis from Referral: Postural low back pain   Evaluation Date: 10/3/2023  Authorization Period Expiration: 9/25/2024  Plan of Care Expiration: 11/28/2023  Progress Note Due: As needed  Visit # / Visits authorized: 10 / 16   FOTO: 44 /100  FOTO at discharge: 54/100    PTA Visit #: 1/5    Time In: 10:00 am  Time Out:10:53 am   Total Billable Time: 53 minutes    Precautions: multiple bulging discs   Functional Level at time of Evaluation:  Patient is working in constant pain with more limitations.     Subjective     Pt reports: pt has some increased pain today  He was compliant with home exercise program.    Pain: 8/10  Location: Low Back with Radiculopathy to Bilateral Knees      Objective       Kalpesh received therapeutic exercises to develop strength, endurance, ROM, flexibility, posture, and core stabilization for 23 minutes including:    Back Exercises : multiple rest breaks needed due to significant shortness of breath     Bike/Nustep  5 Min Nustep   Calf Stretch  4 x 15 sec    Hamstring Stretch  4 x 15 sec    Cybex Back  3 x 10 with 3 plates    DKC with Belt  10x10 sec hold   Cybex Leg Press -  Bilateral   3 x 10 with 8 plates    Cybex Leg Press -  Single      Cybex Hip - Abduction     Cybex Hip - Flexion     Cybex Hip - Extension  3 plates 20x (B)   Seated Trunk Flexion with SB  10x10 sec fwd               Kalpesh received the following manual therapy techniques: Joint mobilizations, Manual traction, Myofacial release, and Soft tissue Mobilization were applied to the: Low Back for 8 minutes, including:  Long Axis Traction  Trunk Rotation (B)  Piriformis (B)      Kalpesh participated in neuromuscular re-education  activities to improve: Coordination, Kinesthetic, Proprioception, and Posture for 0 minutes. The following activities were included:     Cybex rows wide and close 2 x 10 4 plates  Cable rotation 2 x 10 10#  Cable palloff press 2 x 10 10#  Lumbosacral rotation with multifidus activation/strengthening x 20      Kalpesh received the following supervised modalities after being cleared for contradictions: Mechanical Traction:  Garett received intermittent mechanical traction to the lumbar spine at a force of 85 pounds for a total of 15 minutes. Hold time of 60 seconds and rest time for 20 second. Patient tolerated treatment well without any adverse effects.            Home Exercises Provided and Patient Education Provided     Education provided: We reviewed the Home Exercise Program that he was given at time of Evaluation.     Written Home Exercises Provided: Patient instructed to cont prior HEP.  Exercises were reviewed and Kalpesh was able to demonstrate them prior to the end of the session.  Kalpesh demonstrated good  understanding of the education provided.     See EMR under Patient Instructions for exercises provided prior visit.    Assessment     Still some significant low back pain and SOB. Progressed mobility and strengthening exercises with fatigue noted. Pt is doing better just needs to continue with established HEP. Plan is to discharge to HEP today. Educated pt to call if any questions or concerns.       PMH from Evaluation:   Garett is a 63 y.o. male referred to outpatient Physical Therapy with a medical diagnosis of Postural low back pain. Patient presents with Low back pain, abnormal posture, core weakness, decreased lumbar motion, decreased flexibility of hamstrings, hips and piriformis. Patient brought his MRI report from yesterday. 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 There is postoperative changes of previous fusion at this level and Disc bulging and bilateral neural canal narrowing L5-S1. Patient has positive sitting slump test and STRAIGHT LEG RAISE test bilateral. Patient will benefit from skilled Physical Therapy intervention to address all deficits and help patient to return to their prior level of function.         Kalpesh Is progressing well towards his goals.   Pt prognosis is Good.     Pt will continue to benefit from skilled outpatient physical therapy to address the deficits listed in the problem list box on initial evaluation, provide pt/family education and to maximize pt's level of independence in the home and community environment.     Pt's spiritual, cultural and educational needs considered and pt agreeable to plan of care and goals.     Anticipated barriers to physical therapy: None    Goals:  Short Term Goals: 4 weeks   1. Patient will be Independent with Home Exercise Program : Met  2. Patient will demonstrate with improved Posture and Body Mechanics : Met  3. Patient will increase Lumbosacral Range of Motion by 15% :   4. Patient will increase Lower Extremity Strength to grossly 5/5 : Met  5. Patient will decrease complaints of pain with activity to Less than or Equal to 5/10 :     Long Term Goals: 8 weeks   1. Patient will tolerate 30 minutes of activity with complaints of Pain at Less Than or Equal to 3/10 :  2. Patient will increase Core Strength to grossly 4/5 or greater : Met    Discharge reason: Patient has completed the physician's prescription and Patient has reached the maximum rehab potential for the present time    Plan   This patient is discharged from Physical Therapy.    Date of Last visit: 12/5/2023  Total Visits Received: 10  Cancelled Visits: 3  No Show Visits: 0    CHARISSA GARCIA, PT, MLT

## 2023-12-05 NOTE — PROGRESS NOTES
[facility]   Physical Therapy Treatment Note     Name: Garett Banegas  [facility] Number: [id number]    Therapy Diagnosis: Postural low back pain     Physician: Mara Levin, NPLeninC    Visit Date: 12/5/2023     Physician Orders: PT Eval and Treat Low back  Medical Diagnosis from Referral: Postural low back pain   Evaluation Date: 10/3/2023  Authorization Period Expiration: 9/25/2024  Plan of Care Expiration: 11/28/2023  Progress Note Due: As needed  Visit # / Visits authorized: 10 / 16   FOTO: 44 /100  FOTO at discharge: 54/100    PTA Visit #: 1/5    Time In: 10:00 am  Time Out:10:53 am   Total Billable Time: 53 minutes    Precautions: multiple bulging discs   Functional Level at time of Evaluation:  Patient is working in constant pain with more limitations.     Subjective     Pt reports: pt has some increased pain today  He was compliant with home exercise program.    Pain: 8/10  Location: Low Back with Radiculopathy to Bilateral Knees      Objective       Kalpesh received therapeutic exercises to develop strength, endurance, ROM, flexibility, posture, and core stabilization for 23 minutes including:    Back Exercises : multiple rest breaks needed due to significant shortness of breath     Bike/Nustep  5 Min Nustep   Calf Stretch  4 x 15 sec    Hamstring Stretch  4 x 15 sec    Cybex Back  3 x 10 with 3 plates    DKC with Belt  10x10 sec hold   Cybex Leg Press -  Bilateral   3 x 10 with 8 plates    Cybex Leg Press -  Single      Cybex Hip - Abduction     Cybex Hip - Flexion     Cybex Hip - Extension  3 plates 20x (B)   Seated Trunk Flexion with SB  10x10 sec fwd               Kalpesh received the following manual therapy techniques: Joint mobilizations, Manual traction, Myofacial release, and Soft tissue Mobilization were applied to the: Low Back for 8 minutes, including:  Long Axis Traction  Trunk Rotation (B)  Piriformis (B)      Kalpesh participated in neuromuscular re-education  activities to improve: Coordination, Kinesthetic, Proprioception, and Posture for 0 minutes. The following activities were included:     Cybex rows wide and close 2 x 10 4 plates  Cable rotation 2 x 10 10#  Cable palloff press 2 x 10 10#  Lumbosacral rotation with multifidus activation/strengthening x 20      Kalpesh received the following supervised modalities after being cleared for contradictions: Mechanical Traction:  Garett received intermittent mechanical traction to the lumbar spine at a force of 85 pounds for a total of 15 minutes. Hold time of 60 seconds and rest time for 20 second. Patient tolerated treatment well without any adverse effects.            Home Exercises Provided and Patient Education Provided     Education provided: We reviewed the Home Exercise Program that he was given at time of Evaluation.     Written Home Exercises Provided: Patient instructed to cont prior HEP.  Exercises were reviewed and Kalpesh was able to demonstrate them prior to the end of the session.  Kalpesh demonstrated good  understanding of the education provided.     See EMR under Patient Instructions for exercises provided prior visit.    Assessment     Still some significant low back pain and SOB. Progressed mobility and strengthening exercises with fatigue noted. Pt is doing better just needs to continue with established HEP. Plan is to discharge to HEP today. Educated pt to call if any questions or concerns.       PMH from Evaluation:   Garett is a 63 y.o. male referred to outpatient Physical Therapy with a medical diagnosis of Postural low back pain. Patient presents with Low back pain, abnormal posture, core weakness, decreased lumbar motion, decreased flexibility of hamstrings, hips and piriformis. Patient brought his MRI report from yesterday. 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 There is postoperative changes of previous fusion at this level and Disc bulging and bilateral neural canal narrowing L5-S1. Patient has positive sitting slump test and STRAIGHT LEG RAISE test bilateral. Patient will benefit from skilled Physical Therapy intervention to address all deficits and help patient to return to their prior level of function.         Kalpesh Is progressing well towards his goals.   Pt prognosis is Good.     Pt will continue to benefit from skilled outpatient physical therapy to address the deficits listed in the problem list box on initial evaluation, provide pt/family education and to maximize pt's level of independence in the home and community environment.     Pt's spiritual, cultural and educational needs considered and pt agreeable to plan of care and goals.     Anticipated barriers to physical therapy: None    Goals:  Short Term Goals: 4 weeks   1. Patient will be Independent with Home Exercise Program : Met  2. Patient will demonstrate with improved Posture and Body Mechanics : Met  3. Patient will increase Lumbosacral Range of Motion by 15% :   4. Patient will increase Lower Extremity Strength to grossly 5/5 :  5. Patient will decrease complaints of pain with activity to Less than or Equal to 5/10 :     Long Term Goals: 8 weeks   1. Patient will tolerate 30 minutes of activity with complaints of Pain at Less Than or Equal to 3/10 :  2. Patient will increase Core Strength to grossly 4/5 or greater :      Plan     Plan of care Certification: 10/3/2023 to 11/28/2023.     Outpatient Physical Therapy 2 times weekly for 8 weeks to include the following interventions: Lumbar Traction, Electrical Stimulation Pre-Mod, Manual Therapy, Moist Heat/ Ice, Neuromuscular Re-ed, Patient Education, Therapeutic Activities, and Therapeutic Exercise.     Stuart Torres, PTA,    12/5/2023

## 2023-12-11 ENCOUNTER — PATIENT OUTREACH (OUTPATIENT)
Dept: ADMINISTRATIVE | Facility: HOSPITAL | Age: 63
End: 2023-12-11

## 2023-12-11 NOTE — PROGRESS NOTES
Population Health Chart Review & Patient Outreach Details      Per [employer] website, insurance is active and pt is listed on the attributed list needing a healthy you performed in   .Pt needs appt for hy, sent to [facility] to schedule via one note       Updates Requested / Reviewed:     []  Care Everywhere    []     []  External Sources ([facility], [facility], [facility], etc.)    [] [facility]   [] [facility]   [] Other:    []  Care Team Updated   []  Removed  or Duplicate Orders   []  Immunization Reconciliation Completed / Queried    [] [place]   [] [place]   [] [place]   [] [place]      Health Maintenance Topics Addressed and Outreach Outcomes / Actions Taken:             Breast Cancer Screening []  Mammogram Order Placed    []  Mammogram Screening Scheduled    []  External Records Requested & Care Team Updated if Applicable    []  External Records Uploaded & Care Team Updated if Applicable    []  Pt Declined Scheduling Mammogram    []  Pt Will Schedule with External Provider / Order Routed & Care Team Updated if Applicable              Cervical Cancer Screening []  Pap Smear Scheduled in Primary Care or OBGYN    []  External Records Requested & Care Team Updated if Applicable       []  External Records Uploaded, Care Team Updated, & History Updated if Applicable    []  Patient Declined Scheduling Pap Smear    []  Patient Will Schedule with External Provider & Care Team Updated if Applicable                  Colorectal Cancer Screening []  Colonoscopy Case Request / Referral / Home Test Order Placed    []  External Records Requested & Care Team Updated if Applicable    []  External Records Uploaded, Care Team Updated, & History Updated if Applicable    []  Patient Declined Completing Colon Cancer Screening    []  Patient Will Schedule with External Provider & Care Team Updated if Applicable    []  Fit Kit Mailed (add the SmartPhrase under additional notes)    []  Reminded Patient to Complete Home Test                 Diabetic Eye Exam []  Eye Exam Screening Order Placed    []  Eye Camera Scheduled or Optometry/Ophthalmology Referral Placed    []  External Records Requested & Care Team Updated if Applicable    []  External Records Uploaded, Care Team Updated, & History Updated if Applicable    []  Patient Declined Scheduling Eye Exam    []  Patient Will Schedule with External Provider & Care Team Updated if Applicable             Blood Pressure Control []  Primary Care Follow Up Visit Scheduled     []  Remote Blood Pressure Reading Captured    []  Patient Declined Remote Reading or Scheduling Appt - Escalated to PCP    []  Patient Will Call Back or Send Portal Message with Reading                 HbA1c & Other Labs []  Overdue Lab(s) Ordered    []  Overdue Lab(s) Scheduled    []  External Records Uploaded & Care Team Updated if Applicable    []  Primary Care Follow Up Visit Scheduled     []  Reminded Patient to Complete A1c Home Test    []  Patient Declined Scheduling Labs or Will Call Back to Schedule    []  Patient Will Schedule with External Provider / Order Routed, & Care Team Updated if Applicable           Primary Care Appointment []  Primary Care Appt Scheduled    []  Patient Declined Scheduling or Will Call Back to Schedule    []  Pt Established with External Provider, Updated Care Team, & Informed Pt to Notify Payor if Applicable           Medication Adherence /    Statin Use []  Primary Care Appointment Scheduled    []  Patient Reminded to  Prescription    []  Patient Declined, Provider Notified if Needed    []  Sent Provider Message to Review to Evaluate Pt for Statin, Add Exclusion Dx Codes, Document   Exclusion in Problem List, Change Statin Intensity Level to Moderate or High Intensity if Applicable                Osteoporosis Screening []  Dexa Order Placed    []  Dexa Appointment Scheduled    []  External Records Requested & Care Team Updated    []  External Records Uploaded, Care Team Updated, & History  Updated if Applicable    []  Patient Declined Scheduling Dexa or Will Call Back to Schedule    []  Patient Will Schedule with External Provider / Order Routed & Care Team Updated if Applicable       Additional Notes:

## 2023-12-12 ENCOUNTER — OFFICE VISIT (OUTPATIENT)
Dept: FAMILY MEDICINE | Facility: CLINIC | Age: 63
End: 2023-12-12
Payer: COMMERCIAL

## 2023-12-12 VITALS
SYSTOLIC BLOOD PRESSURE: 141 MMHG | BODY MASS INDEX: 19.46 KG/M2 | WEIGHT: 139 LBS | TEMPERATURE: 99 F | RESPIRATION RATE: 20 BRPM | OXYGEN SATURATION: 97 % | HEART RATE: 80 BPM | HEIGHT: 71 IN | DIASTOLIC BLOOD PRESSURE: 92 MMHG

## 2023-12-12 DIAGNOSIS — E78.2 MIXED HYPERLIPIDEMIA: ICD-10-CM

## 2023-12-12 DIAGNOSIS — I25.10 CORONARY ARTERY DISEASE INVOLVING NATIVE CORONARY ARTERY OF NATIVE HEART WITHOUT ANGINA PECTORIS: ICD-10-CM

## 2023-12-12 DIAGNOSIS — I10 PRIMARY HYPERTENSION: ICD-10-CM

## 2023-12-12 DIAGNOSIS — J44.9 COPD, SEVERITY TO BE DETERMINED: ICD-10-CM

## 2023-12-12 DIAGNOSIS — J41.1 MUCOPURULENT CHRONIC BRONCHITIS: Primary | ICD-10-CM

## 2023-12-12 PROCEDURE — 1160F RVW MEDS BY RX/DR IN RCRD: CPT | Mod: ,,, | Performed by: FAMILY MEDICINE

## 2023-12-12 PROCEDURE — 3080F PR MOST RECENT DIASTOLIC BLOOD PRESSURE >= 90 MM HG: ICD-10-PCS | Mod: ,,, | Performed by: FAMILY MEDICINE

## 2023-12-12 PROCEDURE — 1159F MED LIST DOCD IN RCRD: CPT | Mod: ,,, | Performed by: FAMILY MEDICINE

## 2023-12-12 PROCEDURE — 3077F SYST BP >= 140 MM HG: CPT | Mod: ,,, | Performed by: FAMILY MEDICINE

## 2023-12-12 PROCEDURE — 99214 OFFICE O/P EST MOD 30 MIN: CPT | Mod: ,,, | Performed by: FAMILY MEDICINE

## 2023-12-12 PROCEDURE — 1159F PR MEDICATION LIST DOCUMENTED IN MEDICAL RECORD: ICD-10-PCS | Mod: ,,, | Performed by: FAMILY MEDICINE

## 2023-12-12 PROCEDURE — 99214 PR OFFICE/OUTPT VISIT, EST, LEVL IV, 30-39 MIN: ICD-10-PCS | Mod: ,,, | Performed by: FAMILY MEDICINE

## 2023-12-12 PROCEDURE — 3008F PR BODY MASS INDEX (BMI) DOCUMENTED: ICD-10-PCS | Mod: ,,, | Performed by: FAMILY MEDICINE

## 2023-12-12 PROCEDURE — 4010F ACE/ARB THERAPY RXD/TAKEN: CPT | Mod: ,,, | Performed by: FAMILY MEDICINE

## 2023-12-12 PROCEDURE — 1160F PR REVIEW ALL MEDS BY PRESCRIBER/CLIN PHARMACIST DOCUMENTED: ICD-10-PCS | Mod: ,,, | Performed by: FAMILY MEDICINE

## 2023-12-12 PROCEDURE — 3077F PR MOST RECENT SYSTOLIC BLOOD PRESSURE >= 140 MM HG: ICD-10-PCS | Mod: ,,, | Performed by: FAMILY MEDICINE

## 2023-12-12 PROCEDURE — 3008F BODY MASS INDEX DOCD: CPT | Mod: ,,, | Performed by: FAMILY MEDICINE

## 2023-12-12 PROCEDURE — 4010F PR ACE/ARB THEARPY RXD/TAKEN: ICD-10-PCS | Mod: ,,, | Performed by: FAMILY MEDICINE

## 2023-12-12 PROCEDURE — 3080F DIAST BP >= 90 MM HG: CPT | Mod: ,,, | Performed by: FAMILY MEDICINE

## 2023-12-12 NOTE — PROGRESS NOTES
Giancarlo Azar MD        PATIENT NAME: Garett Banegas  : 1960  DATE: 23  MRN: [id number]      Billing Provider: Giancarlo Azar MD  Level of Service:   Patient PCP Information       Provider PCP Type    Giancarlo Azar MD General            Reason for Visit / Chief Complaint: URI (SOB, coughing so hard it makes his chest hurt, coughing up brown mucous Patient states this has been going on for months and was diagnosed with bronchitis. Completed antibiotics, not better)       Update PCP  Update Chief Complaint         History of Present Illness / Problem Focused Workflow     Garett Banegas presents to the clinic with URI (SOB, coughing so hard it makes his chest hurt, coughing up brown mucous Patient states this has been going on for months and was diagnosed with bronchitis. Completed antibiotics, not better)     Chronic SOB.  Smokes.  Workup including pulmonary.  Wants to quit smoking has used Chantix and patches without success.    URI   Associated symptoms include coughing. Pertinent negatives include no abdominal pain, chest pain, congestion, diarrhea, headaches, nausea, neck pain, rash, rhinorrhea, sinus pain, sore throat or wheezing.       Review of Systems     Review of Systems   Constitutional:  Negative for activity change, appetite change, fever and unexpected weight change.   HENT:  Negative for congestion, rhinorrhea, sinus pressure, sinus pain, sore throat and trouble swallowing.    Eyes:  Negative for photophobia, pain, discharge and visual disturbance.   Respiratory:  Positive for cough and shortness of breath. Negative for chest tightness, wheezing and stridor.    Cardiovascular:  Negative for chest pain, palpitations and leg swelling.   Gastrointestinal:  Negative for abdominal pain, blood in stool, constipation, diarrhea and nausea.   Endocrine: Negative for polydipsia, polyphagia and polyuria.   Genitourinary:  Negative for difficulty urinating, flank pain and hematuria.    Musculoskeletal:  Negative for arthralgias and neck pain.   Skin:  Negative for rash.   Allergic/Immunologic: Negative for food allergies.   Neurological:  Negative for dizziness, tremors, seizures, syncope, weakness (global weakness) and headaches.   Psychiatric/Behavioral:  Negative for behavioral problems, confusion, decreased concentration, dysphoric mood and hallucinations. The patient is not nervous/anxious.         Medical / Social / Family History     Past Medical History:   Diagnosis Date    Asthma     CAD (coronary artery disease)     Compressed cervical disc 07/2023    COPD (chronic obstructive pulmonary disease)     Hyperlipidemia     Hypertension     Low back pain     Tobacco dependence due to cigarettes        Past Surgical History:   Procedure Laterality Date    APPENDECTOMY      BACK SURGERY      x3    HAND SURGERY      ROTATOR CUFF REPAIR Right     SINUS SURGERY         Social History    reports that he has been smoking. He has been exposed to tobacco smoke. He has never used smokeless tobacco. He reports current alcohol use. He reports that he does not use drugs.    Family History  's family history includes Cancer in an other family member; Diabetes in an other family member; Epilepsy in an other family member; Heart disease in an other family member; Hypertension in an other family member; Stroke in an other family member.    Medications and Allergies     Medications  No outpatient medications have been marked as taking for the 12/12/23 encounter (Office Visit) with Giancarlo Azar MD.       Allergies  Review of patient's allergies indicates:   Allergen Reactions    Penicillins Rash     Other reaction(s): Hives, Unknown       Physical Examination     Vitals:    12/12/23 1110   BP: (!) 141/92   Pulse: 80   Resp: 20   Temp: 99.1 °F (37.3 °C)     Physical Exam  Constitutional:       General: He is not in acute distress.     Appearance: Normal appearance.   HENT:      Head: Normocephalic.       Right Ear: Tympanic membrane and ear canal normal.      Left Ear: Tympanic membrane and ear canal normal.      Nose: Nose normal.      Mouth/Throat:      Mouth: Mucous membranes are moist.      Pharynx: No oropharyngeal exudate.   Eyes:      Extraocular Movements: Extraocular movements intact.      Pupils: Pupils are equal, round, and reactive to light.   Cardiovascular:      Rate and Rhythm: Normal rate and regular rhythm.      Heart sounds: No murmur heard.  Pulmonary:      Effort: Pulmonary effort is normal.      Breath sounds: Normal breath sounds. No wheezing.      Comments: Decreased breath sounds in general.  Abdominal:      General: Abdomen is flat. Bowel sounds are normal.      Palpations: Abdomen is soft.      Hernia: No hernia is present.   Musculoskeletal:         General: Normal range of motion.      Cervical back: Normal range of motion and neck supple.      Right lower leg: No edema.      Left lower leg: No edema.   Lymphadenopathy:      Cervical: No cervical adenopathy.   Skin:     General: Skin is warm and dry.      Coloration: Skin is not jaundiced.      Findings: No lesion.   Neurological:      General: No focal deficit present.      Mental Status: He is alert and oriented to person, place, and time.      Cranial Nerves: No cranial nerve deficit.      Gait: Gait normal.   Psychiatric:         Mood and Affect: Mood normal.         Behavior: Behavior normal.         Judgment: Judgment normal.          Assessment and Plan (including Health Maintenance)      Problem List  Smart Sets  Document Outside HM   :    Plan:           Health Maintenance Due   Topic Date Due    Hepatitis C Screening  Never done    COVID-19 Vaccine (1) Never done    Pneumococcal Vaccines (Age 0-64) (1 - PCV) Never done    HIV Screening  Never done    TETANUS VACCINE  Never done    Colorectal Cancer Screening  Never done    Shingles Vaccine (1 of 2) Never done    RSV Vaccine (Age 60+ and Pregnant patients) (1 - 1-dose 60+  series) Never done    Influenza Vaccine (1) 09/01/2023       1. Mucopurulent chronic bronchitis  -     fluticasone-umeclidin-vilanter (TRELEGY ELLIPTA) 100-62.5-25 mcg DsDv; Inhale 1 puff into the lungs once daily.  Dispense: 60 each; Refill: 11    2. COPD, severity to be determined    3. Coronary artery disease involving native coronary artery of native heart without angina pectoris    4. Primary hypertension    5. Mixed hyperlipidemia         Health Maintenance Topics with due status: Not Due       Topic Last Completion Date    Lipid Panel 07/14/2021    High Dose Statin 11/26/2023    Aspirin/Antiplatelet Therapy 11/26/2023       Future Appointments   Date Time Provider Department Center   12/29/2023 10:30 AM Shemar Lang MD [facility]        There are no Patient Instructions on file for this visit.  Follow up in about 4 weeks (around 1/9/2024) for routine followup.     Signature:  Giancarlo Azar MD      Date of encounter: 12/12/23

## 2024-01-10 ENCOUNTER — OFFICE VISIT (OUTPATIENT)
Dept: FAMILY MEDICINE | Facility: CLINIC | Age: 64
End: 2024-01-10
Payer: COMMERCIAL

## 2024-01-10 VITALS
DIASTOLIC BLOOD PRESSURE: 77 MMHG | RESPIRATION RATE: 20 BRPM | HEART RATE: 92 BPM | WEIGHT: 137 LBS | HEIGHT: 71 IN | OXYGEN SATURATION: 96 % | SYSTOLIC BLOOD PRESSURE: 126 MMHG | BODY MASS INDEX: 19.18 KG/M2

## 2024-01-10 DIAGNOSIS — I10 PRIMARY HYPERTENSION: ICD-10-CM

## 2024-01-10 DIAGNOSIS — J44.9 COPD, SEVERITY TO BE DETERMINED: Primary | ICD-10-CM

## 2024-01-10 PROCEDURE — 99495 TRANSJ CARE MGMT MOD F2F 14D: CPT | Mod: ,,, | Performed by: FAMILY MEDICINE

## 2024-01-10 PROCEDURE — 3078F DIAST BP <80 MM HG: CPT | Mod: ,,, | Performed by: FAMILY MEDICINE

## 2024-01-10 PROCEDURE — 1159F MED LIST DOCD IN RCRD: CPT | Mod: ,,, | Performed by: FAMILY MEDICINE

## 2024-01-10 PROCEDURE — 1160F RVW MEDS BY RX/DR IN RCRD: CPT | Mod: ,,, | Performed by: FAMILY MEDICINE

## 2024-01-10 PROCEDURE — 3074F SYST BP LT 130 MM HG: CPT | Mod: ,,, | Performed by: FAMILY MEDICINE

## 2024-01-10 NOTE — PROGRESS NOTES
Giancarlo Azar MD        PATIENT NAME: Garett Banegas  : 1960  DATE: 1/10/24  MRN: [id number]      Billing Provider: Giancarlo Azar MD  Level of Service: TCM SERVICES (MODERATE COMPLEXITY)  Patient PCP Information       Provider PCP Type    Giancarlo Azar MD General            Reason for Visit / Chief Complaint: Transitional Care (Was in hospital with SOB, air hunger, COPD. Patient states he was told he may have lung cancer. Currently taking antibiotics. Cough, congestion x 1-2 months)       Update PCP  Update Chief Complaint         History of Present Illness / Problem Focused Workflow     Garett Banegas presents to the clinic with Transitional Care (Was in hospital with SOB, air hunger, COPD. Patient states he was told he may have lung cancer. Currently taking antibiotics. Cough, congestion x 1-2 months)     TCC for COPD exacerbation.  To see Pulmonary tomorrow.          Review of Systems     Review of Systems   Constitutional:  Negative for activity change, appetite change, fever and unexpected weight change.   HENT:  Positive for congestion. Negative for rhinorrhea, sinus pressure, sinus pain, sore throat and trouble swallowing.    Eyes:  Negative for photophobia, pain, discharge and visual disturbance.   Respiratory:  Positive for cough. Negative for chest tightness, wheezing and stridor.    Cardiovascular:  Negative for chest pain, palpitations and leg swelling.   Gastrointestinal:  Negative for abdominal pain, blood in stool, constipation, diarrhea and nausea.   Endocrine: Negative for polydipsia, polyphagia and polyuria.   Genitourinary:  Negative for difficulty urinating, flank pain and hematuria.   Musculoskeletal:  Negative for arthralgias and neck pain.   Skin:  Negative for rash.   Allergic/Immunologic: Negative for food allergies.   Neurological:  Negative for dizziness, tremors, seizures, syncope, weakness (global weakness) and headaches.   Psychiatric/Behavioral:  Negative for  behavioral problems, confusion, decreased concentration, dysphoric mood and hallucinations. The patient is not nervous/anxious.         Medical / Social / Family History     Past Medical History:   Diagnosis Date    Asthma     CAD (coronary artery disease)     Compressed cervical disc 07/2023    COPD (chronic obstructive pulmonary disease)     Hyperlipidemia     Hypertension     Low back pain     Tobacco dependence due to cigarettes        Past Surgical History:   Procedure Laterality Date    APPENDECTOMY      BACK SURGERY      x3    HAND SURGERY      ROTATOR CUFF REPAIR Right     SINUS SURGERY         Social History    reports that he has been smoking. He has been exposed to tobacco smoke. He has never used smokeless tobacco. He reports current alcohol use. He reports that he does not use drugs.    Family History  's family history includes Cancer in an other family member; Diabetes in an other family member; Epilepsy in an other family member; Heart disease in an other family member; Hypertension in an other family member; Stroke in an other family member.    Medications and Allergies     Medications  No outpatient medications have been marked as taking for the 1/10/24 encounter (Office Visit) with Giancarlo Azar MD.       Allergies  Review of patient's allergies indicates:   Allergen Reactions    Penicillins Rash     Other reaction(s): Hives, Unknown       Physical Examination     Vitals:    01/10/24 0939   BP: 126/77   Pulse: 92   Resp: 20     Physical Exam  Constitutional:       General: He is not in acute distress.     Appearance: Normal appearance.   HENT:      Head: Normocephalic.      Right Ear: Tympanic membrane and ear canal normal.      Left Ear: Tympanic membrane and ear canal normal.      Nose: Nose normal.      Mouth/Throat:      Mouth: Mucous membranes are moist.      Pharynx: No oropharyngeal exudate.   Eyes:      Extraocular Movements: Extraocular movements intact.      Pupils: Pupils are  equal, round, and reactive to light.   Cardiovascular:      Rate and Rhythm: Normal rate and regular rhythm.      Heart sounds: No murmur heard.  Pulmonary:      Effort: Pulmonary effort is normal.      Breath sounds: Normal breath sounds. No wheezing.   Abdominal:      General: Abdomen is flat. Bowel sounds are normal.      Palpations: Abdomen is soft.      Hernia: No hernia is present.   Musculoskeletal:         General: Normal range of motion.      Cervical back: Normal range of motion and neck supple.      Right lower leg: No edema.      Left lower leg: No edema.   Lymphadenopathy:      Cervical: No cervical adenopathy.   Skin:     General: Skin is warm and dry.      Coloration: Skin is not jaundiced.      Findings: No lesion.   Neurological:      General: No focal deficit present.      Mental Status: He is alert and oriented to person, place, and time.      Cranial Nerves: No cranial nerve deficit.      Gait: Gait normal.   Psychiatric:         Mood and Affect: Mood normal.         Behavior: Behavior normal.         Judgment: Judgment normal.          Assessment and Plan (including Health Maintenance)      Problem List  Smart Sets  Document Outside HM   :    Plan:           Health Maintenance Due   Topic Date Due    Pneumococcal Vaccines (Age 0-64) (1 - PCV) Never done    TETANUS VACCINE  Never done    Colorectal Cancer Screening  Never done    Shingles Vaccine (1 of 2) Never done    RSV Vaccine (Age 60+ and Pregnant patients) (1 - 1-dose 60+ series) Never done    Influenza Vaccine (1) 09/01/2023       1. COPD, severity to be determined    2. Primary hypertension         Health Maintenance Topics with due status: Not Due       Topic Last Completion Date    Lipid Panel 07/14/2021    High Dose Statin 11/26/2023    Aspirin/Antiplatelet Therapy 11/26/2023       Future Appointments   Date Time Provider Department Center   2/12/2024 10:00 AM Giancarlo Azar MD [facility]        There are no Patient  Instructions on file for this visit.  Follow up if symptoms worsen or fail to improve.     Signature:  Giancarlo Azar MD      Date of encounter: 1/10/24

## 2024-02-21 DIAGNOSIS — Z13.220 SCREENING FOR LIPOID DISORDERS: ICD-10-CM

## 2024-02-21 DIAGNOSIS — Z13.1 SCREENING FOR DIABETES MELLITUS: Primary | ICD-10-CM

## 2024-03-18 ENCOUNTER — OFFICE VISIT (OUTPATIENT)
Dept: FAMILY MEDICINE | Facility: CLINIC | Age: 64
End: 2024-03-18
Payer: COMMERCIAL

## 2024-03-18 VITALS
SYSTOLIC BLOOD PRESSURE: 140 MMHG | BODY MASS INDEX: 19.6 KG/M2 | DIASTOLIC BLOOD PRESSURE: 90 MMHG | TEMPERATURE: 99 F | RESPIRATION RATE: 16 BRPM | HEIGHT: 71 IN | HEART RATE: 76 BPM | OXYGEN SATURATION: 99 % | WEIGHT: 140 LBS

## 2024-03-18 DIAGNOSIS — Z12.5 PROSTATE CANCER SCREENING: Primary | ICD-10-CM

## 2024-03-18 DIAGNOSIS — Z12.11 COLON CANCER SCREENING: ICD-10-CM

## 2024-03-18 LAB — PSA SERPL-MCNC: 1.17 NG/ML

## 2024-03-18 PROCEDURE — 90677 PCV20 VACCINE IM: CPT | Mod: ,,, | Performed by: FAMILY MEDICINE

## 2024-03-18 PROCEDURE — 90471 IMMUNIZATION ADMIN: CPT | Mod: ,,, | Performed by: FAMILY MEDICINE

## 2024-03-18 PROCEDURE — G0103 PSA SCREENING: HCPCS | Mod: ,,, | Performed by: CLINICAL MEDICAL LABORATORY

## 2024-03-18 PROCEDURE — 3008F BODY MASS INDEX DOCD: CPT | Mod: ,,, | Performed by: FAMILY MEDICINE

## 2024-03-18 PROCEDURE — 3080F DIAST BP >= 90 MM HG: CPT | Mod: ,,, | Performed by: FAMILY MEDICINE

## 2024-03-18 PROCEDURE — 99396 PREV VISIT EST AGE 40-64: CPT | Mod: 25,,, | Performed by: FAMILY MEDICINE

## 2024-03-18 PROCEDURE — 1159F MED LIST DOCD IN RCRD: CPT | Mod: ,,, | Performed by: FAMILY MEDICINE

## 2024-03-18 PROCEDURE — 3077F SYST BP >= 140 MM HG: CPT | Mod: ,,, | Performed by: FAMILY MEDICINE

## 2024-03-18 RX ORDER — BUDESONIDE AND FORMOTEROL FUMARATE DIHYDRATE 160; 4.5 UG/1; UG/1
2 AEROSOL RESPIRATORY (INHALATION)
COMMUNITY
Start: 2024-03-05 | End: 2025-03-05

## 2024-03-18 RX ORDER — TIOTROPIUM BROMIDE INHALATION SPRAY 3.12 UG/1
2 SPRAY, METERED RESPIRATORY (INHALATION)
COMMUNITY
Start: 2024-03-05

## 2024-03-18 NOTE — PROGRESS NOTES
Subjective     Patient ID: Garett Banegas is a 63 y.o. male.    Chief Complaint: Healthy You (Z00.00)    Hy Routine followup.  No significant interval change.  COPD.        Review of Systems   Constitutional:  Negative for activity change, appetite change, fatigue, fever and unexpected weight change.   HENT:  Negative for nasal congestion, dental problem, ear pain, hearing loss, mouth sores, nosebleeds, sore throat, tinnitus and voice change.    Eyes:  Negative for pain, discharge and visual disturbance.   Respiratory:  Positive for shortness of breath. Negative for apnea, choking, chest tightness and wheezing.    Cardiovascular:  Negative for chest pain, palpitations, leg swelling and claudication.   Gastrointestinal:  Negative for abdominal pain, blood in stool and change in bowel habit.   Endocrine: Negative for polydipsia, polyphagia and polyuria.   Genitourinary:  Negative for bladder incontinence, dysuria, erectile dysfunction, flank pain, genital sores and hematuria.   Musculoskeletal:  Negative for arthralgias, gait problem, neck pain and neck stiffness.   Integumentary:  Negative for rash, wound, mole/lesion, breast mass and breast discharge.   Allergic/Immunologic: Negative for food allergies.   Neurological:  Negative for seizures, syncope, headaches, memory loss and coordination difficulties.   Hematological:  Negative for adenopathy. Does not bruise/bleed easily.   Psychiatric/Behavioral:  Negative for agitation, behavioral problems, confusion, decreased concentration, hallucinations, self-injury, sleep disturbance and suicidal ideas. The patient is not nervous/anxious.    Breast: Negative for mass      Tobacco Use: High Risk (3/18/2024)    Patient History     Smoking Tobacco Use: Every Day     Smokeless Tobacco Use: Never     Passive Exposure: Past     Review of patient's allergies indicates:   Allergen Reactions    Penicillins Rash     Other reaction(s): Hives, Unknown     Current Outpatient  "Medications   Medication Instructions    albuterol (PROVENTIL HFA) 90 mcg/actuation inhaler 2 puffs, Inhalation, Every 6 hours PRN, Rescue     amLODIPine (NORVASC) 5 MG tablet No dose, route, or frequency recorded.    aspirin (ECOTRIN) 81 mg, Oral, Daily    benzonatate (TESSALON) 100 mg, Oral, 3 times daily PRN    budesonide (PULMICORT FLEXHALER) 180 mcg, Inhalation, 2 times daily, Controller    budesonide-formoterol 160-4.5 mcg (SYMBICORT) 160-4.5 mcg/actuation HFAA 2 puffs, Inhalation    carvediloL (COREG) 6.25 MG tablet 1 tablet, Oral, Daily    clopidogreL (PLAVIX) 75 mg tablet Oral    fluticasone-umeclidin-vilanter (TRELEGY ELLIPTA) 100-62.5-25 mcg DsDv 1 puff, Inhalation, Daily    irbesartan (AVAPRO) 150 MG tablet No dose, route, or frequency recorded.    losartan (COZAAR) 50 mg, Oral, Daily    morphine (MS CONTIN) 30 mg, Oral, Every 12 hours    nicotine (NICODERM CQ) 21 mg/24 hr 1 patch, Transdermal, Daily    rosuvastatin (CRESTOR) 20 mg, Oral, Daily    SPIRIVA RESPIMAT 2.5 mcg/actuation inhaler 2 puffs, Inhalation     There are no discontinued medications.    Past Medical History:   Diagnosis Date    Asthma     CAD (coronary artery disease)     Compressed cervical disc 07/2023    COPD (chronic obstructive pulmonary disease)     Hyperlipidemia     Hypertension     Low back pain     Tobacco dependence due to cigarettes      Health Maintenance Topics with due status: Not Due       Topic Last Completion Date    High Dose Statin 11/26/2023    Aspirin/Antiplatelet Therapy 11/26/2023    Lipid Panel 03/04/2024       There is no immunization history on file for this patient.    Objective     Body mass index is 19.53 kg/m².  Wt Readings from Last 3 Encounters:   03/18/24 63.5 kg (140 lb)   01/10/24 62.1 kg (137 lb)   12/12/23 63 kg (139 lb)     Ht Readings from Last 3 Encounters:   03/18/24 5' 11" (1.803 m)   01/10/24 5' 11" (1.803 m)   12/12/23 5' 11" (1.803 m)     BP Readings from Last 3 Encounters:   03/18/24 (!) " 140/90   01/10/24 126/77   12/12/23 (!) 141/92     Temp Readings from Last 3 Encounters:   03/18/24 98.7 °F (37.1 °C) (Oral)   12/12/23 99.1 °F (37.3 °C) (Oral)   11/26/23 98.7 °F (37.1 °C)     Pulse Readings from Last 3 Encounters:   03/18/24 76   01/10/24 92   12/12/23 80     Resp Readings from Last 3 Encounters:   03/18/24 16   01/10/24 20   12/12/23 20     PF Readings from Last 3 Encounters:   10/23/23 543 L/min       Physical Exam  Constitutional:       General: He is not in acute distress.     Appearance: Normal appearance.   HENT:      Head: Normocephalic.      Right Ear: Tympanic membrane and ear canal normal.      Left Ear: Tympanic membrane and ear canal normal.      Nose: Nose normal.      Mouth/Throat:      Mouth: Mucous membranes are moist.      Pharynx: No oropharyngeal exudate.   Eyes:      Extraocular Movements: Extraocular movements intact.      Pupils: Pupils are equal, round, and reactive to light.   Cardiovascular:      Rate and Rhythm: Normal rate and regular rhythm.      Heart sounds: No murmur heard.  Pulmonary:      Effort: Pulmonary effort is normal.      Breath sounds: Normal breath sounds. No wheezing.   Abdominal:      General: Abdomen is flat. Bowel sounds are normal.      Palpations: Abdomen is soft.      Hernia: No hernia is present.   Musculoskeletal:         General: Normal range of motion.      Cervical back: Normal range of motion and neck supple.      Right lower leg: No edema.      Left lower leg: No edema.   Lymphadenopathy:      Cervical: No cervical adenopathy.   Skin:     General: Skin is warm and dry.      Coloration: Skin is not jaundiced.      Findings: No lesion.   Neurological:      General: No focal deficit present.      Mental Status: He is alert and oriented to person, place, and time.      Cranial Nerves: No cranial nerve deficit.      Gait: Gait normal.   Psychiatric:         Mood and Affect: Mood normal.         Behavior: Behavior normal.         Judgment: Judgment  normal.         Assessment and Plan     Problem List Items Addressed This Visit    None      Plan:       I have reviewed the medications, allergies, and problem list.     Goal Actions:    What type of visit is the patient here for today?: Healthy You  Does the patient consent to enroll in [facility] Healthy?: Yes  Is this a Wellness Follow Up?: Yes  What is your overall wellness goal? (select at least one): Lifestyle modifications  Choose 3: Biometric, Nutrition, Exercise  Biometric Actions: Attend regularly scheduled office visits  Nurtrition Actions: Avoid adding table salt  Exercise Actions: Recommend physical activity 30 minutes per day 3-5 times/week

## 2024-03-19 ENCOUNTER — PATIENT OUTREACH (OUTPATIENT)
Dept: ADMINISTRATIVE | Facility: HOSPITAL | Age: 64
End: 2024-03-19

## 2024-03-19 NOTE — PROGRESS NOTES
Population Health Chart Review & Patient Outreach Details      Further Action Needed If Patient Returns Outreach:            Updates Requested / Reviewed:     []  Care Everywhere    []     []  External Sources ([facility], [facility], [facility], etc.)    [] [facility]   [] [facility]   [] Other:    []  Care Team Updated   []  Removed  or Duplicate Orders   []  Immunization Reconciliation Completed / Queried    [] [place]   [] [place]   [] [place]   [] [place]      Health Maintenance Topics Addressed and Outreach Outcomes / Actions Taken:             Breast Cancer Screening []  Mammogram Order Placed    []  Mammogram Screening Scheduled    []  External Records Requested & Care Team Updated if Applicable    []  External Records Uploaded & Care Team Updated if Applicable    []  Pt Declined Scheduling Mammogram    []  Pt Will Schedule with External Provider / Order Routed & Care Team Updated if Applicable              Cervical Cancer Screening []  Pap Smear Scheduled in Primary Care or OBGYN    []  External Records Requested & Care Team Updated if Applicable       []  External Records Uploaded, Care Team Updated, & History Updated if Applicable    []  Patient Declined Scheduling Pap Smear    []  Patient Will Schedule with External Provider & Care Team Updated if Applicable                  Colorectal Cancer Screening []  Colonoscopy Case Request / Referral / Home Test Order Placed    []  External Records Requested & Care Team Updated if Applicable    []  External Records Uploaded, Care Team Updated, & History Updated if Applicable    []  Patient Declined Completing Colon Cancer Screening    []  Patient Will Schedule with External Provider & Care Team Updated if Applicable    []  Fit Kit Mailed (add the SmartPhrase under additional notes)    []  Reminded Patient to Complete Home Test                Diabetic Eye Exam []  Eye Exam Screening Order Placed    []  Eye Camera Scheduled or Optometry/Ophthalmology Referral  Placed    []  External Records Requested & Care Team Updated if Applicable    []  External Records Uploaded, Care Team Updated, & History Updated if Applicable    []  Patient Declined Scheduling Eye Exam    []  Patient Will Schedule with External Provider & Care Team Updated if Applicable             Blood Pressure Control []  Primary Care Follow Up Visit Scheduled     []  Remote Blood Pressure Reading Captured    []  Patient Declined Remote Reading or Scheduling Appt - Escalated to PCP    []  Patient Will Call Back or Send Portal Message with Reading                 HbA1c & Other Labs []  Overdue Lab(s) Ordered    []  Overdue Lab(s) Scheduled    []  External Records Uploaded & Care Team Updated if Applicable    []  Primary Care Follow Up Visit Scheduled     []  Reminded Patient to Complete A1c Home Test    []  Patient Declined Scheduling Labs or Will Call Back to Schedule    []  Patient Will Schedule with External Provider / Order Routed, & Care Team Updated if Applicable           Primary Care Appointment []  Primary Care Appt Scheduled    []  Patient Declined Scheduling or Will Call Back to Schedule    []  Pt Established with External Provider, Updated Care Team, & Informed Pt to Notify Payor if Applicable           Medication Adherence /    Statin Use []  Primary Care Appointment Scheduled    []  Patient Reminded to  Prescription    []  Patient Declined, Provider Notified if Needed    []  Sent Provider Message to Review to Evaluate Pt for Statin, Add Exclusion Dx Codes, Document   Exclusion in Problem List, Change Statin Intensity Level to Moderate or High Intensity if Applicable                Osteoporosis Screening []  Dexa Order Placed    []  Dexa Appointment Scheduled    []  External Records Requested & Care Team Updated    []  External Records Uploaded, Care Team Updated, & History Updated if Applicable    []  Patient Declined Scheduling Dexa or Will Call Back to Schedule    []  Patient Will Schedule  with External Provider / Order Routed & Care Team Updated if Applicable       Additional Notes:.  Post visit Population Health review of encounter with date of service  3/18/24 with Nissa.  Not All required HY components in encounter. Chart is opened and needs z00.00 or z00.01 added and used as primary dx, No Hy labs included no labs are listed on appt notes.  Followup appt for: 2/26/25 HY

## 2024-04-08 DIAGNOSIS — Z12.11 COLON CANCER SCREENING: Primary | ICD-10-CM

## 2024-04-08 RX ORDER — POLYETHYLENE GLYCOL 3350, SODIUM SULFATE ANHYDROUS, SODIUM BICARBONATE, SODIUM CHLORIDE, POTASSIUM CHLORIDE 236; 22.74; 6.74; 5.86; 2.97 G/4L; G/4L; G/4L; G/4L; G/4L
4 POWDER, FOR SOLUTION ORAL ONCE
Qty: 4000 ML | Refills: 0 | Status: SHIPPED | OUTPATIENT
Start: 2024-04-08 | End: 2024-04-08

## 2024-07-29 ENCOUNTER — OFFICE VISIT (OUTPATIENT)
Dept: FAMILY MEDICINE | Facility: CLINIC | Age: 64
End: 2024-07-29
Payer: COMMERCIAL

## 2024-07-29 VITALS
OXYGEN SATURATION: 97 % | WEIGHT: 147 LBS | DIASTOLIC BLOOD PRESSURE: 86 MMHG | BODY MASS INDEX: 20.58 KG/M2 | HEART RATE: 80 BPM | RESPIRATION RATE: 18 BRPM | SYSTOLIC BLOOD PRESSURE: 132 MMHG | HEIGHT: 71 IN | TEMPERATURE: 98 F

## 2024-07-29 DIAGNOSIS — R05.9 COUGH, UNSPECIFIED TYPE: ICD-10-CM

## 2024-07-29 DIAGNOSIS — R06.02 SOB (SHORTNESS OF BREATH): ICD-10-CM

## 2024-07-29 DIAGNOSIS — J22 LOWER RESPIRATORY INFECTION: Primary | ICD-10-CM

## 2024-07-29 PROBLEM — D50.9 IRON DEFICIENCY ANEMIA: Status: ACTIVE | Noted: 2024-01-02

## 2024-07-29 PROBLEM — E83.39 HYPOPHOSPHATEMIA: Status: ACTIVE | Noted: 2024-01-02

## 2024-07-29 PROBLEM — I25.2 HX OF MYOCARDIAL INFARCTION: Status: ACTIVE | Noted: 2024-01-01

## 2024-07-29 PROBLEM — J98.4 CAVITARY PNEUMONIA: Status: ACTIVE | Noted: 2023-08-30

## 2024-07-29 PROBLEM — I10 ESSENTIAL HYPERTENSION: Status: ACTIVE | Noted: 2024-01-02

## 2024-07-29 PROBLEM — E83.52 HYPERCALCEMIA: Status: ACTIVE | Noted: 2024-01-03

## 2024-07-29 PROBLEM — E87.6 HYPOKALEMIA: Status: ACTIVE | Noted: 2024-01-02

## 2024-07-29 PROBLEM — J18.9 CAVITARY PNEUMONIA: Status: ACTIVE | Noted: 2023-08-30

## 2024-07-29 LAB
CTP QC/QA: YES
MOLECULAR STREP A: NEGATIVE
POC MOLECULAR INFLUENZA A AGN: NEGATIVE
POC MOLECULAR INFLUENZA B AGN: NEGATIVE
SARS-COV-2 RDRP RESP QL NAA+PROBE: NEGATIVE

## 2024-07-29 PROCEDURE — 87635 SARS-COV-2 COVID-19 AMP PRB: CPT | Mod: QW,,, | Performed by: NURSE PRACTITIONER

## 2024-07-29 PROCEDURE — 99214 OFFICE O/P EST MOD 30 MIN: CPT | Mod: 25,,, | Performed by: NURSE PRACTITIONER

## 2024-07-29 PROCEDURE — 3008F BODY MASS INDEX DOCD: CPT | Mod: ,,, | Performed by: NURSE PRACTITIONER

## 2024-07-29 PROCEDURE — 4010F ACE/ARB THERAPY RXD/TAKEN: CPT | Mod: ,,, | Performed by: NURSE PRACTITIONER

## 2024-07-29 PROCEDURE — 3079F DIAST BP 80-89 MM HG: CPT | Mod: ,,, | Performed by: NURSE PRACTITIONER

## 2024-07-29 PROCEDURE — 3075F SYST BP GE 130 - 139MM HG: CPT | Mod: ,,, | Performed by: NURSE PRACTITIONER

## 2024-07-29 PROCEDURE — 96372 THER/PROPH/DIAG INJ SC/IM: CPT | Mod: ,,, | Performed by: NURSE PRACTITIONER

## 2024-07-29 PROCEDURE — 1159F MED LIST DOCD IN RCRD: CPT | Mod: ,,, | Performed by: NURSE PRACTITIONER

## 2024-07-29 PROCEDURE — 1160F RVW MEDS BY RX/DR IN RCRD: CPT | Mod: ,,, | Performed by: NURSE PRACTITIONER

## 2024-07-29 PROCEDURE — 87651 STREP A DNA AMP PROBE: CPT | Mod: QW,,, | Performed by: NURSE PRACTITIONER

## 2024-07-29 PROCEDURE — 87502 INFLUENZA DNA AMP PROBE: CPT | Mod: QW,,, | Performed by: NURSE PRACTITIONER

## 2024-07-29 RX ORDER — DEXAMETHASONE SODIUM PHOSPHATE 4 MG/ML
4 INJECTION, SOLUTION INTRA-ARTICULAR; INTRALESIONAL; INTRAMUSCULAR; INTRAVENOUS; SOFT TISSUE
Status: COMPLETED | OUTPATIENT
Start: 2024-07-29 | End: 2024-07-29

## 2024-07-29 RX ORDER — LEVOFLOXACIN 500 MG/1
500 TABLET, FILM COATED ORAL DAILY
Qty: 10 TABLET | Refills: 0 | Status: SHIPPED | OUTPATIENT
Start: 2024-07-29 | End: 2024-08-08

## 2024-07-29 RX ADMIN — DEXAMETHASONE SODIUM PHOSPHATE 4 MG: 4 INJECTION, SOLUTION INTRA-ARTICULAR; INTRALESIONAL; INTRAMUSCULAR; INTRAVENOUS; SOFT TISSUE at 01:07

## 2024-07-29 NOTE — PATIENT INSTRUCTIONS
Steroid shot given in clinic   [facility]   I will call with chest x-ray results   Go the emergency room with any worsening

## 2024-07-29 NOTE — PROGRESS NOTES
"Subjective:       Patient ID: Garett Banegas is a 64 y.o. male.    Chief Complaint: Cough, Generalized Body Aches, Shortness of Breath, and Sinus Problem (Chest congestion)     Presents to clinic as above.  Reports history of COPD.  Reports coughing up brownish sputum.  Denies fever.  Has chronic shortness of breath.      Review of Systems   Constitutional: Negative.    HENT:  Positive for congestion and sinus pain.    Respiratory:  Positive for cough and sputum production. Negative for hemoptysis, shortness of breath and wheezing.    Cardiovascular: Negative.    Gastrointestinal: Negative.    Neurological:  Positive for headaches.          Reviewed family, medical, surgical, and social history.    Objective:      /86 (BP Location: Left arm, Patient Position: Sitting, BP Method: Medium (Automatic))   Pulse 80   Temp 98.4 °F (36.9 °C)   Resp 18   Ht 5' 11" (1.803 m)   Wt 66.7 kg (147 lb)   SpO2 97%   BMI 20.50 kg/m²   Physical Exam  Vitals and nursing note reviewed.   Constitutional:       General: He is not in acute distress.     Appearance: Normal appearance. He is normal weight. He is not ill-appearing, toxic-appearing or diaphoretic.   HENT:      Head: Normocephalic.      Right Ear: Hearing, tympanic membrane, ear canal and external ear normal.      Left Ear: Hearing, tympanic membrane, ear canal and external ear normal.      Nose: Mucosal edema, congestion and rhinorrhea present. Rhinorrhea is clear.      Right Turbinates: Enlarged and swollen.      Left Turbinates: Enlarged and swollen.      Right Sinus: No maxillary sinus tenderness or frontal sinus tenderness.      Left Sinus: No maxillary sinus tenderness or frontal sinus tenderness.      Mouth/Throat:      Lips: Pink.      Mouth: Mucous membranes are moist.      Pharynx: Uvula midline. No pharyngeal swelling, oropharyngeal exudate, posterior oropharyngeal erythema or uvula swelling.      Tonsils: No tonsillar exudate or tonsillar abscesses. "   Cardiovascular:      Rate and Rhythm: Normal rate and regular rhythm.      Heart sounds: Normal heart sounds.   Pulmonary:      Effort: Pulmonary effort is normal. No respiratory distress.      Breath sounds: No stridor. Wheezing present. No rhonchi or rales.   Chest:      Chest wall: No tenderness.   Musculoskeletal:      Cervical back: Normal range of motion and neck supple. No rigidity or tenderness.   Lymphadenopathy:      Cervical: No cervical adenopathy.   Skin:     General: Skin is warm and dry.   Neurological:      Mental Status: He is alert.   Psychiatric:         Mood and Affect: Mood normal.         Behavior: Behavior normal.         Thought Content: Thought content normal.         Judgment: Judgment normal.            Office Visit on 07/29/2024   Component Date Value Ref Range Status    POC Rapid COVID 07/29/2024 Negative  Negative Final     Acceptable 07/29/2024 Yes   Final    POC Molecular Influenza A Ag 07/29/2024 Negative  Negative Final    POC Molecular Influenza B Ag 07/29/2024 Negative  Negative Final     Acceptable 07/29/2024 Yes   Final    Molecular Strep A, POC 07/29/2024 Negative  Negative Final     Acceptable 07/29/2024 Yes   Final      Assessment:       1. Lower respiratory infection    2. Cough, unspecified type    3. SOB (shortness of breath)        Plan:       Lower respiratory infection  -     dexAMETHasone injection 4 mg  -     levoFLOXacin (LEVAQUIN) 500 MG tablet; Take 1 tablet (500 mg total) by mouth once daily. for 10 days  Dispense: 10 tablet; Refill: 0    Cough, unspecified type  -     POCT COVID-19 Rapid Screening  -     POCT Influenza A/B Molecular  -     POCT Strep A, Molecular  -     X-Ray Chest PA And Lateral; Future; Expected date: 07/29/2024    SOB (shortness of breath)  -     X-Ray Chest PA And Lateral; Future; Expected date: 07/29/2024    Steroid shot given in clinic   Levaquin sent to the pharmacy   I will call with chest  x-ray results   Go the emergency room with any worsening          Risks, benefits, and side effects were discussed with the patient. All questions were answered to the fullest satisfaction of the patient, and pt verbalized understanding and agreement to treatment plan. Pt was to call with any new or worsening symptoms, or present to the ER.

## 2024-08-29 DIAGNOSIS — M54.59 OTHER LOW BACK PAIN: Primary | ICD-10-CM

## 2024-10-25 ENCOUNTER — CLINICAL SUPPORT (OUTPATIENT)
Dept: REHABILITATION | Facility: HOSPITAL | Age: 64
End: 2024-10-25
Payer: OTHER MISCELLANEOUS

## 2024-10-25 DIAGNOSIS — M54.59 OTHER LOW BACK PAIN: ICD-10-CM

## 2024-10-25 PROCEDURE — 97750 PHYSICAL PERFORMANCE TEST: CPT

## 2024-10-25 NOTE — PLAN OF CARE
Client has completed FCE. Please see media for scanned FCE evaluation.   Thank you,   Daphne BART, MTC

## 2024-10-28 PROBLEM — J18.9 CAVITARY PNEUMONIA: Status: RESOLVED | Noted: 2023-08-30 | Resolved: 2024-10-28

## 2024-10-28 PROBLEM — J98.4 CAVITARY PNEUMONIA: Status: RESOLVED | Noted: 2023-08-30 | Resolved: 2024-10-28

## 2024-11-05 DIAGNOSIS — Z12.11 COLON CANCER SCREENING: Primary | ICD-10-CM

## 2024-11-06 RX ORDER — POLYETHYLENE GLYCOL 3350, SODIUM SULFATE ANHYDROUS, SODIUM BICARBONATE, SODIUM CHLORIDE, POTASSIUM CHLORIDE 236; 22.74; 6.74; 5.86; 2.97 G/4L; G/4L; G/4L; G/4L; G/4L
4 POWDER, FOR SOLUTION ORAL ONCE
Qty: 4000 ML | Refills: 0 | Status: SHIPPED | OUTPATIENT
Start: 2024-11-06 | End: 2024-11-06

## 2024-11-12 ENCOUNTER — ANESTHESIA EVENT (OUTPATIENT)
Dept: GASTROENTEROLOGY | Facility: HOSPITAL | Age: 64
End: 2024-11-12
Payer: COMMERCIAL

## 2024-11-12 ENCOUNTER — ANESTHESIA (OUTPATIENT)
Dept: GASTROENTEROLOGY | Facility: HOSPITAL | Age: 64
End: 2024-11-12
Payer: COMMERCIAL

## 2024-11-12 ENCOUNTER — HOSPITAL ENCOUNTER (OUTPATIENT)
Dept: GASTROENTEROLOGY | Facility: HOSPITAL | Age: 64
Discharge: HOME | End: 2024-11-12
Attending: FAMILY MEDICINE | Admitting: INTERNAL MEDICINE
Payer: COMMERCIAL

## 2024-11-12 VITALS
DIASTOLIC BLOOD PRESSURE: 66 MMHG | RESPIRATION RATE: 15 BRPM | WEIGHT: 138 LBS | OXYGEN SATURATION: 98 % | TEMPERATURE: 98 F | HEART RATE: 95 BPM | SYSTOLIC BLOOD PRESSURE: 102 MMHG | BODY MASS INDEX: 19.76 KG/M2 | HEIGHT: 70 IN

## 2024-11-12 DIAGNOSIS — Z12.11 COLON CANCER SCREENING: ICD-10-CM

## 2024-11-12 PROCEDURE — 37000009 HC ANESTHESIA EA ADD 15 MINS

## 2024-11-12 PROCEDURE — 37000008 HC ANESTHESIA 1ST 15 MINUTES

## 2024-11-12 PROCEDURE — 63600175 PHARM REV CODE 636 W HCPCS: Performed by: NURSE ANESTHETIST, CERTIFIED REGISTERED

## 2024-11-12 PROCEDURE — D9220A PRA ANESTHESIA: Mod: ,,, | Performed by: NURSE ANESTHETIST, CERTIFIED REGISTERED

## 2024-11-12 RX ORDER — SODIUM CHLORIDE, SODIUM LACTATE, POTASSIUM CHLORIDE, CALCIUM CHLORIDE 600; 310; 30; 20 MG/100ML; MG/100ML; MG/100ML; MG/100ML
INJECTION, SOLUTION INTRAVENOUS CONTINUOUS
Status: DISCONTINUED | OUTPATIENT
Start: 2024-11-12 | End: 2024-11-13 | Stop reason: HOSPADM

## 2024-11-12 RX ORDER — SODIUM CHLORIDE 0.9 % (FLUSH) 0.9 %
10 SYRINGE (ML) INJECTION EVERY 6 HOURS PRN
Status: DISCONTINUED | OUTPATIENT
Start: 2024-11-12 | End: 2024-11-13 | Stop reason: HOSPADM

## 2024-11-12 RX ORDER — PROPOFOL 10 MG/ML
VIAL (ML) INTRAVENOUS
Status: DISCONTINUED | OUTPATIENT
Start: 2024-11-12 | End: 2024-11-12

## 2024-11-12 RX ORDER — LIDOCAINE HYDROCHLORIDE 20 MG/ML
INJECTION, SOLUTION EPIDURAL; INFILTRATION; INTRACAUDAL; PERINEURAL
Status: DISCONTINUED | OUTPATIENT
Start: 2024-11-12 | End: 2024-11-12

## 2024-11-12 RX ADMIN — LIDOCAINE HYDROCHLORIDE 100 MG: 20 INJECTION, SOLUTION INTRAVENOUS at 08:11

## 2024-11-12 RX ADMIN — PROPOFOL 40 MG: 10 INJECTION, EMULSION INTRAVENOUS at 08:11

## 2024-11-12 RX ADMIN — PROPOFOL 50 MG: 10 INJECTION, EMULSION INTRAVENOUS at 08:11

## 2024-11-12 RX ADMIN — PROPOFOL 30 MG: 10 INJECTION, EMULSION INTRAVENOUS at 08:11

## 2024-11-12 RX ADMIN — PROPOFOL 50 MG: 10 INJECTION, EMULSION INTRAVENOUS at 09:11

## 2024-11-12 NOTE — ANESTHESIA POSTPROCEDURE EVALUATION
Anesthesia Post Evaluation    Patient: Garett Banegas    Procedure(s) Performed: colonoscopy  Final Anesthesia Type: MAC      Patient location during evaluation: GI PACU  Patient participation: Yes- Able to Participate  Level of consciousness: awake and alert  Post-procedure vital signs: reviewed and stable  Pain management: adequate  Airway patency: patent  RACHEL mitigation strategies: Multimodal analgesia  PONV status at discharge: No PONV  Anesthetic complications: no      Cardiovascular status: hemodynamically stable and blood pressure returned to baseline  Respiratory status: spontaneous ventilation and room air  Hydration status: euvolemic  Follow-up not needed.  Comments: Refer to nursing note for pain/zan score upon discharge              Vitals Value Taken Time   /72 11/12/24 0918   Temp 36.7 °C (98.1 °F) 11/12/24 0904   Pulse 92 11/12/24 0939   Resp 24 11/12/24 0921   SpO2 72 % 11/12/24 0939   Vitals shown include unfiled device data.      Event Time   Out of Recovery 09:39:18         Pain/Zan Score: Zan Score: 10 (11/12/2024  9:32 AM)

## 2024-11-12 NOTE — ANESTHESIA PREPROCEDURE EVALUATION
11/12/2024  Garett Banegas is a 64 y.o., male.      Pre-op Assessment    I have reviewed the Patient Summary Reports.    I have reviewed the NPO Status.   I have reviewed the Medications.     Review of Systems  Anesthesia Hx:  No problems with previous Anesthesia             Denies Family Hx of Anesthesia complications.    Denies Personal Hx of Anesthesia complications.                    Social:  Smoker       Cardiovascular:     Hypertension  Past MI CAD   CABG/stent        hyperlipidemia    Plavix held 3-4 days                           Pulmonary:   COPD Asthma                    Hepatic/GI:  Bowel Prep.                   Neurological:    Neuromuscular Disease,             Chronic Pain Syndrome                         Endocrine:  Endocrine Normal            Psych:  Psychiatric Normal                    Physical Exam  General: Well nourished    Airway:  Mallampati: II   Mouth Opening: Normal  TM Distance: Normal  Tongue: Normal  Neck ROM: Normal ROM    Dental:  Intact        Anesthesia Plan  Type of Anesthesia, risks & benefits discussed:    Anesthesia Type: MAC  Intra-op Monitoring Plan: Standard ASA Monitors  Post Op Pain Control Plan: multimodal analgesia  Induction:  IV  Informed Consent: Informed consent signed with the Patient and all parties understand the risks and agree with anesthesia plan.  All questions answered. Patient consented to blood products? Yes  ASA Score: 3  Day of Surgery Review of History & Physical: H&P Update referred to the surgeon/provider.I have interviewed and examined the patient. I have reviewed the patient's H&P dated: There are no significant changes.     Ready For Surgery From Anesthesia Perspective.     .    Past Medical History:   Diagnosis Date    Asthma     CAD (coronary artery disease)     Compressed cervical disc 07/2023    COPD (chronic obstructive pulmonary  disease)     Hyperlipidemia     Hypertension     Low back pain     Tobacco dependence due to cigarettes        Past Surgical History:   Procedure Laterality Date    APPENDECTOMY      BACK SURGERY      x3    CARDIAC SURGERY      HAND SURGERY      LEFT HEART CATHETERIZATION      ROTATOR CUFF REPAIR Right     SINUS SURGERY         Family History   Problem Relation Name Age of Onset    Cancer Other      Diabetes Other      Heart disease Other      Hypertension Other      Stroke Other      Epilepsy Other         Social History     Socioeconomic History    Marital status:    Tobacco Use    Smoking status: Every Day     Passive exposure: Past    Smokeless tobacco: Never   Substance and Sexual Activity    Alcohol use: Yes    Drug use: Never    Sexual activity: Yes     Social Drivers of Health     Financial Resource Strain: Not At Risk (3/31/2024)    Received from [facility]    BOC Financial Resource Needs     Financial Resource Strain: 1   Transportation Needs: Not At Risk (3/31/2024)    Received from [facility]    BOC Transportation Needs     Transportation Needs: 1   Physical Activity: Sufficiently Active (10/13/2023)    Exercise Vital Sign     Days of Exercise per Week: 6 days     Minutes of Exercise per Session: 60 min   Stress: No Stress Concern Present (10/13/2023)    [employer] [place] [employer] - Occupational Stress Questionnaire     Feeling of Stress : Only a little   Housing Stability: Not At Risk (3/31/2024)    Received from [facility]    BOC Housing Stability Source     Housing Insecurity: 1       Current Outpatient Medications   Medication Sig Dispense Refill    albuterol (PROVENTIL HFA) 90 mcg/actuation inhaler Inhale 2 puffs into the lungs every 6 (six) hours as needed for Wheezing. Rescue 18 g 11    amLODIPine (NORVASC) 5 MG tablet       aspirin (ECOTRIN) 81 MG EC tablet Take 81 mg by mouth once  daily.      benzonatate (TESSALON) 100 MG capsule Take 1 capsule (100 mg total) by mouth 3 (three) times daily as needed for Cough. 20 capsule 0    budesonide (PULMICORT FLEXHALER) 90 mcg/actuation AePB Inhale 2 puffs (180 mcg total) into the lungs 2 (two) times a day. Controller 1 each 2    budesonide-formoterol 160-4.5 mcg (SYMBICORT) 160-4.5 mcg/actuation HFAA Inhale 2 puffs into the lungs.      carvediloL (COREG) 6.25 MG tablet Take 1 tablet by mouth once daily.      clopidogreL (PLAVIX) 75 mg tablet Take by mouth.      fluticasone-umeclidin-vilanter (TRELEGY ELLIPTA) 100-62.5-25 mcg DsDv Inhale 1 puff into the lungs once daily. 60 each 11    irbesartan (AVAPRO) 150 MG tablet       losartan (COZAAR) 50 MG tablet Take 50 mg by mouth once daily.      morphine (MS CONTIN) 30 MG 12 hr tablet Take 30 mg by mouth every 12 (twelve) hours.      nicotine (NICODERM CQ) 21 mg/24 hr Place 1 patch onto the skin once daily. 30 patch 1    rosuvastatin (CRESTOR) 20 MG tablet Take 1 tablet (20 mg total) by mouth once daily. 90 tablet 3    SPIRIVA RESPIMAT 2.5 mcg/actuation inhaler Inhale 2 puffs into the lungs.       No current facility-administered medications for this encounter.       Review of patient's allergies indicates:   Allergen Reactions    Penicillins Rash     Other reaction(s): Hives, Unknown      Outpatient Medications as of 11/12/2024   Medication Sig Dispense Refill    albuterol (PROVENTIL HFA) 90 mcg/actuation inhaler Inhale 2 puffs into the lungs every 6 (six) hours as needed for Wheezing. Rescue 18 g 11    amLODIPine (NORVASC) 5 MG tablet       aspirin (ECOTRIN) 81 MG EC tablet Take 81 mg by mouth once daily.      benzonatate (TESSALON) 100 MG capsule Take 1 capsule (100 mg total) by mouth 3 (three) times daily as needed for Cough. 20 capsule 0    budesonide (PULMICORT FLEXHALER) 90 mcg/actuation AePB Inhale 2 puffs (180 mcg total) into the lungs 2 (two) times a day. Controller 1 each 2    budesonide-formoterol  160-4.5 mcg (SYMBICORT) 160-4.5 mcg/actuation HFAA Inhale 2 puffs into the lungs.      carvediloL (COREG) 6.25 MG tablet Take 1 tablet by mouth once daily.      clopidogreL (PLAVIX) 75 mg tablet Take by mouth.      fluticasone-umeclidin-vilanter (TRELEGY ELLIPTA) 100-62.5-25 mcg DsDv Inhale 1 puff into the lungs once daily. 60 each 11    irbesartan (AVAPRO) 150 MG tablet       losartan (COZAAR) 50 MG tablet Take 50 mg by mouth once daily.      morphine (MS CONTIN) 30 MG 12 hr tablet Take 30 mg by mouth every 12 (twelve) hours.      nicotine (NICODERM CQ) 21 mg/24 hr Place 1 patch onto the skin once daily. 30 patch 1    rosuvastatin (CRESTOR) 20 MG tablet Take 1 tablet (20 mg total) by mouth once daily. 90 tablet 3    SPIRIVA RESPIMAT 2.5 mcg/actuation inhaler Inhale 2 puffs into the lungs.       No current facility-administered medications on file as of 11/12/2024.        Chemistry        Component Value Date/Time     10/13/2023 0328    K 3.7 10/13/2023 0328     (H) 10/13/2023 0328    CO2 25 10/13/2023 0328    BUN 11 10/13/2023 0328    CREATININE 0.91 10/13/2023 0328     (H) 10/13/2023 0328        Component Value Date/Time    CALCIUM 8.6 10/13/2023 0328    ALKPHOS 106 10/13/2023 0328    AST 21 10/13/2023 0328    ALT 31 10/13/2023 0328    BILITOT 0.2 10/13/2023 0328    EGFRNONAA 79 07/14/2021 1417        Lab Results   Component Value Date    WBC 6.84 10/13/2023    HGB 12.7 (L) 10/13/2023    HCT 36.9 (L) 10/13/2023     10/13/2023     No results found for this or any previous visit.

## 2024-11-12 NOTE — TRANSFER OF CARE
"Anesthesia Transfer of Care Note    Patient: Garett Banegas    Procedure(s) Performed: Colonoscopy     Patient location: GI    Anesthesia Type: MAC    Transport from OR: Transported from OR on room air with adequate spontaneous ventilation. Continuous ECG monitoring in transport. Continuous SpO2 monitoring in transport    Post pain: adequate analgesia    Post assessment: no apparent anesthetic complications    Post vital signs: stable    Level of consciousness: responds to stimulation, awake and sedated    Nausea/Vomiting: no nausea/vomiting    Complications: none    Transfer of care protocol was followed      Last vitals: Visit Vitals  /64   Pulse 85   Temp 36.7 °C (98.1 °F)   Resp 14   Ht 5' 10" (1.778 m)   Wt 62.6 kg (138 lb)   SpO2 97%   BMI 19.80 kg/m²     "

## 2024-11-12 NOTE — H&P
History & Physical - Short Stay  Gastroenterology      SUBJECTIVE:     Procedure: Colonoscopy    Chief Complaint/Indication for Procedure: Screening    (Not in a hospital admission)      Review of patient's allergies indicates:   Allergen Reactions    Penicillins Rash     Other reaction(s): Hives, Unknown        Past Medical History:   Diagnosis Date    Asthma     CAD (coronary artery disease)     Compressed cervical disc 07/2023    COPD (chronic obstructive pulmonary disease)     Hyperlipidemia     Hypertension     Low back pain     Tobacco dependence due to cigarettes      Past Surgical History:   Procedure Laterality Date    APPENDECTOMY      BACK SURGERY      x3    CARDIAC SURGERY      HAND SURGERY      LEFT HEART CATHETERIZATION      ROTATOR CUFF REPAIR Right     SINUS SURGERY       Family History   Problem Relation Name Age of Onset    Cancer Other      Diabetes Other      Heart disease Other      Hypertension Other      Stroke Other      Epilepsy Other       Social History     Tobacco Use    Smoking status: Every Day     Passive exposure: Past    Smokeless tobacco: Never   Substance Use Topics    Alcohol use: Yes    Drug use: Never         OBJECTIVE:     Vital Signs (Most Recent)  Temp: 98 °F (36.7 °C) (11/12/24 0752)  Pulse: 84 (11/12/24 0752)  Resp: 16 (11/12/24 0752)  BP: (!) 157/105 (11/12/24 0752)  SpO2: 98 % (11/12/24 0752)    Physical Exam:                                                       GENERAL:  Comfortable, in no acute distress.                                 HEENT EXAM:  Nonicteric.  No adenopathy.  Oropharynx is clear.               NECK:  Supple.                                                               LUNGS:  Clear.                                                               CARDIAC:  Regular rate and rhythm.  S1, S2.  No murmur.                      ABDOMEN:  Soft, positive bowel sounds, nontender.  No hepatosplenomegaly or masses.  No rebound or guarding.                                              EXTREMITIES:  No edema.     MENTAL STATUS:  Normal, alert and oriented.      ASSESSMENT/PLAN:     Assessment: Screening    Plan: Colonoscopy

## 2024-11-12 NOTE — DISCHARGE INSTRUCTIONS
Procedure Date  11/12/24     Impression  Overall Impression:   Poor prep causing difficulty with visualization     Recommendation  - Repeat colonoscopy next available   - Discharge patient to home  - Advance diet as tolerated  - Continue present medications  - Await pathology results  - Patient has a contact number available for emergencies. The signs and symptoms of potential delayed complications were discussed with the patient. Return to normal activities tomorrow. Written discharge instructions were provided to the patient.     NO DRIVING, OPERATING EQUIPMENT, OR SIGNING LEGAL DOCUMENTS FOR 24 HOURS.THE NURSE WILL CALL YOU WITH YOUR BIOPSY RESULTS IN A FEW DAYS. IF YOU HAVE  [facility] MYCHART YOUR RESULTS WILL APPEAR THERE AS WELL.Please call the GI Lab if you have any nausea, vomiting, or abdominal pain.

## 2025-01-22 ENCOUNTER — TELEPHONE (OUTPATIENT)
Dept: ORTHOPEDICS | Facility: CLINIC | Age: 65
End: 2025-01-22
Payer: COMMERCIAL

## 2025-01-22 NOTE — TELEPHONE ENCOUNTER
----- Message from Christina sent at 1/21/2025  2:05 PM CST -----  Regarding: talk to a nurse  Who Called: Garett Banegas    Caller is requesting assistance/information from provider's office.    Symptoms (please be specific): would like to talk to a nurse about his referral        Preferred Method of Contact: Phone Call  Patient's Preferred Phone Number on File: [phone]   Best Call Back Number, if different:  Additional Information:

## 2025-01-27 ENCOUNTER — HOSPITAL ENCOUNTER (OUTPATIENT)
Dept: RADIOLOGY | Facility: HOSPITAL | Age: 65
Discharge: HOME | End: 2025-01-27
Payer: OTHER MISCELLANEOUS

## 2025-01-27 ENCOUNTER — OFFICE VISIT (OUTPATIENT)
Dept: ORTHOPEDICS | Facility: CLINIC | Age: 65
End: 2025-01-27
Payer: OTHER MISCELLANEOUS

## 2025-01-27 VITALS — HEIGHT: 70 IN | BODY MASS INDEX: 19.76 KG/M2 | WEIGHT: 138 LBS

## 2025-01-27 DIAGNOSIS — G89.29 CHRONIC RIGHT SHOULDER PAIN: Primary | ICD-10-CM

## 2025-01-27 DIAGNOSIS — M25.511 RIGHT SHOULDER PAIN, UNSPECIFIED CHRONICITY: ICD-10-CM

## 2025-01-27 DIAGNOSIS — M25.511 RIGHT SHOULDER PAIN, UNSPECIFIED CHRONICITY: Primary | ICD-10-CM

## 2025-01-27 DIAGNOSIS — M25.511 CHRONIC RIGHT SHOULDER PAIN: Primary | ICD-10-CM

## 2025-01-27 PROCEDURE — 73030 X-RAY EXAM OF SHOULDER: CPT | Mod: TC,RT

## 2025-01-27 PROCEDURE — 99203 OFFICE O/P NEW LOW 30 MIN: CPT | Mod: S$PBB,,,

## 2025-01-27 PROCEDURE — 99999 PR PBB SHADOW E&M-EST. PATIENT-LVL III: CPT | Mod: PBBFAC,,,

## 2025-01-27 PROCEDURE — 99213 OFFICE O/P EST LOW 20 MIN: CPT | Mod: PBBFAC,25

## 2025-01-27 NOTE — PROGRESS NOTES
CC:   Chief Complaint   Patient presents with    Shoulder Pain     Right shoulder. Patient is workers comp.       HPI     Shoulder Pain     Additional comments: Right shoulder. Patient is workers comp.           Last edited by Karen Varner CMA on 1/27/2025 10:12 AM.        Garett Banegas is a 64 y.o. male seen today for Shoulder Pain (Right shoulder. Patient is workers comp. )  Worker's comp.  Patient is now for history of chronic right shoulder pain.  He has previously had right shoulder arthroscopy by Dr. Coelho greater than 3 years ago.  He is currently seen by pain medicine for lower back problems.  Patient reports a fall proximally year and a half ago re-injuring his right shoulder.  He reports decreased range of motion and difficulty picking up objects due to right shoulder pain.  Also reports occasional numbness and tingling down his right arm.  He has been previously evaluated by Neurosurgery for back pain.  Currently taking morphine through pain management.  No other complaints today.      PAST MEDICAL HISTORY:   Past Medical History:   Diagnosis Date    Asthma     CAD (coronary artery disease)     Compressed cervical disc 07/2023    COPD (chronic obstructive pulmonary disease)     Hyperlipidemia     Hypertension     Low back pain     Tobacco dependence due to cigarettes           PAST SURGICAL HISTORY:   Past Surgical History:   Procedure Laterality Date    APPENDECTOMY      BACK SURGERY      x3    CARDIAC SURGERY      HAND SURGERY      LEFT HEART CATHETERIZATION      ROTATOR CUFF REPAIR Right     SINUS SURGERY            ALLERGIES:   Review of patient's allergies indicates:   Allergen Reactions    Penicillins Rash     Other reaction(s): Hives, Unknown        MEDICATIONS :    Current Outpatient Medications:     albuterol (PROVENTIL HFA) 90 mcg/actuation inhaler, Inhale 2 puffs into the lungs every 6 (six) hours as needed for Wheezing. Rescue, Disp: 18 g, Rfl: 11    amLODIPine  (NORVASC) 5 MG tablet, , Disp: , Rfl:     aspirin (ECOTRIN) 81 MG EC tablet, Take 81 mg by mouth once daily., Disp: , Rfl:     benzonatate (TESSALON) 100 MG capsule, Take 1 capsule (100 mg total) by mouth 3 (three) times daily as needed for Cough., Disp: 20 capsule, Rfl: 0    budesonide (PULMICORT FLEXHALER) 90 mcg/actuation AePB, Inhale 2 puffs (180 mcg total) into the lungs 2 (two) times a day. Controller, Disp: 1 each, Rfl: 2    budesonide-formoterol 160-4.5 mcg (SYMBICORT) 160-4.5 mcg/actuation HFAA, Inhale 2 puffs into the lungs., Disp: , Rfl:     carvediloL (COREG) 6.25 MG tablet, Take 1 tablet by mouth once daily., Disp: , Rfl:     clopidogreL (PLAVIX) 75 mg tablet, Take by mouth., Disp: , Rfl:     fluticasone-umeclidin-vilanter (TRELEGY ELLIPTA) 100-62.5-25 mcg DsDv, Inhale 1 puff into the lungs once daily., Disp: 60 each, Rfl: 11    irbesartan (AVAPRO) 150 MG tablet, , Disp: , Rfl:     losartan (COZAAR) 50 MG tablet, Take 50 mg by mouth once daily., Disp: , Rfl:     morphine (MS CONTIN) 30 MG 12 hr tablet, Take 30 mg by mouth every 12 (twelve) hours., Disp: , Rfl:     nicotine (NICODERM CQ) 21 mg/24 hr, Place 1 patch onto the skin once daily., Disp: 30 patch, Rfl: 1    rosuvastatin (CRESTOR) 20 MG tablet, Take 1 tablet (20 mg total) by mouth once daily., Disp: 90 tablet, Rfl: 3    SPIRIVA RESPIMAT 2.5 mcg/actuation inhaler, Inhale 2 puffs into the lungs., Disp: , Rfl:      SOCIAL HISTORY:   Social History     Socioeconomic History    Marital status:    Tobacco Use    Smoking status: Every Day     Passive exposure: Past    Smokeless tobacco: Never   Substance and Sexual Activity    Alcohol use: Yes    Drug use: Never    Sexual activity: Yes     Social Drivers of Health     Financial Resource Strain: Not At Risk (3/31/2024)    Received from [facility]    BOC Financial Resource Needs     Financial Resource Strain: 1   Food Insecurity: Not At Risk (3/31/2024)    Received  from [facility]    BOC Food Insecurity     Food Insecurity: Not At Risk   Transportation Needs: Not At Risk (3/31/2024)    Received from [facility]    BOC Transportation Needs     Transportation Needs: 1   Physical Activity: Sufficiently Active (10/13/2023)    Exercise Vital Sign     Days of Exercise per Week: 6 days     Minutes of Exercise per Session: 60 min   Stress: No Stress Concern Present (10/13/2023)    [employer] [place] [employer] - Occupational Stress Questionnaire     Feeling of Stress : Only a little   Housing Stability: Not At Risk (3/31/2024)    Received from [facility]    BOC Housing Stability Source     Housing Insecurity: 1        FAMILY HISTORY:   Family History   Problem Relation Name Age of Onset    Cancer Other      Diabetes Other      Heart disease Other      Hypertension Other      Stroke Other      Epilepsy Other            PHYSICAL EXAM:      There were no vitals filed for this visit.  Body mass index is 19.8 kg/m².    GENERAL: Well-developed, well-nourished male . The patient is alert, oriented and cooperative.    HEENT:  Normocephalic, atraumatic.  Extraocular movements are intact bilaterally.     NECK:  Nontender with good range of motion.    LUNGS:  Clear to auscultation bilaterally.    HEART:  Regular rate and rhythm.     ABDOMEN:  Soft, non-tender, non-distended.      EXTREMITIES:  Right shoulder with skin clean dry and intact, tenderness to palpation over AC joint, there is pain along anterior-posterior shoulder with range of motion, forward flexion no greater than 120° without pain, abduction to 90°, there is weakness noted on empty can testing along with the [place] testing, positive [place] testing, neurovascularly intact      RADIOGRAPHIC FINDINGS:   EXAMINATION:  XR SHOULDER COMPLETE 2 OR MORE VIEWS RIGHT     CLINICAL HISTORY:  Pain in right shoulder      TECHNIQUE:  Internal rotation AP, external rotation AP, and scapular Y views of the right shoulder were acquired.     COMPARISON:  Right shoulder-04/28/2015     FINDINGS:  There is degenerative inferomedial humeral spur formation at the surgical neck of the right humerus.  No fracture, dislocation, or osseous destructive process.  There is mild cephalad subluxation of the right humeral head.  A tear of the right rotator cuff tendon cannot be excluded on the basis of this examination.  Consider additional investigation with MRI of the right shoulder if there is a clinical concern of a right rotator cuff tendon tear.  No rotator cuff calcific tendinitis.  There are remote/healed right posterior 3rd-7th rib fractures.     Impression:     As above        Electronically signed by:Rob Roberts MD  Date:                                            01/28/2025  Time:                                           08:41    Patient Active Problem List    Diagnosis Date Noted    Hypercalcemia 01/03/2024    Essential hypertension 01/02/2024    Hypokalemia 01/02/2024    Hypophosphatemia 01/02/2024    Iron deficiency anemia 01/02/2024    Hx of myocardial infarction 01/01/2024    Pneumothorax after biopsy 10/12/2023    Tobacco dependence due to cigarettes 10/12/2023    COPD (chronic obstructive pulmonary disease) 10/12/2023    Coronary artery disease involving native coronary artery of native heart without angina pectoris 10/12/2023    Mixed hyperlipidemia 10/12/2023    SOB (shortness of breath) 09/18/2023    Compression fracture of body of thoracic vertebra 04/20/2022    Lumbar strain, subsequent encounter 04/06/2022    Thoracic myofascial strain 03/28/2022    Strain of lumbar region 03/28/2022    Rib contusion, right, initial encounter 02/08/2022     IMPRESSION AND PLAN:  Chronic right shoulder pain with previous fall year and a half ago.  Suspect rotator cuff injury with range of motion and pain.  Personally reviewed x-rays today  showing mild-to-moderate degenerative changes of both the AC and glenohumeral joints.  Recommending MRI for further evaluation.  We will call him with the MRI results and schedule him to follow up with Dr. Coelho.  Patient okay to return to work however no lifting pushing and pulling with the right upper extremity.    No follow-ups on file.       Poonam Byrd PA-C      (Subject to voice recognition error, transcription service not allowed)

## 2025-02-24 ENCOUNTER — HOSPITAL ENCOUNTER (OUTPATIENT)
Dept: RADIOLOGY | Facility: HOSPITAL | Age: 65
Discharge: HOME | End: 2025-02-24
Payer: COMMERCIAL

## 2025-02-24 DIAGNOSIS — M25.511 CHRONIC RIGHT SHOULDER PAIN: ICD-10-CM

## 2025-02-24 DIAGNOSIS — G89.29 CHRONIC RIGHT SHOULDER PAIN: ICD-10-CM

## 2025-03-04 ENCOUNTER — OFFICE VISIT (OUTPATIENT)
Dept: FAMILY MEDICINE | Facility: CLINIC | Age: 65
End: 2025-03-04
Payer: COMMERCIAL

## 2025-03-04 ENCOUNTER — TELEPHONE (OUTPATIENT)
Dept: ORTHOPEDICS | Facility: CLINIC | Age: 65
End: 2025-03-04
Payer: COMMERCIAL

## 2025-03-04 VITALS
HEART RATE: 86 BPM | DIASTOLIC BLOOD PRESSURE: 72 MMHG | WEIGHT: 136.63 LBS | SYSTOLIC BLOOD PRESSURE: 118 MMHG | RESPIRATION RATE: 16 BRPM | OXYGEN SATURATION: 97 % | TEMPERATURE: 98 F | BODY MASS INDEX: 19.56 KG/M2 | HEIGHT: 70 IN

## 2025-03-04 DIAGNOSIS — Z00.00 ROUTINE GENERAL MEDICAL EXAMINATION AT A HEALTH CARE FACILITY: ICD-10-CM

## 2025-03-04 DIAGNOSIS — Z12.5 PROSTATE CANCER SCREENING: Primary | ICD-10-CM

## 2025-03-04 DIAGNOSIS — E78.2 MIXED HYPERLIPIDEMIA: ICD-10-CM

## 2025-03-04 DIAGNOSIS — Z13.220 SCREENING FOR LIPOID DISORDERS: ICD-10-CM

## 2025-03-04 DIAGNOSIS — J42 CHRONIC BRONCHITIS, UNSPECIFIED CHRONIC BRONCHITIS TYPE: ICD-10-CM

## 2025-03-04 DIAGNOSIS — Z13.1 SCREENING FOR DIABETES MELLITUS: ICD-10-CM

## 2025-03-04 LAB
CHOLEST SERPL-MCNC: 144 MG/DL
CHOLEST/HDLC SERPL: 2.8 {RATIO}
EST. AVERAGE GLUCOSE BLD GHB EST-MCNC: 114 MG/DL
GLUCOSE SERPL-MCNC: 114 MG/DL (ref 70–100)
HBA1C MFR BLD HPLC: 5.6 %
HDLC SERPL-MCNC: 51 MG/DL (ref 35–60)
LDLC SERPL CALC-MCNC: 77 MG/DL
LDLC/HDLC SERPL: 1.5 {RATIO}
NONHDLC SERPL-MCNC: 93 MG/DL
PSA SERPL-MCNC: 0.96 NG/ML
TRIGL SERPL-MCNC: 78 MG/DL (ref 34–140)
VLDLC SERPL-MCNC: 16 MG/DL

## 2025-03-04 PROCEDURE — 3008F BODY MASS INDEX DOCD: CPT | Mod: ,,, | Performed by: FAMILY MEDICINE

## 2025-03-04 PROCEDURE — 99396 PREV VISIT EST AGE 40-64: CPT | Mod: ,,, | Performed by: FAMILY MEDICINE

## 2025-03-04 PROCEDURE — 3074F SYST BP LT 130 MM HG: CPT | Mod: ,,, | Performed by: FAMILY MEDICINE

## 2025-03-04 PROCEDURE — G0103 PSA SCREENING: HCPCS | Mod: ,,, | Performed by: CLINICAL MEDICAL LABORATORY

## 2025-03-04 PROCEDURE — 82947 ASSAY GLUCOSE BLOOD QUANT: CPT | Mod: ,,, | Performed by: CLINICAL MEDICAL LABORATORY

## 2025-03-04 PROCEDURE — 1159F MED LIST DOCD IN RCRD: CPT | Mod: ,,, | Performed by: FAMILY MEDICINE

## 2025-03-04 PROCEDURE — 80061 LIPID PANEL: CPT | Mod: ,,, | Performed by: CLINICAL MEDICAL LABORATORY

## 2025-03-04 PROCEDURE — 4010F ACE/ARB THERAPY RXD/TAKEN: CPT | Mod: ,,, | Performed by: FAMILY MEDICINE

## 2025-03-04 PROCEDURE — 83036 HEMOGLOBIN GLYCOSYLATED A1C: CPT | Mod: ,,, | Performed by: CLINICAL MEDICAL LABORATORY

## 2025-03-04 PROCEDURE — 3078F DIAST BP <80 MM HG: CPT | Mod: ,,, | Performed by: FAMILY MEDICINE

## 2025-03-04 RX ORDER — NALOXONE HYDROCHLORIDE 4 MG/.1ML
1 SPRAY NASAL
COMMUNITY
Start: 2024-09-03

## 2025-03-04 RX ORDER — CARVEDILOL 6.25 MG/1
6.25 TABLET ORAL DAILY
Qty: 90 TABLET | Refills: 3 | Status: SHIPPED | OUTPATIENT
Start: 2025-03-04

## 2025-03-04 RX ORDER — BUDESONIDE AND FORMOTEROL FUMARATE DIHYDRATE 160; 4.5 UG/1; UG/1
2 AEROSOL RESPIRATORY (INHALATION) EVERY 12 HOURS
Qty: 10.2 G | Refills: 11 | Status: SHIPPED | OUTPATIENT
Start: 2025-03-04 | End: 2025-03-04

## 2025-03-04 RX ORDER — BUDESONIDE AND FORMOTEROL FUMARATE 160; 4.5 UG/1; UG/1
2 AEROSOL, METERED RESPIRATORY (INHALATION) EVERY 12 HOURS
Qty: 10.2 G | Refills: 11 | Status: SHIPPED | OUTPATIENT
Start: 2025-03-04

## 2025-03-04 RX ORDER — CLOPIDOGREL BISULFATE 75 MG/1
75 TABLET ORAL DAILY
Qty: 90 TABLET | Refills: 3 | Status: SHIPPED | OUTPATIENT
Start: 2025-03-04

## 2025-03-04 RX ORDER — LOSARTAN POTASSIUM 50 MG/1
50 TABLET ORAL DAILY
Qty: 90 TABLET | Refills: 3 | Status: SHIPPED | OUTPATIENT
Start: 2025-03-04

## 2025-03-04 RX ORDER — ALBUTEROL SULFATE 90 UG/1
2 INHALANT RESPIRATORY (INHALATION) EVERY 6 HOURS PRN
Qty: 18 G | Refills: 11 | Status: SHIPPED | OUTPATIENT
Start: 2025-03-04

## 2025-03-04 RX ORDER — ROSUVASTATIN CALCIUM 20 MG/1
20 TABLET, COATED ORAL DAILY
Qty: 90 TABLET | Refills: 3 | Status: SHIPPED | OUTPATIENT
Start: 2025-03-04
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
## 2025-03-04 NOTE — PROGRESS NOTES
Subjective     Patient ID: Garett Banegas is a 64 y.o. male.    Chief Complaint: Healthy You (Z00.00)    HY. Routine followup.  No significant interval change.  R shoulder pain and chronic back pain.        Review of Systems   Constitutional:  Negative for activity change, appetite change, fatigue, fever and unexpected weight change.   HENT:  Negative for nasal congestion, dental problem, ear pain, hearing loss, mouth sores, nosebleeds, sore throat, tinnitus and voice change.    Eyes:  Negative for pain, discharge and visual disturbance.   Respiratory:  Negative for apnea, choking, chest tightness, shortness of breath and wheezing.    Cardiovascular:  Negative for chest pain, palpitations, leg swelling and claudication.   Gastrointestinal:  Negative for abdominal pain, blood in stool and change in bowel habit.   Endocrine: Negative for polydipsia, polyphagia and polyuria.   Genitourinary:  Negative for bladder incontinence, dysuria, erectile dysfunction, flank pain, genital sores and hematuria.   Musculoskeletal:  Positive for arthralgias and back pain. Negative for gait problem, neck pain and neck stiffness.   Integumentary:  Negative for rash, wound, mole/lesion, breast mass and breast discharge.   Allergic/Immunologic: Negative for food allergies.   Neurological:  Negative for seizures, syncope, headaches, memory loss and coordination difficulties.   Hematological:  Negative for adenopathy. Does not bruise/bleed easily.   Psychiatric/Behavioral:  Negative for agitation, behavioral problems, confusion, decreased concentration, hallucinations, self-injury, sleep disturbance and suicidal ideas. The patient is not nervous/anxious.    Breast: Negative for mass      Tobacco Use: High Risk (3/4/2025)    Patient History     Smoking Tobacco Use: Every Day     Smokeless Tobacco Use: Never     Passive Exposure: Past     Review of patient's allergies indicates:   Allergen Reactions    Penicillins Rash     Other  reaction(s): Hives, Unknown     Current Outpatient Medications   Medication Instructions    albuterol (PROVENTIL HFA) 90 mcg/actuation inhaler 2 puffs, Inhalation, Every 6 hours PRN, Rescue     amLODIPine (NORVASC) 5 MG tablet No dose, route, or frequency recorded.    aspirin (ECOTRIN) 81 mg, Daily    benzonatate (TESSALON) 100 mg, Oral, 3 times daily PRN    budesonide (PULMICORT FLEXHALER) 180 mcg, Inhalation, 2 times daily, Controller    budesonide-formoterol 160-4.5 mcg (SYMBICORT) 160-4.5 mcg/actuation HFAA 2 puffs    carvediloL (COREG) 6.25 MG tablet 1 tablet, Daily    clopidogreL (PLAVIX) 75 mg tablet Take by mouth.    fluticasone-umeclidin-vilanter (TRELEGY ELLIPTA) 100-62.5-25 mcg DsDv 1 puff, Inhalation, Daily    irbesartan (AVAPRO) 150 MG tablet No dose, route, or frequency recorded.    losartan (COZAAR) 50 mg, Daily    morphine (MS CONTIN) 30 mg, Every 12 hours    naloxone (NARCAN) 4 mg/actuation Spry 1 spray    nicotine (NICODERM CQ) 21 mg/24 hr 1 patch, Transdermal, Daily    rosuvastatin (CRESTOR) 20 mg, Oral, Daily    SPIRIVA RESPIMAT 2.5 mcg/actuation inhaler 2 puffs     There are no discontinued medications.    Past Medical History:   Diagnosis Date    Asthma     CAD (coronary artery disease)     Compressed cervical disc 07/2023    COPD (chronic obstructive pulmonary disease)     Hyperlipidemia     Hypertension     Low back pain     Tobacco dependence due to cigarettes      Health Maintenance Topics with due status: Not Due       Topic Last Completion Date    Lipid Panel 03/04/2024    High Dose Statin 07/29/2024    Aspirin/Antiplatelet Therapy 07/29/2024    Colorectal Cancer Screening 11/12/2024     Immunization History   Administered Date(s) Administered    Pneumococcal Conjugate - 20 Valent 03/18/2024       Objective     Body mass index is 19.6 kg/m².  Wt Readings from Last 3 Encounters:   03/04/25 62 kg (136 lb 9.6 oz)   01/27/25 62.6 kg (138 lb)   11/12/24 62.6 kg (138 lb)     Ht Readings from  "Last 3 Encounters:   03/04/25 5' 10" (1.778 m)   01/27/25 5' 10" (1.778 m)   11/12/24 5' 10" (1.778 m)     BP Readings from Last 3 Encounters:   03/04/25 (!) 140/90   11/12/24 102/66   07/29/24 132/86     Temp Readings from Last 3 Encounters:   03/04/25 98.4 °F (36.9 °C) (Oral)   11/12/24 98.1 °F (36.7 °C)   07/29/24 98.4 °F (36.9 °C)     Pulse Readings from Last 3 Encounters:   03/04/25 86   11/12/24 95   07/29/24 80     Resp Readings from Last 3 Encounters:   03/04/25 16   11/12/24 15   07/29/24 18     PF Readings from Last 3 Encounters:   10/23/23 543 L/min       Physical Exam  Constitutional:       General: He is not in acute distress.     Appearance: Normal appearance.   HENT:      Head: Normocephalic.      Right Ear: Tympanic membrane and ear canal normal.      Left Ear: Tympanic membrane and ear canal normal.      Nose: Nose normal.      Mouth/Throat:      Mouth: Mucous membranes are moist.      Pharynx: No oropharyngeal exudate.   Eyes:      Extraocular Movements: Extraocular movements intact.      Pupils: Pupils are equal, round, and reactive to light.   Cardiovascular:      Rate and Rhythm: Normal rate and regular rhythm.      Heart sounds: No murmur heard.  Pulmonary:      Effort: Pulmonary effort is normal.      Breath sounds: Normal breath sounds. No wheezing.   Abdominal:      General: Abdomen is flat. Bowel sounds are normal.      Palpations: Abdomen is soft.      Hernia: No hernia is present.   Musculoskeletal:         General: Tenderness and deformity present. Normal range of motion.      Cervical back: Normal range of motion and neck supple.      Right lower leg: No edema.      Left lower leg: No edema.   Lymphadenopathy:      Cervical: No cervical adenopathy.   Skin:     General: Skin is warm and dry.      Coloration: Skin is not jaundiced.      Findings: No lesion.   Neurological:      General: No focal deficit present.      Mental Status: He is alert and oriented to person, place, and time.      " Cranial Nerves: No cranial nerve deficit.      Gait: Gait normal.   Psychiatric:         Mood and Affect: Mood normal.         Behavior: Behavior normal.         Judgment: Judgment normal.         Assessment and Plan     Problem List Items Addressed This Visit          Pulmonary    COPD (chronic obstructive pulmonary disease)       Cardiac/Vascular    Mixed hyperlipidemia     Other Visit Diagnoses         Prostate cancer screening    -  Primary    Relevant Orders    PSA, Screening      Screening for diabetes mellitus        Relevant Orders    Glucose, Fasting    Hemoglobin A1C      Screening for lipoid disorders        Relevant Orders    Lipid Panel            Plan:       I have reviewed the medications, allergies, and problem list.     Goal Actions:    What type of visit is the patient here for today?: Healthy You  Does the patient consent to enroll in [facility] Healthy?: Yes  Is this a Wellness Follow Up?: Yes  What is your overall wellness goal? (select at least one): Lifestyle modifications  Choose 3: Biometric, Nutrition, Exercise  Biometric Actions: Attend regularly scheduled office visits  Nurtrition Actions: Avoid adding table salt  Exercise Actions: Recommend physical activity 30 minutes per day 3-5 times/week

## 2025-03-05 ENCOUNTER — TELEPHONE (OUTPATIENT)
Dept: GASTROENTEROLOGY | Facility: CLINIC | Age: 65
End: 2025-03-05
Payer: COMMERCIAL

## 2025-03-05 ENCOUNTER — RESULTS FOLLOW-UP (OUTPATIENT)
Dept: FAMILY MEDICINE | Facility: CLINIC | Age: 65
End: 2025-03-05

## 2025-03-05 ENCOUNTER — PATIENT OUTREACH (OUTPATIENT)
Facility: HOSPITAL | Age: 65
End: 2025-03-05
Payer: COMMERCIAL

## 2025-03-05 DIAGNOSIS — M25.511 RIGHT SHOULDER PAIN, UNSPECIFIED CHRONICITY: Primary | ICD-10-CM

## 2025-03-05 DIAGNOSIS — Z12.11 COLON CANCER SCREENING: Primary | ICD-10-CM

## 2025-03-05 RX ORDER — POLYETHYLENE GLYCOL 3350, SODIUM SULFATE ANHYDROUS, SODIUM BICARBONATE, SODIUM CHLORIDE, POTASSIUM CHLORIDE 236; 22.74; 6.74; 5.86; 2.97 G/4L; G/4L; G/4L; G/4L; G/4L
4 POWDER, FOR SOLUTION ORAL ONCE
Qty: 4000 ML | Refills: 0 | Status: SHIPPED | OUTPATIENT
Start: 2025-03-05 | End: 2025-03-05

## 2025-03-05 NOTE — TELEPHONE ENCOUNTER
----- Message from Yen sent at 3/5/2025 11:53 AM CST -----  Regarding: Return Call  Who Called: Garett RetanarobertPatient is returning phone callWho Left Message for Patient: Josephine Garcia the patient know what this is regarding?: Colon screeningPreferred Method of Contact: Phone CallPatient's Preferred Phone Number on File: [phone] Best Call Back Number, if different:Additional Information:

## 2025-03-05 NOTE — PROGRESS NOTES
Population Health Chart Review & Patient Outreach Details      Further Action Needed If Patient Returns Outreach:            Updates Requested / Reviewed:     []  Care Everywhere    []     []  External Sources ([facility], [facility], DIS, etc.)    [] [facility]   [] [facility]   [] Other:    []  Care Team Updated   []  Removed  or Duplicate Orders   []  Immunization Reconciliation Completed / Queried    [] [place]   [] [place]   [] [place]   [] [place]      Health Maintenance Topics Addressed and Outreach Outcomes / Actions Taken:             Breast Cancer Screening []  Mammogram Order Placed    []  Mammogram Screening Scheduled    []  External Records Requested & Care Team Updated if Applicable    []  External Records Uploaded & Care Team Updated if Applicable    []  Pt Declined Scheduling Mammogram    []  Pt Will Schedule with External Provider / Order Routed & Care Team Updated if Applicable              Cervical Cancer Screening []  Pap Smear Scheduled in Primary Care or OBGYN    []  External Records Requested & Care Team Updated if Applicable       []  External Records Uploaded, Care Team Updated, & History Updated if Applicable    []  Patient Declined Scheduling Pap Smear    []  Patient Will Schedule with External Provider & Care Team Updated if Applicable                  Colorectal Cancer Screening []  Colonoscopy Case Request / Referral / Home Test Order Placed    []  External Records Requested & Care Team Updated if Applicable    []  External Records Uploaded, Care Team Updated, & History Updated if Applicable    []  Patient Declined Completing Colon Cancer Screening    []  Patient Will Schedule with External Provider & Care Team Updated if Applicable    []  Fit Kit Mailed (add the SmartPhrase under additional notes)    []  Reminded Patient to Complete Home Test                Diabetic Eye Exam []  Eye Exam Screening Order Placed    []  Eye Camera Scheduled or Optometry/Ophthalmology Referral  Placed    []  External Records Requested & Care Team Updated if Applicable    []  External Records Uploaded, Care Team Updated, & History Updated if Applicable    []  Patient Declined Scheduling Eye Exam    []  Patient Will Schedule with External Provider & Care Team Updated if Applicable             Blood Pressure Control []  Primary Care Follow Up Visit Scheduled     []  Remote Blood Pressure Reading Captured    []  Patient Declined Remote Reading or Scheduling Appt - Escalated to PCP    []  Patient Will Call Back or Send Portal Message with Reading                 HbA1c & Other Labs []  Overdue Lab(s) Ordered    []  Overdue Lab(s) Scheduled    []  External Records Uploaded & Care Team Updated if Applicable    []  Primary Care Follow Up Visit Scheduled     []  Reminded Patient to Complete A1c Home Test    []  Patient Declined Scheduling Labs or Will Call Back to Schedule    []  Patient Will Schedule with External Provider / Order Routed, & Care Team Updated if Applicable           Primary Care Appointment []  Primary Care Appt Scheduled    []  Patient Declined Scheduling or Will Call Back to Schedule    []  Pt Established with External Provider, Updated Care Team, & Informed Pt to Notify Payor if Applicable           Medication Adherence /    Statin Use []  Primary Care Appointment Scheduled    []  Patient Reminded to  Prescription    []  Patient Declined, Provider Notified if Needed    []  Sent Provider Message to Review to Evaluate Pt for Statin, Add Exclusion Dx Codes, Document   Exclusion in Problem List, Change Statin Intensity Level to Moderate or High Intensity if Applicable                Osteoporosis Screening []  Dexa Order Placed    []  Dexa Appointment Scheduled    []  External Records Requested & Care Team Updated    []  External Records Uploaded, Care Team Updated, & History Updated if Applicable    []  Patient Declined Scheduling Dexa or Will Call Back to Schedule    []  Patient Will Schedule  with External Provider / Order Routed & Care Team Updated if Applicable       Additional Notes:.  Post visit Population Health review of encounter with date of service  3/4/25 with Nissa. Not  All required HY components in encounter.Needs primary dx as z00.00 or z00.01 message sent.    Followup appt for: 3/4/26 HY

## 2025-04-23 ENCOUNTER — TELEPHONE (OUTPATIENT)
Dept: ORTHOPEDICS | Facility: CLINIC | Age: 65
End: 2025-04-23
Payer: COMMERCIAL

## 2025-04-23 NOTE — TELEPHONE ENCOUNTER
Spoken with patient and informed him that his Mri reads that he does have a rotator cuff tear and I let him know that he is now scheduled to see Dr. Coelho on May 7th at 10:40 am to discuss treatment options. Patient understood and verbalized understanding.      Thank you,  Karen Varner CMA

## 2025-04-24 ENCOUNTER — TELEPHONE (OUTPATIENT)
Dept: GASTROENTEROLOGY | Facility: CLINIC | Age: 65
End: 2025-04-24
Payer: COMMERCIAL

## 2025-05-12 ENCOUNTER — OFFICE VISIT (OUTPATIENT)
Dept: ORTHOPEDICS | Facility: CLINIC | Age: 65
End: 2025-05-12
Payer: OTHER MISCELLANEOUS

## 2025-05-12 VITALS
SYSTOLIC BLOOD PRESSURE: 149 MMHG | BODY MASS INDEX: 20.04 KG/M2 | DIASTOLIC BLOOD PRESSURE: 80 MMHG | HEART RATE: 74 BPM | OXYGEN SATURATION: 99 % | WEIGHT: 140 LBS | HEIGHT: 70 IN

## 2025-05-12 DIAGNOSIS — G89.29 CHRONIC RIGHT SHOULDER PAIN: Primary | ICD-10-CM

## 2025-05-12 DIAGNOSIS — M12.811 ROTATOR CUFF TEAR ARTHROPATHY, RIGHT: ICD-10-CM

## 2025-05-12 DIAGNOSIS — M75.101 ROTATOR CUFF TEAR ARTHROPATHY, RIGHT: ICD-10-CM

## 2025-05-12 DIAGNOSIS — M25.511 CHRONIC RIGHT SHOULDER PAIN: Primary | ICD-10-CM

## 2025-05-12 PROCEDURE — 99999 PR PBB SHADOW E&M-EST. PATIENT-LVL IV: CPT | Mod: PBBFAC,,, | Performed by: ORTHOPAEDIC SURGERY

## 2025-05-12 PROCEDURE — 99214 OFFICE O/P EST MOD 30 MIN: CPT | Mod: S$PBB,,, | Performed by: ORTHOPAEDIC SURGERY

## 2025-05-12 PROCEDURE — 99214 OFFICE O/P EST MOD 30 MIN: CPT | Mod: PBBFAC | Performed by: ORTHOPAEDIC SURGERY
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
## 2025-06-03 ENCOUNTER — HOSPITAL ENCOUNTER (OUTPATIENT)
Dept: RADIOLOGY | Facility: HOSPITAL | Age: 65
Discharge: HOME | End: 2025-06-03
Attending: ORTHOPAEDIC SURGERY
Payer: COMMERCIAL

## 2025-06-03 DIAGNOSIS — Z01.811 PRE-OPERATIVE RESPIRATORY EXAMINATION: ICD-10-CM

## 2025-06-03 PROCEDURE — 71046 X-RAY EXAM CHEST 2 VIEWS: CPT | Mod: TC

## 2025-06-03 PROCEDURE — 71046 X-RAY EXAM CHEST 2 VIEWS: CPT | Mod: 26,,, | Performed by: RADIOLOGY

## 2025-06-04 ENCOUNTER — TELEPHONE (OUTPATIENT)
Dept: ORTHOPEDICS | Facility: CLINIC | Age: 65
End: 2025-06-04
Payer: COMMERCIAL

## 2025-06-05 ENCOUNTER — TELEPHONE (OUTPATIENT)
Dept: ORTHOPEDICS | Facility: CLINIC | Age: 65
End: 2025-06-05
Payer: COMMERCIAL

## 2025-06-05 DIAGNOSIS — Z47.89 ENCOUNTER FOR ORTHOPEDIC FOLLOW-UP CARE: Primary | ICD-10-CM

## 2025-06-05 RX ORDER — AZITHROMYCIN 250 MG/1
TABLET, FILM COATED ORAL
Qty: 6 TABLET | Refills: 0 | Status: SHIPPED | OUTPATIENT
Start: 2025-06-05 | End: 2025-06-10

## 2025-06-24 ENCOUNTER — TELEPHONE (OUTPATIENT)
Dept: CARDIOLOGY | Facility: HOSPITAL | Age: 65
End: 2025-06-24
Payer: COMMERCIAL

## 2025-06-24 ENCOUNTER — TELEPHONE (OUTPATIENT)
Dept: ORTHOPEDICS | Facility: CLINIC | Age: 65
End: 2025-06-24
Payer: COMMERCIAL

## 2025-06-24 NOTE — TELEPHONE ENCOUNTER
Called to update patient with upcoming Joint Class, on 6/25/25, at 12pm, in the [facility]; no answer, left voicemail.

## 2025-06-24 NOTE — TELEPHONE ENCOUNTER
Left message for patient to call back.  I need him to sign release for his records from years ago.  His WC company is requesting them.

## 2025-07-01 ENCOUNTER — TELEPHONE (OUTPATIENT)
Dept: ORTHOPEDICS | Facility: CLINIC | Age: 65
End: 2025-07-01
Payer: COMMERCIAL

## 2025-07-01 NOTE — TELEPHONE ENCOUNTER
SPOKE TO PT AND GAVE HIM THE NUMBER FOR THE W/C NURSE I RECEIVED FROM [employer]. I TOLD PT TO GIVE THE NURSE A CALL AND HOPEFULLY SHE CAN EXPLAIN THE SITUATION BETTER THAN I CAN.    THIS IS THE MESSAGE FROM [employer]:  [employer] sent to Shirley Batres  The email that she sent has the number as [phone] ext [phone]  Nurse's name is Olesya Hanks.      PT MESSAGE:  I really don't understand why they want a full medical history myself.  The notes show he had a work comp injury.  I know the notes say he had surgery several years ago on this same shoulder, but that wasn't related to the wc injury.  I really don't know what they're doing.  Copied from CRM #[id number]. Topic: General Inquiry - Patient Advice  >> Jul 1, 2025  3:54 PM Christina wrote:  Who Called: Garett Banegas    Caller is requesting assistance/information from provider's office.    Needs to talk to a nurse about his surgery still not being approved by workers comp due to them not getting the information the need    Preferred Method of Contact: Phone Call  Patient's Preferred Phone Number on File: [phone]   Best Call Back Number, if different:  Additional Information:

## 2025-07-02 ENCOUNTER — TELEPHONE (OUTPATIENT)
Dept: ORTHOPEDICS | Facility: CLINIC | Age: 65
End: 2025-07-02
Payer: COMMERCIAL

## 2025-07-02 NOTE — TELEPHONE ENCOUNTER
Copied from CRM #[id number]. Topic: General Inquiry - Patient Advice  >> Jul 2, 2025 10:38 AM Brenda wrote:  Who Called: Garett Banegas    He called to get his records from when Dr. Coelho did surgery on him 7-8 years ago sent to his Worker's Comp with his job ([employer]). His surgery is scheduled for tomorrow and he already signed a release form     Patient's Preferred Phone Number on File: [phone]

## 2025-07-08 ENCOUNTER — TELEPHONE (OUTPATIENT)
Dept: CARDIOLOGY | Facility: HOSPITAL | Age: 65
End: 2025-07-08
Payer: COMMERCIAL

## 2025-07-08 NOTE — TELEPHONE ENCOUNTER
Called to update patient with upcoming Joint Class, tomorrow, 7/9/25, at 12pm, in the Window Seat; no answer, left voicemail.

## 2025-07-15 ENCOUNTER — TELEPHONE (OUTPATIENT)
Dept: ORTHOPEDICS | Facility: CLINIC | Age: 65
End: 2025-07-15
Payer: COMMERCIAL

## 2025-07-15 DIAGNOSIS — Z01.812 PRE-OPERATIVE LABORATORY EXAMINATION: Primary | ICD-10-CM

## 2025-07-15 NOTE — TELEPHONE ENCOUNTER
Copied from CRM #[id number]. Topic: Appointments - Appointment Confirmation  >> Jul 14, 2025 10:03 AM Christina wrote:  Who Called: Garett Banegas    Caller is requesting assistance/information from provider's office.    Following up if anything needs to be done before surgery       Preferred Method of Contact: Phone Call  Patient's Preferred Phone Number on File: [phone]   Best Call Back Number, if different:  Additional Information:

## 2025-07-17 NOTE — TELEPHONE ENCOUNTER
Advised patient that he needs to come in to repeat his pre-op lab work. He states he can be here tomorrow morning at 10:00a.m.

## 2025-07-18 PROCEDURE — 85730 THROMBOPLASTIN TIME PARTIAL: CPT | Performed by: ORTHOPAEDIC SURGERY

## 2025-07-18 PROCEDURE — 85610 PROTHROMBIN TIME: CPT | Performed by: ORTHOPAEDIC SURGERY

## 2025-07-21 NOTE — H&P
[facility] - Orthopedic Periop Services  Orthopedics  H&P    Patient Name: Garett Banegas  MRN: [id number]  Admission Date: (Not on file)  Primary Care Provider: Giancarlo Azar MD    Patient information was obtained from patient and past medical records.     Subjective:     Principal Problem:Rotator cuff tear arthropathy, right    Chief Complaint: No chief complaint on file.       HPI: 65-year-old male with a rotator cuff tear arthropathy of the right shoulder needing reverse total shoulder arthroplasty on right  Right upper extremity moves his fingers has sensation to touch has palpable pulses.  He is tender to palpation over his anterolateral acromion over his anterior shoulder.  Less than 90° of forward flexion and abduction.  Less than 20° of external rotation.  That has crepitus on motion.  X-rays show elevation of the right humeral head with a degenerative changes of the glenohumeral joint  Impression rotator cuff tear arthropathy right shoulder  Plan reverse total shoulder arthroplasty on right    Past Medical History:   Diagnosis Date    Asthma     CAD (coronary artery disease)     Compressed cervical disc 07/2023    COPD (chronic obstructive pulmonary disease)     Hyperlipidemia     Hypertension     Low back pain     Tobacco dependence due to cigarettes        Past Surgical History:   Procedure Laterality Date    APPENDECTOMY      BACK SURGERY      x3    CARDIAC SURGERY      HAND SURGERY      LEFT HEART CATHETERIZATION      ROTATOR CUFF REPAIR Right     SINUS SURGERY         Review of patient's allergies indicates:   Allergen Reactions    Penicillins Rash     Other reaction(s): Hives, Unknown       No current facility-administered medications for this encounter.     Current Outpatient Medications   Medication Sig    albuterol (PROVENTIL HFA) 90 mcg/actuation inhaler Inhale 2 puffs into the lungs every 6 (six) hours as needed for Wheezing. Rescue    amLODIPine (NORVASC) 5 MG tablet  (Patient not  taking: Reported on 3/4/2025)    aspirin (ECOTRIN) 81 MG EC tablet Take 81 mg by mouth once daily.    benzonatate (TESSALON) 100 MG capsule Take 1 capsule (100 mg total) by mouth 3 (three) times daily as needed for Cough. (Patient not taking: Reported on 3/4/2025)    budesonide (PULMICORT FLEXHALER) 90 mcg/actuation AePB Inhale 2 puffs (180 mcg total) into the lungs 2 (two) times a day. Controller    budesonide-formoterol 160-4.5 mcg (BREYNA) 160-4.5 mcg/actuation HFAA INHALE 2 PUFFS INTO THE LUNGS EVERY 12 HOURS.    carvediloL (COREG) 6.25 MG tablet Take 1 tablet (6.25 mg total) by mouth once daily.    clopidogreL (PLAVIX) 75 mg tablet Take 1 tablet (75 mg total) by mouth once daily.    fluticasone-umeclidin-vilanter (TRELEGY ELLIPTA) 100-62.5-25 mcg DsDv Inhale 1 puff into the lungs once daily. (Patient not taking: Reported on 3/4/2025)    irbesartan (AVAPRO) 150 MG tablet  (Patient not taking: Reported on 3/4/2025)    losartan (COZAAR) 50 MG tablet Take 1 tablet (50 mg total) by mouth once daily.    morphine (MS CONTIN) 30 MG 12 hr tablet Take 30 mg by mouth every 12 (twelve) hours. (Patient not taking: Reported on 3/4/2025)    naloxone (NARCAN) 4 mg/actuation Spry 1 spray by Nasal route.    nicotine (NICODERM CQ) 21 mg/24 hr Place 1 patch onto the skin once daily. (Patient not taking: Reported on 3/4/2025)    rosuvastatin (CRESTOR) 20 MG tablet Take 1 tablet (20 mg total) by mouth once daily.    SPIRIVA RESPIMAT 2.5 mcg/actuation inhaler Inhale 2 puffs into the lungs. (Patient not taking: Reported on 3/4/2025)     Family History       Problem Relation (Age of Onset)    Cancer Other    Diabetes Other    Epilepsy Other    Heart disease Other    Hypertension Other    Stroke Other          Tobacco Use    Smoking status: Every Day     Passive exposure: Past    Smokeless tobacco: Never   Substance and Sexual Activity    Alcohol use: Yes    Drug use: Never    Sexual activity: Yes     ROS  Objective:     Vital Signs  (Most Recent):    Vital Signs (24h Range):              There is no height or weight on file to calculate BMI.    No intake or output data in the 24 hours ending 07/21/25 1424             Right Shoulder Exam     Tenderness   The patient is tender to palpation of the supraspinatus.    Crepitus   The patient has crepitus of the supraspinatus.    Other   Sensation: normal    Vascular Exam     Right Pulses      Radial:                    2+         Significant Labs: All pertinent labs within the past 24 hours have been reviewed.    Significant Imaging: I have reviewed all pertinent imaging results/findings.  Assessment/Plan:     No notes have been filed under this hospital service.  Service: Orthopedic Surgery      Giancarlo Coelho MD  Orthopedics  [facility] - Orthopedic Periop Services

## 2025-07-21 NOTE — SUBJECTIVE & OBJECTIVE
Past Medical History:   Diagnosis Date    Asthma     CAD (coronary artery disease)     Compressed cervical disc 07/2023    COPD (chronic obstructive pulmonary disease)     Hyperlipidemia     Hypertension     Low back pain     Tobacco dependence due to cigarettes        Past Surgical History:   Procedure Laterality Date    APPENDECTOMY      BACK SURGERY      x3    CARDIAC SURGERY      HAND SURGERY      LEFT HEART CATHETERIZATION      ROTATOR CUFF REPAIR Right     SINUS SURGERY         Review of patient's allergies indicates:   Allergen Reactions    Penicillins Rash     Other reaction(s): Hives, Unknown       No current facility-administered medications for this encounter.     Current Outpatient Medications   Medication Sig    albuterol (PROVENTIL HFA) 90 mcg/actuation inhaler Inhale 2 puffs into the lungs every 6 (six) hours as needed for Wheezing. Rescue    amLODIPine (NORVASC) 5 MG tablet  (Patient not taking: Reported on 3/4/2025)    aspirin (ECOTRIN) 81 MG EC tablet Take 81 mg by mouth once daily.    benzonatate (TESSALON) 100 MG capsule Take 1 capsule (100 mg total) by mouth 3 (three) times daily as needed for Cough. (Patient not taking: Reported on 3/4/2025)    budesonide (PULMICORT FLEXHALER) 90 mcg/actuation AePB Inhale 2 puffs (180 mcg total) into the lungs 2 (two) times a day. Controller    budesonide-formoterol 160-4.5 mcg (BREYNA) 160-4.5 mcg/actuation HFAA INHALE 2 PUFFS INTO THE LUNGS EVERY 12 HOURS.    carvediloL (COREG) 6.25 MG tablet Take 1 tablet (6.25 mg total) by mouth once daily.    clopidogreL (PLAVIX) 75 mg tablet Take 1 tablet (75 mg total) by mouth once daily.    fluticasone-umeclidin-vilanter (TRELEGY ELLIPTA) 100-62.5-25 mcg DsDv Inhale 1 puff into the lungs once daily. (Patient not taking: Reported on 3/4/2025)    irbesartan (AVAPRO) 150 MG tablet  (Patient not taking: Reported on 3/4/2025)    losartan (COZAAR) 50 MG tablet Take 1 tablet (50 mg total) by mouth once daily.    morphine (MS  CONTIN) 30 MG 12 hr tablet Take 30 mg by mouth every 12 (twelve) hours. (Patient not taking: Reported on 3/4/2025)    naloxone (NARCAN) 4 mg/actuation Spry 1 spray by Nasal route.    nicotine (NICODERM CQ) 21 mg/24 hr Place 1 patch onto the skin once daily. (Patient not taking: Reported on 3/4/2025)    rosuvastatin (CRESTOR) 20 MG tablet Take 1 tablet (20 mg total) by mouth once daily.    SPIRIVA RESPIMAT 2.5 mcg/actuation inhaler Inhale 2 puffs into the lungs. (Patient not taking: Reported on 3/4/2025)     Family History       Problem Relation (Age of Onset)    Cancer Other    Diabetes Other    Epilepsy Other    Heart disease Other    Hypertension Other    Stroke Other          Tobacco Use    Smoking status: Every Day     Passive exposure: Past    Smokeless tobacco: Never   Substance and Sexual Activity    Alcohol use: Yes    Drug use: Never    Sexual activity: Yes     ROS  Objective:     Vital Signs (Most Recent):    Vital Signs (24h Range):              There is no height or weight on file to calculate BMI.    No intake or output data in the 24 hours ending 07/21/25 1424             Right Shoulder Exam     Tenderness   The patient is tender to palpation of the supraspinatus.    Crepitus   The patient has crepitus of the supraspinatus.    Other   Sensation: normal    Vascular Exam     Right Pulses      Radial:                    2+         Significant Labs: All pertinent labs within the past 24 hours have been reviewed.    Significant Imaging: I have reviewed all pertinent imaging results/findings.

## 2025-07-21 NOTE — HPI
65-year-old male with a rotator cuff tear arthropathy of the right shoulder needing reverse total shoulder arthroplasty on right  Right upper extremity moves his fingers has sensation to touch has palpable pulses.  He is tender to palpation over his anterolateral acromion over his anterior shoulder.  Less than 90° of forward flexion and abduction.  Less than 20° of external rotation.  That has crepitus on motion.  X-rays show elevation of the right humeral head with a degenerative changes of the glenohumeral joint  Impression rotator cuff tear arthropathy right shoulder  Plan reverse total shoulder arthroplasty on right

## 2025-07-22 ENCOUNTER — ANESTHESIA (OUTPATIENT)
Dept: SURGERY | Facility: HOSPITAL | Age: 65
End: 2025-07-22
Payer: OTHER MISCELLANEOUS

## 2025-07-22 ENCOUNTER — HOSPITAL ENCOUNTER (OUTPATIENT)
Facility: HOSPITAL | Age: 65
Discharge: HOME | End: 2025-07-23
Attending: ORTHOPAEDIC SURGERY | Admitting: ORTHOPAEDIC SURGERY
Payer: OTHER MISCELLANEOUS

## 2025-07-22 ENCOUNTER — ANESTHESIA EVENT (OUTPATIENT)
Dept: SURGERY | Facility: HOSPITAL | Age: 65
End: 2025-07-22
Payer: OTHER MISCELLANEOUS

## 2025-07-22 DIAGNOSIS — M75.101 ROTATOR CUFF TEAR ARTHROPATHY, RIGHT: Primary | ICD-10-CM

## 2025-07-22 DIAGNOSIS — Z01.818 PRE-OP TESTING: ICD-10-CM

## 2025-07-22 DIAGNOSIS — M12.811 ROTATOR CUFF TEAR ARTHROPATHY, RIGHT: Primary | ICD-10-CM

## 2025-07-22 PROBLEM — J44.89 ASTHMA-COPD OVERLAP SYNDROME: Status: ACTIVE | Noted: 2023-10-12

## 2025-07-22 PROBLEM — J95.811 PNEUMOTHORAX AFTER BIOPSY: Status: RESOLVED | Noted: 2023-10-12 | Resolved: 2025-07-22

## 2025-07-22 LAB
ANION GAP SERPL CALCULATED.3IONS-SCNC: 9 MMOL/L (ref 7–16)
BASOPHILS # BLD AUTO: 0.04 K/UL (ref 0–0.2)
BASOPHILS NFR BLD AUTO: 0.4 % (ref 0–1)
BUN SERPL-MCNC: 16 MG/DL (ref 8–26)
BUN/CREAT SERPL: 16 (ref 6–20)
CALCIUM SERPL-MCNC: 7.6 MG/DL (ref 8.8–10)
CHLORIDE SERPL-SCNC: 113 MMOL/L (ref 98–107)
CO2 SERPL-SCNC: 20 MMOL/L (ref 23–31)
CREAT SERPL-MCNC: 0.97 MG/DL (ref 0.72–1.25)
DIFFERENTIAL METHOD BLD: ABNORMAL
EGFR (NO RACE VARIABLE) (RUSH/TITUS): 87 ML/MIN/1.73M2
EOSINOPHIL # BLD AUTO: 0.28 K/UL (ref 0–0.5)
EOSINOPHIL NFR BLD AUTO: 2.9 % (ref 1–4)
ERYTHROCYTE [DISTWIDTH] IN BLOOD BY AUTOMATED COUNT: 13.2 % (ref 11.5–14.5)
GLUCOSE SERPL-MCNC: 133 MG/DL (ref 82–115)
HCT VFR BLD AUTO: 40.9 % (ref 40–54)
HGB BLD-MCNC: 12.8 G/DL (ref 13.5–18)
IMM GRANULOCYTES # BLD AUTO: 0.04 K/UL (ref 0–0.04)
IMM GRANULOCYTES NFR BLD: 0.4 % (ref 0–0.4)
LYMPHOCYTES # BLD AUTO: 1.22 K/UL (ref 1–4.8)
LYMPHOCYTES NFR BLD AUTO: 12.4 % (ref 27–41)
MCH RBC QN AUTO: 31.1 PG (ref 27–31)
MCHC RBC AUTO-ENTMCNC: 31.3 G/DL (ref 32–36)
MCV RBC AUTO: 99.3 FL (ref 80–96)
MONOCYTES # BLD AUTO: 0.33 K/UL (ref 0–0.8)
MONOCYTES NFR BLD AUTO: 3.4 % (ref 2–6)
MPC BLD CALC-MCNC: 10.3 FL (ref 9.4–12.4)
NEUTROPHILS # BLD AUTO: 7.89 K/UL (ref 1.8–7.7)
NEUTROPHILS NFR BLD AUTO: 80.5 % (ref 53–65)
NRBC # BLD AUTO: 0 X10E3/UL
NRBC, AUTO (.00): 0 %
PLATELET # BLD AUTO: 207 K/UL (ref 150–400)
POTASSIUM SERPL-SCNC: 4.3 MMOL/L (ref 3.5–5.1)
RBC # BLD AUTO: 4.12 M/UL (ref 4.6–6.2)
SODIUM SERPL-SCNC: 138 MMOL/L (ref 136–145)
WBC # BLD AUTO: 9.8 K/UL (ref 4.5–11)

## 2025-07-22 PROCEDURE — 25000003 PHARM REV CODE 250: Performed by: ANESTHESIOLOGY

## 2025-07-22 PROCEDURE — 71000015 HC POSTOP RECOV 1ST HR: Performed by: ORTHOPAEDIC SURGERY

## 2025-07-22 PROCEDURE — 25000003 PHARM REV CODE 250: Performed by: NURSE ANESTHETIST, CERTIFIED REGISTERED

## 2025-07-22 PROCEDURE — 25000003 PHARM REV CODE 250: Performed by: ORTHOPAEDIC SURGERY

## 2025-07-22 PROCEDURE — 25000242 PHARM REV CODE 250 ALT 637 W/ HCPCS: Performed by: ANESTHESIOLOGY

## 2025-07-22 PROCEDURE — 27200700 HC TUBE, ENDOTRACHEAL NASAL RAE: Performed by: NURSE ANESTHETIST, CERTIFIED REGISTERED

## 2025-07-22 PROCEDURE — 63600175 PHARM REV CODE 636 W HCPCS: Performed by: ANESTHESIOLOGY

## 2025-07-22 PROCEDURE — 27200750 HC INSULATED NEEDLE/ STIMUPLEX: Performed by: NURSE ANESTHETIST, CERTIFIED REGISTERED

## 2025-07-22 PROCEDURE — 27000655: Performed by: NURSE ANESTHETIST, CERTIFIED REGISTERED

## 2025-07-22 PROCEDURE — 63600175 PHARM REV CODE 636 W HCPCS: Performed by: FAMILY MEDICINE

## 2025-07-22 PROCEDURE — C1769 GUIDE WIRE: HCPCS | Performed by: ORTHOPAEDIC SURGERY

## 2025-07-22 PROCEDURE — 85025 COMPLETE CBC W/AUTO DIFF WBC: CPT | Performed by: ORTHOPAEDIC SURGERY

## 2025-07-22 PROCEDURE — 36000710: Performed by: ORTHOPAEDIC SURGERY

## 2025-07-22 PROCEDURE — 94640 AIRWAY INHALATION TREATMENT: CPT

## 2025-07-22 PROCEDURE — 71000016 HC POSTOP RECOV ADDL HR: Performed by: ORTHOPAEDIC SURGERY

## 2025-07-22 PROCEDURE — 27000716 HC OXISENSOR PROBE, ANY SIZE: Performed by: NURSE ANESTHETIST, CERTIFIED REGISTERED

## 2025-07-22 PROCEDURE — 64415 NJX AA&/STRD BRCH PLXS IMG: CPT | Mod: XU,RT,, | Performed by: ANESTHESIOLOGY

## 2025-07-22 PROCEDURE — 63600175 PHARM REV CODE 636 W HCPCS: Performed by: NURSE ANESTHETIST, CERTIFIED REGISTERED

## 2025-07-22 PROCEDURE — 94799 UNLISTED PULMONARY SVC/PX: CPT

## 2025-07-22 PROCEDURE — 93010 ELECTROCARDIOGRAM REPORT: CPT | Mod: ,,, | Performed by: INTERNAL MEDICINE

## 2025-07-22 PROCEDURE — C1713 ANCHOR/SCREW BN/BN,TIS/BN: HCPCS | Performed by: ORTHOPAEDIC SURGERY

## 2025-07-22 PROCEDURE — C1776 JOINT DEVICE (IMPLANTABLE): HCPCS | Performed by: ORTHOPAEDIC SURGERY

## 2025-07-22 PROCEDURE — 94761 N-INVAS EAR/PLS OXIMETRY MLT: CPT

## 2025-07-22 PROCEDURE — 37000008 HC ANESTHESIA 1ST 15 MINUTES: Performed by: ORTHOPAEDIC SURGERY

## 2025-07-22 PROCEDURE — 80048 BASIC METABOLIC PNL TOTAL CA: CPT | Performed by: ORTHOPAEDIC SURGERY

## 2025-07-22 PROCEDURE — 63600175 PHARM REV CODE 636 W HCPCS: Performed by: ORTHOPAEDIC SURGERY

## 2025-07-22 PROCEDURE — 36000711: Performed by: ORTHOPAEDIC SURGERY

## 2025-07-22 PROCEDURE — 27000510 HC BLANKET BAIR HUGGER ANY SIZE: Performed by: NURSE ANESTHETIST, CERTIFIED REGISTERED

## 2025-07-22 PROCEDURE — D9220A PRA ANESTHESIA: Mod: ANES,,, | Performed by: ANESTHESIOLOGY

## 2025-07-22 PROCEDURE — 99900035 HC TECH TIME PER 15 MIN (STAT)

## 2025-07-22 PROCEDURE — 25000003 PHARM REV CODE 250: Performed by: FAMILY MEDICINE

## 2025-07-22 PROCEDURE — 27000689 HC BLADE LARYNGOSCOPE ANY SIZE: Performed by: NURSE ANESTHETIST, CERTIFIED REGISTERED

## 2025-07-22 PROCEDURE — 36415 COLL VENOUS BLD VENIPUNCTURE: CPT | Performed by: ORTHOPAEDIC SURGERY

## 2025-07-22 PROCEDURE — 27000284 HC CANNULA NASAL: Performed by: NURSE ANESTHETIST, CERTIFIED REGISTERED

## 2025-07-22 PROCEDURE — D9220A PRA ANESTHESIA: Mod: CRNA,,, | Performed by: NURSE ANESTHETIST, CERTIFIED REGISTERED

## 2025-07-22 PROCEDURE — 99214 OFFICE O/P EST MOD 30 MIN: CPT | Mod: ,,, | Performed by: FAMILY MEDICINE

## 2025-07-22 PROCEDURE — 23472 RECONSTRUCT SHOULDER JOINT: CPT | Mod: LT,,, | Performed by: ORTHOPAEDIC SURGERY

## 2025-07-22 PROCEDURE — 27201423 OPTIME MED/SURG SUP & DEVICES STERILE SUPPLY: Performed by: ORTHOPAEDIC SURGERY

## 2025-07-22 PROCEDURE — 27000165 HC TUBE, ETT CUFFED: Performed by: NURSE ANESTHETIST, CERTIFIED REGISTERED

## 2025-07-22 PROCEDURE — 63600175 PHARM REV CODE 636 W HCPCS: Performed by: HOSPITALIST

## 2025-07-22 PROCEDURE — 93005 ELECTROCARDIOGRAM TRACING: CPT

## 2025-07-22 PROCEDURE — 37000009 HC ANESTHESIA EA ADD 15 MINS: Performed by: ORTHOPAEDIC SURGERY

## 2025-07-22 PROCEDURE — 71000033 HC RECOVERY, INTIAL HOUR: Performed by: ORTHOPAEDIC SURGERY

## 2025-07-22 DEVICE — GLENOSPHERE , CONCENTRIC
Type: IMPLANTABLE DEVICE | Site: SHOULDER | Status: FUNCTIONAL
Brand: REUNION

## 2025-07-22 DEVICE — PRESS-FIT HUMERAL STEM
Type: IMPLANTABLE DEVICE | Site: SHOULDER | Status: FUNCTIONAL
Brand: REUNION

## 2025-07-22 DEVICE — X3 HUMERAL INSERT
Type: IMPLANTABLE DEVICE | Site: SHOULDER | Status: FUNCTIONAL
Brand: REUNION

## 2025-07-22 DEVICE — 4.5MM PERIPHERAL SCREW
Type: IMPLANTABLE DEVICE | Site: SHOULDER | Status: FUNCTIONAL
Brand: REUNION

## 2025-07-22 DEVICE — 6.5MM CENTER SCREW
Type: IMPLANTABLE DEVICE | Site: SHOULDER | Status: FUNCTIONAL
Brand: REUNION

## 2025-07-22 DEVICE — HUMERAL CUP
Type: IMPLANTABLE DEVICE | Site: SHOULDER | Status: FUNCTIONAL
Brand: REUNION

## 2025-07-22 DEVICE — GLENOID BASEPLATE
Type: IMPLANTABLE DEVICE | Site: SHOULDER | Status: FUNCTIONAL
Brand: REUNION

## 2025-07-22 RX ORDER — IPRATROPIUM BROMIDE AND ALBUTEROL SULFATE 2.5; .5 MG/3ML; MG/3ML
3 SOLUTION RESPIRATORY (INHALATION) ONCE
Status: DISCONTINUED | OUTPATIENT
Start: 2025-07-22 | End: 2025-07-22 | Stop reason: HOSPADM

## 2025-07-22 RX ORDER — MORPHINE SULFATE 4 MG/ML
4 INJECTION, SOLUTION INTRAMUSCULAR; INTRAVENOUS EVERY 4 HOURS PRN
Status: DISCONTINUED | OUTPATIENT
Start: 2025-07-22 | End: 2025-07-23 | Stop reason: HOSPADM

## 2025-07-22 RX ORDER — ONDANSETRON HYDROCHLORIDE 2 MG/ML
4 INJECTION, SOLUTION INTRAVENOUS DAILY PRN
Status: DISCONTINUED | OUTPATIENT
Start: 2025-07-22 | End: 2025-07-22 | Stop reason: HOSPADM

## 2025-07-22 RX ORDER — ALBUTEROL SULFATE 0.83 MG/ML
2.5 SOLUTION RESPIRATORY (INHALATION) EVERY 6 HOURS PRN
Status: DISCONTINUED | OUTPATIENT
Start: 2025-07-22 | End: 2025-07-23 | Stop reason: HOSPADM

## 2025-07-22 RX ORDER — IBUPROFEN 200 MG
1 TABLET ORAL DAILY PRN
Status: DISCONTINUED | OUTPATIENT
Start: 2025-07-22 | End: 2025-07-23 | Stop reason: HOSPADM

## 2025-07-22 RX ORDER — GLYCOPYRROLATE 0.2 MG/ML
INJECTION INTRAMUSCULAR; INTRAVENOUS
Status: DISCONTINUED | OUTPATIENT
Start: 2025-07-22 | End: 2025-07-22

## 2025-07-22 RX ORDER — SODIUM CHLORIDE 9 MG/ML
INJECTION, SOLUTION INTRAVENOUS CONTINUOUS
Status: DISCONTINUED | OUTPATIENT
Start: 2025-07-22 | End: 2025-07-22

## 2025-07-22 RX ORDER — ATORVASTATIN CALCIUM 10 MG/1
10 TABLET, FILM COATED ORAL DAILY
Status: DISCONTINUED | OUTPATIENT
Start: 2025-07-22 | End: 2025-07-23 | Stop reason: HOSPADM

## 2025-07-22 RX ORDER — TRANEXAMIC ACID 1 G/10ML
INJECTION, SOLUTION INTRAVENOUS
Status: DISCONTINUED | OUTPATIENT
Start: 2025-07-22 | End: 2025-07-22

## 2025-07-22 RX ORDER — CARVEDILOL 6.25 MG/1
6.25 TABLET ORAL DAILY
Status: DISCONTINUED | OUTPATIENT
Start: 2025-07-23 | End: 2025-07-22

## 2025-07-22 RX ORDER — CALCIUM CARBONATE 200(500)MG
1000 TABLET,CHEWABLE ORAL 2 TIMES DAILY PRN
Status: DISCONTINUED | OUTPATIENT
Start: 2025-07-22 | End: 2025-07-23 | Stop reason: HOSPADM

## 2025-07-22 RX ORDER — SIMETHICONE 80 MG
1 TABLET,CHEWABLE ORAL 3 TIMES DAILY PRN
Status: DISCONTINUED | OUTPATIENT
Start: 2025-07-22 | End: 2025-07-23 | Stop reason: HOSPADM

## 2025-07-22 RX ORDER — ASPIRIN 81 MG/1
81 TABLET ORAL DAILY
Status: DISCONTINUED | OUTPATIENT
Start: 2025-07-22 | End: 2025-07-23 | Stop reason: HOSPADM

## 2025-07-22 RX ORDER — IPRATROPIUM BROMIDE AND ALBUTEROL SULFATE 2.5; .5 MG/3ML; MG/3ML
3 SOLUTION RESPIRATORY (INHALATION) ONCE
Status: COMPLETED | OUTPATIENT
Start: 2025-07-22 | End: 2025-07-22

## 2025-07-22 RX ORDER — MIDAZOLAM HYDROCHLORIDE 5 MG/ML
INJECTION INTRAMUSCULAR; INTRAVENOUS
Status: DISCONTINUED | OUTPATIENT
Start: 2025-07-22 | End: 2025-07-22

## 2025-07-22 RX ORDER — BUDESONIDE 0.5 MG/2ML
0.5 INHALANT ORAL EVERY 12 HOURS
Status: DISCONTINUED | OUTPATIENT
Start: 2025-07-22 | End: 2025-07-23 | Stop reason: HOSPADM

## 2025-07-22 RX ORDER — OXYCODONE AND ACETAMINOPHEN 10; 325 MG/1; MG/1
1 TABLET ORAL EVERY 6 HOURS PRN
COMMUNITY

## 2025-07-22 RX ORDER — ACETAMINOPHEN 10 MG/ML
1000 INJECTION, SOLUTION INTRAVENOUS ONCE
Status: DISCONTINUED | OUTPATIENT
Start: 2025-07-22 | End: 2025-07-22 | Stop reason: HOSPADM

## 2025-07-22 RX ORDER — PROPOFOL 10 MG/ML
VIAL (ML) INTRAVENOUS
Status: DISCONTINUED | OUTPATIENT
Start: 2025-07-22 | End: 2025-07-22

## 2025-07-22 RX ORDER — MORPHINE SULFATE 10 MG/ML
4 INJECTION INTRAMUSCULAR; INTRAVENOUS; SUBCUTANEOUS EVERY 5 MIN PRN
Status: DISCONTINUED | OUTPATIENT
Start: 2025-07-22 | End: 2025-07-22 | Stop reason: HOSPADM

## 2025-07-22 RX ORDER — LORAZEPAM 2 MG/ML
1 INJECTION INTRAMUSCULAR
Status: COMPLETED | OUTPATIENT
Start: 2025-07-22 | End: 2025-07-22

## 2025-07-22 RX ORDER — FENTANYL CITRATE 50 UG/ML
100 INJECTION, SOLUTION INTRAMUSCULAR; INTRAVENOUS ONCE
Refills: 0 | Status: COMPLETED | OUTPATIENT
Start: 2025-07-22 | End: 2025-07-22

## 2025-07-22 RX ORDER — SODIUM CHLORIDE 9 MG/ML
INJECTION, SOLUTION INTRAVENOUS CONTINUOUS
Status: DISCONTINUED | OUTPATIENT
Start: 2025-07-22 | End: 2025-07-23 | Stop reason: HOSPADM

## 2025-07-22 RX ORDER — ACETAMINOPHEN 500 MG
1000 TABLET ORAL EVERY 6 HOURS PRN
Status: DISCONTINUED | OUTPATIENT
Start: 2025-07-22 | End: 2025-07-23 | Stop reason: HOSPADM

## 2025-07-22 RX ORDER — PHENYLEPHRINE HYDROCHLORIDE 10 MG/ML
INJECTION INTRAVENOUS
Status: DISCONTINUED | OUTPATIENT
Start: 2025-07-22 | End: 2025-07-22

## 2025-07-22 RX ORDER — DIPHENHYDRAMINE HYDROCHLORIDE 50 MG/ML
50 INJECTION, SOLUTION INTRAMUSCULAR; INTRAVENOUS ONCE
Status: COMPLETED | OUTPATIENT
Start: 2025-07-22 | End: 2025-07-22

## 2025-07-22 RX ORDER — FLUTICASONE FUROATE AND VILANTEROL 100; 25 UG/1; UG/1
1 POWDER RESPIRATORY (INHALATION) DAILY
Status: DISCONTINUED | OUTPATIENT
Start: 2025-07-22 | End: 2025-07-22

## 2025-07-22 RX ORDER — ONDANSETRON HYDROCHLORIDE 2 MG/ML
INJECTION, SOLUTION INTRAVENOUS
Status: DISCONTINUED | OUTPATIENT
Start: 2025-07-22 | End: 2025-07-22

## 2025-07-22 RX ORDER — ROCURONIUM BROMIDE 10 MG/ML
INJECTION, SOLUTION INTRAVENOUS
Status: DISCONTINUED | OUTPATIENT
Start: 2025-07-22 | End: 2025-07-22

## 2025-07-22 RX ORDER — LIDOCAINE HYDROCHLORIDE 20 MG/ML
INJECTION, SOLUTION EPIDURAL; INFILTRATION; INTRACAUDAL; PERINEURAL
Status: DISCONTINUED | OUTPATIENT
Start: 2025-07-22 | End: 2025-07-22

## 2025-07-22 RX ORDER — LOSARTAN POTASSIUM 50 MG/1
50 TABLET ORAL DAILY
Status: DISCONTINUED | OUTPATIENT
Start: 2025-07-23 | End: 2025-07-23 | Stop reason: HOSPADM

## 2025-07-22 RX ORDER — DIPHENHYDRAMINE HYDROCHLORIDE 50 MG/ML
25 INJECTION, SOLUTION INTRAMUSCULAR; INTRAVENOUS EVERY 6 HOURS PRN
Status: DISCONTINUED | OUTPATIENT
Start: 2025-07-22 | End: 2025-07-22 | Stop reason: HOSPADM

## 2025-07-22 RX ORDER — ONDANSETRON HYDROCHLORIDE 2 MG/ML
4 INJECTION, SOLUTION INTRAVENOUS EVERY 8 HOURS PRN
Status: DISCONTINUED | OUTPATIENT
Start: 2025-07-22 | End: 2025-07-23 | Stop reason: HOSPADM

## 2025-07-22 RX ORDER — DIAZEPAM 5 MG/1
10 TABLET ORAL ONCE
Status: COMPLETED | OUTPATIENT
Start: 2025-07-22 | End: 2025-07-22

## 2025-07-22 RX ORDER — CLINDAMYCIN PHOSPHATE 900 MG/50ML
900 INJECTION, SOLUTION INTRAVENOUS
Status: DISCONTINUED | OUTPATIENT
Start: 2025-07-22 | End: 2025-07-22 | Stop reason: HOSPADM

## 2025-07-22 RX ORDER — HYDROMORPHONE HYDROCHLORIDE 2 MG/ML
0.5 INJECTION, SOLUTION INTRAMUSCULAR; INTRAVENOUS; SUBCUTANEOUS EVERY 5 MIN PRN
Status: DISCONTINUED | OUTPATIENT
Start: 2025-07-22 | End: 2025-07-22 | Stop reason: HOSPADM

## 2025-07-22 RX ORDER — CLINDAMYCIN PHOSPHATE 600 MG/50ML
600 INJECTION, SOLUTION INTRAVENOUS
Status: DISCONTINUED | OUTPATIENT
Start: 2025-07-22 | End: 2025-07-23 | Stop reason: HOSPADM

## 2025-07-22 RX ORDER — ALBUTEROL SULFATE 0.83 MG/ML
2.5 SOLUTION RESPIRATORY (INHALATION) EVERY 6 HOURS
Status: DISCONTINUED | OUTPATIENT
Start: 2025-07-22 | End: 2025-07-23 | Stop reason: HOSPADM

## 2025-07-22 RX ORDER — GUAIFENESIN AND DEXTROMETHORPHAN HYDROBROMIDE 10; 100 MG/5ML; MG/5ML
10 SYRUP ORAL EVERY 4 HOURS PRN
Status: DISCONTINUED | OUTPATIENT
Start: 2025-07-22 | End: 2025-07-23 | Stop reason: HOSPADM

## 2025-07-22 RX ORDER — ASPIRIN 325 MG
325 TABLET, DELAYED RELEASE (ENTERIC COATED) ORAL DAILY
COMMUNITY

## 2025-07-22 RX ORDER — DEXAMETHASONE SODIUM PHOSPHATE 4 MG/ML
INJECTION, SOLUTION INTRA-ARTICULAR; INTRALESIONAL; INTRAMUSCULAR; INTRAVENOUS; SOFT TISSUE
Status: DISCONTINUED | OUTPATIENT
Start: 2025-07-22 | End: 2025-07-22

## 2025-07-22 RX ORDER — FENTANYL CITRATE 50 UG/ML
INJECTION, SOLUTION INTRAMUSCULAR; INTRAVENOUS
Status: DISCONTINUED | OUTPATIENT
Start: 2025-07-22 | End: 2025-07-22

## 2025-07-22 RX ORDER — EPHEDRINE SULFATE 50 MG/ML
INJECTION, SOLUTION INTRAVENOUS
Status: DISCONTINUED | OUTPATIENT
Start: 2025-07-22 | End: 2025-07-22

## 2025-07-22 RX ORDER — HYDROCODONE BITARTRATE AND ACETAMINOPHEN 5; 325 MG/1; MG/1
1 TABLET ORAL EVERY 4 HOURS PRN
Status: DISCONTINUED | OUTPATIENT
Start: 2025-07-22 | End: 2025-07-23 | Stop reason: HOSPADM

## 2025-07-22 RX ORDER — DOCUSATE SODIUM 100 MG/1
100 CAPSULE, LIQUID FILLED ORAL EVERY 12 HOURS
Status: DISCONTINUED | OUTPATIENT
Start: 2025-07-22 | End: 2025-07-23 | Stop reason: HOSPADM

## 2025-07-22 RX ORDER — HYDROXYZINE PAMOATE 25 MG/1
25 CAPSULE ORAL EVERY 8 HOURS PRN
Status: DISCONTINUED | OUTPATIENT
Start: 2025-07-22 | End: 2025-07-23 | Stop reason: HOSPADM

## 2025-07-22 RX ORDER — TALC
9 POWDER (GRAM) TOPICAL NIGHTLY PRN
Status: DISCONTINUED | OUTPATIENT
Start: 2025-07-22 | End: 2025-07-23 | Stop reason: HOSPADM

## 2025-07-22 RX ORDER — CLOPIDOGREL BISULFATE 75 MG/1
75 TABLET ORAL DAILY
Status: DISCONTINUED | OUTPATIENT
Start: 2025-07-22 | End: 2025-07-23 | Stop reason: HOSPADM

## 2025-07-22 RX ORDER — BISACODYL 10 MG/1
10 SUPPOSITORY RECTAL DAILY PRN
Status: DISCONTINUED | OUTPATIENT
Start: 2025-07-22 | End: 2025-07-23 | Stop reason: HOSPADM

## 2025-07-22 RX ORDER — CARVEDILOL 6.25 MG/1
6.25 TABLET ORAL 2 TIMES DAILY
Status: DISCONTINUED | OUTPATIENT
Start: 2025-07-22 | End: 2025-07-23 | Stop reason: HOSPADM

## 2025-07-22 RX ADMIN — PHENYLEPHRINE HYDROCHLORIDE 100 MCG: 10 INJECTION INTRAVENOUS at 08:07

## 2025-07-22 RX ADMIN — DEXAMETHASONE SODIUM PHOSPHATE 8 MG: 4 INJECTION, SOLUTION INTRA-ARTICULAR; INTRALESIONAL; INTRAMUSCULAR; INTRAVENOUS; SOFT TISSUE at 08:07

## 2025-07-22 RX ADMIN — SUGAMMADEX 200 MG: 100 INJECTION, SOLUTION INTRAVENOUS at 09:07

## 2025-07-22 RX ADMIN — HYDROXYZINE PAMOATE 25 MG: 25 CAPSULE ORAL at 04:07

## 2025-07-22 RX ADMIN — CARVEDILOL 6.25 MG: 6.25 TABLET, FILM COATED ORAL at 07:07

## 2025-07-22 RX ADMIN — EPHEDRINE SULFATE 25 MG: 50 INJECTION INTRAVENOUS at 09:07

## 2025-07-22 RX ADMIN — ROPIVACAINE HYDROCHLORIDE 30 ML: 5 INJECTION, SOLUTION EPIDURAL; INFILTRATION; PERINEURAL at 08:07

## 2025-07-22 RX ADMIN — CLINDAMYCIN PHOSPHATE 600 MG: 600 INJECTION, SOLUTION INTRAVENOUS at 03:07

## 2025-07-22 RX ADMIN — DIPHENHYDRAMINE HYDROCHLORIDE 50 MG: 50 INJECTION INTRAMUSCULAR; INTRAVENOUS at 09:07

## 2025-07-22 RX ADMIN — TRANEXAMIC ACID 1000 MG: 100 INJECTION, SOLUTION INTRAVENOUS at 08:07

## 2025-07-22 RX ADMIN — MIDAZOLAM HYDROCHLORIDE 2 MG: 5 INJECTION, SOLUTION INTRAMUSCULAR; INTRAVENOUS at 07:07

## 2025-07-22 RX ADMIN — SODIUM CHLORIDE: 9 INJECTION, SOLUTION INTRAVENOUS at 06:07

## 2025-07-22 RX ADMIN — FENTANYL CITRATE 50 MCG: 50 INJECTION, SOLUTION INTRAMUSCULAR; INTRAVENOUS at 08:07

## 2025-07-22 RX ADMIN — LORAZEPAM 1 MG: 2 INJECTION INTRAMUSCULAR; INTRAVENOUS at 06:07

## 2025-07-22 RX ADMIN — ATORVASTATIN CALCIUM 10 MG: 10 TABLET, FILM COATED ORAL at 02:07

## 2025-07-22 RX ADMIN — CLINDAMYCIN IN 5 PERCENT DEXTROSE 900 MG: 18 INJECTION, SOLUTION INTRAVENOUS at 08:07

## 2025-07-22 RX ADMIN — PHENYLEPHRINE HYDROCHLORIDE 100 MCG: 10 INJECTION INTRAVENOUS at 09:07

## 2025-07-22 RX ADMIN — DIAZEPAM 10 MG: 5 TABLET ORAL at 06:07

## 2025-07-22 RX ADMIN — CLOPIDOGREL 75 MG: 75 TABLET ORAL at 02:07

## 2025-07-22 RX ADMIN — MORPHINE SULFATE 4 MG: 4 INJECTION INTRAVENOUS at 07:07

## 2025-07-22 RX ADMIN — EPHEDRINE SULFATE 25 MG: 50 INJECTION INTRAVENOUS at 08:07

## 2025-07-22 RX ADMIN — ASPIRIN 81 MG: 81 TABLET, COATED ORAL at 02:07

## 2025-07-22 RX ADMIN — LIDOCAINE HYDROCHLORIDE 100 MG: 20 INJECTION, SOLUTION INTRAVENOUS at 07:07

## 2025-07-22 RX ADMIN — TRANEXAMIC ACID 1000 MG: 100 INJECTION, SOLUTION INTRAVENOUS at 09:07

## 2025-07-22 RX ADMIN — GLYCOPYRROLATE 0.2 MG: 0.2 INJECTION INTRAMUSCULAR; INTRAVENOUS at 08:07

## 2025-07-22 RX ADMIN — SODIUM CHLORIDE 1000 MG: 9 INJECTION, SOLUTION INTRAVENOUS at 08:07

## 2025-07-22 RX ADMIN — ROCURONIUM BROMIDE 50 MG: 10 INJECTION, SOLUTION INTRAVENOUS at 07:07

## 2025-07-22 RX ADMIN — FENTANYL CITRATE 100 MCG: 50 INJECTION INTRAMUSCULAR; INTRAVENOUS at 10:07

## 2025-07-22 RX ADMIN — PROPOFOL 50 MG: 10 INJECTION, EMULSION INTRAVENOUS at 07:07

## 2025-07-22 RX ADMIN — IPRATROPIUM BROMIDE AND ALBUTEROL SULFATE 3 ML: .5; 3 SOLUTION RESPIRATORY (INHALATION) at 06:07

## 2025-07-22 RX ADMIN — LORAZEPAM 1 MG: 2 INJECTION INTRAMUSCULAR; INTRAVENOUS at 09:07

## 2025-07-22 RX ADMIN — DOCUSATE SODIUM 100 MG: 100 CAPSULE, LIQUID FILLED ORAL at 09:07

## 2025-07-22 RX ADMIN — Medication 9 MG: at 07:07

## 2025-07-22 RX ADMIN — PHENYLEPHRINE HYDROCHLORIDE 200 MCG: 10 INJECTION INTRAVENOUS at 08:07

## 2025-07-22 RX ADMIN — ONDANSETRON 4 MG: 2 INJECTION INTRAMUSCULAR; INTRAVENOUS at 07:07

## 2025-07-22 NOTE — ANESTHESIA PROCEDURE NOTES
Intubation    Date/Time: 7/22/2025 7:59 AM    Performed by: Neelima Freeman CRNA  Authorized by: Richar Black MD    Intubation:     Induction:  Intravenous    Intubated:  Postinduction    Mask Ventilation:  Moderately difficult with oral airway    Attempts:  1    Attempted By:  CRNA    Method of Intubation:  Direct    Blade:  Sharlene 4    Laryngeal View Grade: Grade I - full view of cords      Difficult Airway Encountered?: No      Complications:  None    Airway Device:  Oral endotracheal tube    Airway Device Size:  7.5    Style/Cuff Inflation:  Cuffed (inflated to minimal occlusive pressure)    Tube secured:  22    Secured at:  The lips    Placement Verified By:  Capnometry    Complicating Factors:  None    Findings Post-Intubation:  BS equal bilateral       No

## 2025-07-22 NOTE — NURSING
1330 Patient arrives to floor via stretcher @ this time. Patient alert and orient X 4, dressing clean, dry and intact to Right shoulder, ice pack applied. Family member remains @ the bedside , patient voiced no c/o pain or discomfort noted.

## 2025-07-22 NOTE — OP NOTE
[facility] - Orthopedic Periop Services  General Surgery  Operative Note    SUMMARY     Date of Procedure: 7/22/2025     Procedure: Procedure(s) (LRB):  ARTHROPLASTY, SHOULDER, TOTAL, REVERSE (Right)       Surgeons and Role:     * Giancarlo Coelho MD - Primary    Assisting Surgeon: None    Pre-Operative Diagnosis: Chronic right shoulder pain [M25.511, G89.29]  Rotator cuff tear arthropathy, right [M75.101, M12.811]    Post-Operative Diagnosis: Post-Op Diagnosis Codes:     * Chronic right shoulder pain [M25.511, G89.29]     * Rotator cuff tear arthropathy, right [M75.101, M12.811]    Anesthesia: General    Operative Findings (including complications, if any):  After having risks and benefits of procedure explained at length the patient stating that he understands risks and benefits written informed consent was obtained patient was taken operating room placed in supine position on operative table anesthesia induced per Anesthesia.  He was next placed in the beach chair position in his right upper extremity prepped draped sterile fashion.  10 cm incision was made over the deltopectoral interval on anterior aspect of the shoulder with a 15. Blade.  Careful dissection down to the deltopectoral interval was performed using pickups and scissors.  Hemostasis maintained Bovie electrocautery.  This time using finger dissection the deltopectoral interval was  and the clavipectoral fascia identified on anterior aspect of the shoulder.  At this time clavipectoral fascia was opened with scissors and pickups.  Once again hemostasis maintained Bovie electrocautery.  This time in his noted to be a large rotator cuff tear.  Biceps tendon was identified and tenodesed to the pectoralis major tendon with a 2. FiberWire.  Once this was done the capsule and subscapularis tendon were released from the lesser tuberosity using Bovie electrocautery protecting the neurovascular structures.  Humeral head was then exposed in the  cutting guide for the Salvador reunion TSA reverse shoulder was then placed in 30° retroversion and pinned into position.  The humeral head was then resected.  Cutting guide removed and using a  the canal was reamed to a 13 mm.  A 12 mm trial was then placed and a cover plate placed after with a calcar planed.  At this time humeral head was retracted posteriorly retractors placed in in his remnant of the biceps tendon in his well as the glenoid labrum were removed using Bovie electrocautery.  At this time using the guide placed at the inferior portion of the glenoid the guide for the base plate for the Salvador reunion TSA was placed in a pin placed in his central position anterior to posterior at the inferior aspect of the glenoid.  It was then measured to be a 28 mm in the Reamer then used to prepare the glenoid surface.  Once this was performed the guide pinned in his removed the glenoid was tapped and a central 28 mm with a 28 mm base plate was placed onto the glenoid and secured with 4 peripheral screws in the central screw.  Once this was performed the 32 mm +4 glenosphere was impacted into position.  Trial reduction was performed and a +8 humeral component was chosen.  At this time trial components were removed the 13 mm stem was impacted into position and correct retroversion the +8 humeral polyethylene component was impacted into position onto the stem shoulder reduced noted to be stable with a good motion.  Wound irrigated out copious amounts normal saline with a pulsatile lavage the capsule and subscapularis were repaired back to the lesser tuberosity using 2. FiberWire.  Deltopectoral interval loosely reapproximated with Vicryl subcuticular Vicryl followed by skin staples on skin sterile occlusive dressing placed patient was awakened taken recovery room in good condition all counts were correct no complications    Description of Technical Procedures:     Significant Surgical Tasks Conducted by  the Assistant(s), if Applicable:     Estimated Blood Loss (EBL): 75 mL           Implants:   Implant Name Type Inv. Item Serial No.  Lot No. LRB No. Used Action   PIN 3.2MM 4 [employer] - [id number]  PIN 3.2MM 4 [facility]  Right 1 Implanted and Explanted   WIRE FIXATION REUN NIT PILT - [id number]  WIRE FIXATION REUN NIT PILT  JANAE [employer] HUBERT. [id number] Right 1 Implanted and Explanted   SCREW BONE TSA 6.5X32MM - [id number]  SCREW BONE TSA 6.5X32MM  JANAE [employer] ([employer] HUBERT. [id number] Right 1 Wasted   BASEPLATE REUNION MILLA [id number] - [id number]  BASEPLATE REUNION MILLA [id number]  JANAE "[employer] ([employer] HUBERT. [id number] Right 1 Implanted   SCREW BONE CENTER 28X6.5MM - [id number]  SCREW BONE CENTER 28X6.5MM  JAANE "[employer] ([employer] HUBERT. [id number] Right 1 Implanted   SCREW BONE TSA 4.5X32MM - [id number]  SCREW BONE TSA 4.5X32MM  JANAE "[employer] ([employer] HUBERT. [id number] Right 1 Implanted   SCREW BONE TSA 4.5X28MM - [id number]  SCREW BONE TSA 4.5X28MM  JANAE "[employer] ([employer] HUBERT. [id number] Right 1 Implanted   SCREW PERIPH REUNION 4.5X16MM - [id number]  SCREW PERIPH REUNION 4.5X16MM  JANAE [employer] ([employer] HUBERT. [id number] Right 1 Implanted   SCREW BONE TSA 4.5X28MM - [id number]  SCREW BONE TSA 4.5X28MM  JANAE "[employer] ([employer] HUBERT. [id number] Right 1 Implanted   COMPONENT GLENOSPHERE 32X2MM - [id number]  COMPONENT GLENOSPHERE 32X2MM  JANAE [employer] ([employer] HUBERT. [id number] Right 1 Implanted   INSERT REUNION X3 HUM 8X32MM - [id number]  INSERT REUNION X3 HUM 8X32MM  JANAE [employer] ([employer]IS[employer] HUBERT. [id number] Right 1 Implanted   CUP HUMERAL REUN RSA 32X4MM - [id number]  CUP HUMERAL REUN RSA 32X4MM  JANAE "[employer] (RIS)" HUBERT. [id number] Right 1 Implanted   STEM REUNION PRESSFIT [id number] - [id number]  STEM REUNION PRESSFIT [id number]  [employer] HUBERT. [id number] Right 1 Implanted       Specimens:   Specimen (24h ago, onward)      None                    Condition: Good    Disposition: PACU - hemodynamically stable.    Attestation: I was present and scrubbed for the entire procedure.

## 2025-07-22 NOTE — SUBJECTIVE & OBJECTIVE
Past Medical History:   Diagnosis Date    Asthma     CAD (coronary artery disease)     Compressed cervical disc 07/2023    COPD (chronic obstructive pulmonary disease)     Hyperlipidemia     Hypertension     Low back pain     Pneumothorax after biopsy 10/12/2023    Tobacco dependence due to cigarettes        Past Surgical History:   Procedure Laterality Date    APPENDECTOMY      BACK SURGERY      x3    CARDIAC SURGERY      HAND SURGERY      LEFT HEART CATHETERIZATION      ROTATOR CUFF REPAIR Right     SINUS SURGERY         Review of patient's allergies indicates:   Allergen Reactions    Penicillins Anaphylaxis and Rash     Other reaction(s): Hives, Unknown       No current facility-administered medications on file prior to encounter.     Current Outpatient Medications on File Prior to Encounter   Medication Sig    carvediloL (COREG) 6.25 MG tablet Take 1 tablet (6.25 mg total) by mouth once daily.    losartan (COZAAR) 50 MG tablet Take 1 tablet (50 mg total) by mouth once daily.    albuterol (PROVENTIL HFA) 90 mcg/actuation inhaler Inhale 2 puffs into the lungs every 6 (six) hours as needed for Wheezing. Rescue    aspirin (ECOTRIN) 81 MG EC tablet Take 81 mg by mouth once daily.    budesonide (PULMICORT FLEXHALER) 90 mcg/actuation AePB Inhale 2 puffs (180 mcg total) into the lungs 2 (two) times a day. Controller    budesonide-formoterol 160-4.5 mcg (BREYNA) 160-4.5 mcg/actuation HFAA INHALE 2 PUFFS INTO THE LUNGS EVERY 12 HOURS.    clopidogreL (PLAVIX) 75 mg tablet Take 1 tablet (75 mg total) by mouth once daily.    naloxone (NARCAN) 4 mg/actuation Spry 1 spray by Nasal route.    oxyCODONE-acetaminophen (PERCOCET)  mg per tablet Take 1 tablet by mouth every 6 (six) hours as needed for Pain.    rosuvastatin (CRESTOR) 20 MG tablet Take 1 tablet (20 mg total) by mouth once daily.    [DISCONTINUED] amLODIPine (NORVASC) 5 MG tablet  (Patient not taking: Reported on 3/4/2025)    [DISCONTINUED] benzonatate (TESSALON)  100 MG capsule Take 1 capsule (100 mg total) by mouth 3 (three) times daily as needed for Cough. (Patient not taking: Reported on 3/4/2025)    [DISCONTINUED] fluticasone-umeclidin-vilanter (TRELEGY ELLIPTA) 100-62.5-25 mcg DsDv Inhale 1 puff into the lungs once daily. (Patient not taking: Reported on 3/4/2025)    [DISCONTINUED] irbesartan (AVAPRO) 150 MG tablet  (Patient not taking: Reported on 3/4/2025)    [DISCONTINUED] morphine (MS CONTIN) 30 MG 12 hr tablet Take 30 mg by mouth every 12 (twelve) hours. (Patient not taking: Reported on 3/4/2025)    [DISCONTINUED] nicotine (NICODERM CQ) 21 mg/24 hr Place 1 patch onto the skin once daily. (Patient not taking: Reported on 3/4/2025)    [DISCONTINUED] SPIRIVA RESPIMAT 2.5 mcg/actuation inhaler Inhale 2 puffs into the lungs. (Patient not taking: Reported on 3/4/2025)     Family History       Problem Relation (Age of Onset)    Cancer Other    Diabetes Other    Epilepsy Other    Heart disease Other    Hypertension Other    Stroke Other          Tobacco Use    Smoking status: Every Day     Passive exposure: Past    Smokeless tobacco: Never    Tobacco comments:     1/2-1ppd   Substance and Sexual Activity    Alcohol use: Yes    Drug use: Never    Sexual activity: Yes     Review of Systems   Constitutional:  Negative for chills and fever.   HENT:  Negative for trouble swallowing.    Eyes:  Negative for visual disturbance.   Respiratory:  Negative for cough and shortness of breath.    Cardiovascular:  Negative for chest pain.   Gastrointestinal:  Negative for abdominal pain, nausea and vomiting.   Musculoskeletal:  Positive for arthralgias.   Neurological:  Negative for syncope and weakness.   Psychiatric/Behavioral:  Negative for confusion.      Objective:     Vital Signs (Most Recent):  Temp: 97.7 °F (36.5 °C) (07/22/25 1054)  Pulse: 64 (07/22/25 1315)  Resp: 16 (07/22/25 1102)  BP: (!) 112/49 (07/22/25 1315)  SpO2: 100 % (07/22/25 1315) Vital Signs (24h Range):  Temp:   [97.7 °F (36.5 °C)-98.9 °F (37.2 °C)] 97.7 °F (36.5 °C)  Pulse:  [59-82] 64  Resp:  [13-20] 16  SpO2:  [97 %-100 %] 100 %  BP: ()/(49-85) 112/49     Weight: 70.9 kg (156 lb 3.2 oz)  Body mass index is 22.41 kg/m².     Physical Exam  Constitutional:       General: He is not in acute distress.     Appearance: Normal appearance. He is not ill-appearing.   HENT:      Head: Normocephalic and atraumatic.      Nose: Nose normal.   Eyes:      Conjunctiva/sclera: Conjunctivae normal.      Pupils: Pupils are equal, round, and reactive to light.   Pulmonary:      Effort: Pulmonary effort is normal. No respiratory distress.   Musculoskeletal:      Cervical back: No rigidity.   Skin:     Coloration: Skin is not jaundiced or pale.      Findings: No rash.   Neurological:      Mental Status: He is alert.   Psychiatric:         Behavior: Behavior normal.         Thought Content: Thought content normal.          Significant Labs: All pertinent labs within the past 24 hours have been reviewed.    Significant Imaging: I have reviewed all pertinent imaging results/findings within the past 24 hours.

## 2025-07-22 NOTE — OR NURSING
7/22/2025 - 10:24  Admit to PACU.  Lines and monitors connected.  VS stable.  Afebrile.  No S/S pain, SOB, or N/V.  Right shoulder dressing C/D/I with sling in place.  Report from BHARTI Freeman CRNA.    7/22/2025 - 10:35  Notified family via text message.  Phlebotomist at bedside drawing labs.    7/22/2025 - 10:45  Radiologist at bedside capturing R. Shoulder X-Ray.    7/22/2025 - 11:00 Transferred to Pre/Post Op 19. VS stable.  Afebrile.  No S/S pain, SOB, or N/V.  Right shoulder dressing C/D/I with sling in place.  Report given to Clary RN & Juan RN.  Family member at bedside.  67  14  100%  136/64

## 2025-07-22 NOTE — ASSESSMENT & PLAN NOTE
Continue daily statin therapy    
Patient with known CAD which is controlled Will continue ASA, Plavix, and Statin and monitor for S/Sx of angina/ACS. Continue to monitor on telemetry.   
Patient's COPD is controlled currently.  Patient is currently off COPD Pathway. Continue scheduled inhalers , PRN albuterol, and monitor respiratory status closely.       
Patient's blood pressure range in the last 24 hours was:    Vitals:    07/22/25 0703 07/22/25 1024 07/22/25 1025 07/22/25 1030   BP: 123/85 (!) 97/56 (!) 97/56 (!) 104/53    07/22/25 1035 07/22/25 1039 07/22/25 1040 07/22/25 1045   BP: (!) 102/56 107/62 107/62 (!) 103/58    07/22/25 1050 07/22/25 1054 07/22/25 1055 07/22/25 1102   BP: 108/62 108/62 108/62 136/64    07/22/25 1115 07/22/25 1130 07/22/25 1145 07/22/25 1200   BP: 117/69 115/71 118/73 125/73    07/22/25 1215 07/22/25 1230 07/22/25 1245 07/22/25 1300   BP: 121/73 117/75 117/72 112/68    07/22/25 1315   BP: (!) 112/49       The patient's inpatient anti-hypertensive regimen is listed below:  Current Antihypertensives  losartan tablet 50 mg, Daily, Oral  carvediloL tablet 6.25 mg, 2 times daily, Oral    Plan  - BP is controlled, no changes needed to their regimen  - NOTE: carvedilol recorded as daily in EPIC, per review has been get Qty 180 per 90 days    
Status-post right shoulder arthroplasty, tolerated well. Management and disposition per primary.     - pain control  - PT/OT  - case management for discharge planning, DME      
blood type/rubella/VDRL/RPR/HIV/group B strep/HBsAG

## 2025-07-22 NOTE — NURSING
----- Message from [place], [place] sent at 7/21/2021 11:12 AM EDT -----  Subject: Appointment Request    Reason for Call: Routine (Patient Request) No Script    QUESTIONS  Type of Appointment? Established Patient  Reason for appointment request? Available appointments did not meet   patient need  Additional Information for Provider? Daughter called to schedule an   appointment for pt who fell yesterday and has some L wrist pain and mild   swelling. Next available was 8/3. requesting sooner appt  ---------------------------------------------------------------------------  --------------  CALL BACK INFO  What is the best way for the office to contact you? OK to leave message on   voicemail  Preferred Call Back Phone Number? [phone]  ---------------------------------------------------------------------------  --------------  SCRIPT ANSWERS  Relationship to Patient? Other  Representative Name? daughter  Additional information verified (besides Name and Date of Birth)? Address  Appointment reason? Symptomatic  Select script based on patient symptoms? Adult No Script  (Is the patient requesting to see the provider for a procedure?)? No  (Is the patient requesting to see the provider urgently  today or   tomorrow. )? No  Have you been diagnosed with, awaiting test results for, or told that you   are suspected of having COVID-19 (Coronavirus)? (If patient has tested   negative or was tested as a requirement for work, school, or travel and   not based on symptoms, answer no)? No  Do you currently have flu-like symptoms including fever or chills, cough,   shortness of breath, difficulty breathing, or new loss of taste or smell? No  Have you had close contact with someone with COVID-19 in the last 14 days? No  (Service Expert  click yes below to proceed with [employer] As Usual   Scheduling)?  Yes Patient is jerking, twitching and can't be still,call Dr Driscoll ordered given for [id number] mg  @ 1619.Patient continues to pant back and  forth @ this time, unable to sit still. Patient taken off all his clothes and explaining that he is burning up from the inside. Dr Driscoll ordered 1mg Ativan stat @ 6991 Patient still walking around room unable to be still, will continue to monitor patient.

## 2025-07-22 NOTE — PHARMACY MED REC
"    .Admission Medication History     The home medication history was taken by Mohini Swartz.    You may go to "Admission" then "Reconcile Home Medications" tabs to review and/or act upon these items.     The home medication list has been updated by the Pharmacy department.   Please read ALL comments highlighted in yellow.   Please address this information as you see fit.    Feel free to contact us if you have any questions or require assistance.    Medications added:  Aspirin 325 mg  Oxycodone/acetaminophen  mg      Patient reports no longer taking the following medication(s). The medication(s) listed below were removed from the home medication list. Please reorder if appropriate:  Aspirin 81 mg  Budesonide-formoterol 160-4.5 mcg  Naloxone 4 mg spray    Medications listed below were obtained from: Patient/family and Analytic software- [employer]        Current Outpatient Medications on File Prior to Encounter   Medication Sig Dispense Refill Last Dose/Taking    albuterol (PROVENTIL HFA) 90 mcg/actuation inhaler Inhale 2 puffs into the lungs every 6 (six) hours as needed for Wheezing. Rescue 18 g 11 Taking As Needed    aspirin (ECOTRIN) 325 MG EC tablet Take 325 mg by mouth once daily.   Taking    budesonide (PULMICORT FLEXHALER) 90 mcg/actuation AePB Inhale 2 puffs (180 mcg total) into the lungs 2 (two) times a day. Controller 1 each 2 Taking    carvediloL (COREG) 6.25 MG tablet Take 1 tablet (6.25 mg total) by mouth once daily. 90 tablet 3 7/22/2025 at  4:30 AM    clopidogreL (PLAVIX) 75 mg tablet Take 1 tablet (75 mg total) by mouth once daily. 90 tablet 3 Taking    losartan (COZAAR) 50 MG tablet Take 1 tablet (50 mg total) by mouth once daily. 90 tablet 3 7/22/2025 at  4:30 AM    oxyCODONE-acetaminophen (PERCOCET)  mg per tablet Take 1 tablet by mouth every 6 (six) hours as needed for Pain.   Taking As Needed    rosuvastatin (CRESTOR) 20 MG tablet Take 1 tablet (20 mg total) by mouth once daily. 90 " tablet 3 Taking    [DISCONTINUED] aspirin (ECOTRIN) 81 MG EC tablet Take 81 mg by mouth once daily.   Taking    [DISCONTINUED] amLODIPine (NORVASC) 5 MG tablet  (Patient not taking: Reported on 3/4/2025)       [DISCONTINUED] benzonatate (TESSALON) 100 MG capsule Take 1 capsule (100 mg total) by mouth 3 (three) times daily as needed for Cough. (Patient not taking: Reported on 3/4/2025) 20 capsule 0     [DISCONTINUED] budesonide-formoterol 160-4.5 mcg (BREYNA) 160-4.5 mcg/actuation HFAA INHALE 2 PUFFS INTO THE LUNGS EVERY 12 HOURS. 10.2 g 11     [DISCONTINUED] fluticasone-umeclidin-vilanter (TRELEGY ELLIPTA) 100-62.5-25 mcg DsDv Inhale 1 puff into the lungs once daily. (Patient not taking: Reported on 3/4/2025) 60 each 11     [DISCONTINUED] irbesartan (AVAPRO) 150 MG tablet  (Patient not taking: Reported on 3/4/2025)       [DISCONTINUED] morphine (MS CONTIN) 30 MG 12 hr tablet Take 30 mg by mouth every 12 (twelve) hours. (Patient not taking: Reported on 3/4/2025)       [DISCONTINUED] naloxone (NARCAN) 4 mg/actuation Spry 1 spray by Nasal route.       [DISCONTINUED] nicotine (NICODERM CQ) 21 mg/24 hr Place 1 patch onto the skin once daily. (Patient not taking: Reported on 3/4/2025) 30 patch 1     [DISCONTINUED] SPIRIVA RESPIMAT 2.5 mcg/actuation inhaler Inhale 2 puffs into the lungs. (Patient not taking: Reported on 3/4/2025)            Potential issues to be addressed PRIOR TO DISCHARGE  No issues identified    Mohini Swartz  Medication Access Specialist  EXT. [id number]               .

## 2025-07-22 NOTE — CONSULTS
[facility] - [facility] Tele[facility] Hospital Medicine  Consult Note    Patient Name: Garett Banegas  MRN: [id number]  Admission Date: 7/22/2025  Hospital Length of Stay: 0 days  Attending Physician: Giancarlo Coelho MD   Primary Care Provider: Giancarlo Azar MD           Patient information was obtained from patient and past medical records.     Consults  Subjective:     Principal Problem: Rotator cuff tear arthropathy, right    Chief Complaint: No chief complaint on file.       HPI:   Garett Banegas is a 65 y.o. male with history of COPD, asthma, CAD, and HTN. He is status-post right shoulder arthoplasty by Dr. Coelho earlier today (07/22/2025). Patient tolerated procedure well with no immediate post-operative complications. Medicine consulted for management of chronic conditions.     Patient was seen and examined after surgery. Chart reviewed. Pre-operative labs unremarkable. Patient is afebrile with vital signs grossly within normal limits, and no complaints. Pain is controlled and is being managed by primary team. PT/OT evaluation pending, discharge plan to be determined.  following for DME and other discharge needs.       Past Medical History:   Diagnosis Date    Asthma     CAD (coronary artery disease)     Compressed cervical disc 07/2023    COPD (chronic obstructive pulmonary disease)     Hyperlipidemia     Hypertension     Low back pain     Pneumothorax after biopsy 10/12/2023    Tobacco dependence due to cigarettes        Past Surgical History:   Procedure Laterality Date    APPENDECTOMY      BACK SURGERY      x3    CARDIAC SURGERY      HAND SURGERY      LEFT HEART CATHETERIZATION      ROTATOR CUFF REPAIR Right     SINUS SURGERY         Review of patient's allergies indicates:   Allergen Reactions    Penicillins Anaphylaxis and Rash     Other reaction(s): Hives, Unknown       No current facility-administered medications on file prior to encounter.     Current Outpatient  Medications on File Prior to Encounter   Medication Sig    carvediloL (COREG) 6.25 MG tablet Take 1 tablet (6.25 mg total) by mouth once daily.    losartan (COZAAR) 50 MG tablet Take 1 tablet (50 mg total) by mouth once daily.    albuterol (PROVENTIL HFA) 90 mcg/actuation inhaler Inhale 2 puffs into the lungs every 6 (six) hours as needed for Wheezing. Rescue    aspirin (ECOTRIN) 81 MG EC tablet Take 81 mg by mouth once daily.    budesonide (PULMICORT FLEXHALER) 90 mcg/actuation AePB Inhale 2 puffs (180 mcg total) into the lungs 2 (two) times a day. Controller    budesonide-formoterol 160-4.5 mcg (BREYNA) 160-4.5 mcg/actuation HFAA INHALE 2 PUFFS INTO THE LUNGS EVERY 12 HOURS.    clopidogreL (PLAVIX) 75 mg tablet Take 1 tablet (75 mg total) by mouth once daily.    naloxone (NARCAN) 4 mg/actuation Spry 1 spray by Nasal route.    oxyCODONE-acetaminophen (PERCOCET)  mg per tablet Take 1 tablet by mouth every 6 (six) hours as needed for Pain.    rosuvastatin (CRESTOR) 20 MG tablet Take 1 tablet (20 mg total) by mouth once daily.    [DISCONTINUED] amLODIPine (NORVASC) 5 MG tablet  (Patient not taking: Reported on 3/4/2025)    [DISCONTINUED] benzonatate (TESSALON) 100 MG capsule Take 1 capsule (100 mg total) by mouth 3 (three) times daily as needed for Cough. (Patient not taking: Reported on 3/4/2025)    [DISCONTINUED] fluticasone-umeclidin-vilanter (TRELEGY ELLIPTA) 100-62.5-25 mcg DsDv Inhale 1 puff into the lungs once daily. (Patient not taking: Reported on 3/4/2025)    [DISCONTINUED] irbesartan (AVAPRO) 150 MG tablet  (Patient not taking: Reported on 3/4/2025)    [DISCONTINUED] morphine (MS CONTIN) 30 MG 12 hr tablet Take 30 mg by mouth every 12 (twelve) hours. (Patient not taking: Reported on 3/4/2025)    [DISCONTINUED] nicotine (NICODERM CQ) 21 mg/24 hr Place 1 patch onto the skin once daily. (Patient not taking: Reported on 3/4/2025)    [DISCONTINUED] SPIRIVA RESPIMAT 2.5 mcg/actuation inhaler Inhale 2 puffs  into the lungs. (Patient not taking: Reported on 3/4/2025)     Family History       Problem Relation (Age of Onset)    Cancer Other    Diabetes Other    Epilepsy Other    Heart disease Other    Hypertension Other    Stroke Other          Tobacco Use    Smoking status: Every Day     Passive exposure: Past    Smokeless tobacco: Never    Tobacco comments:     1/2-1ppd   Substance and Sexual Activity    Alcohol use: Yes    Drug use: Never    Sexual activity: Yes     Review of Systems   Constitutional:  Negative for chills and fever.   HENT:  Negative for trouble swallowing.    Eyes:  Negative for visual disturbance.   Respiratory:  Negative for cough and shortness of breath.    Cardiovascular:  Negative for chest pain.   Gastrointestinal:  Negative for abdominal pain, nausea and vomiting.   Musculoskeletal:  Positive for arthralgias.   Neurological:  Negative for syncope and weakness.   Psychiatric/Behavioral:  Negative for confusion.      Objective:     Vital Signs (Most Recent):  Temp: 97.7 °F (36.5 °C) (07/22/25 1054)  Pulse: 64 (07/22/25 1315)  Resp: 16 (07/22/25 1102)  BP: (!) 112/49 (07/22/25 1315)  SpO2: 100 % (07/22/25 1315) Vital Signs (24h Range):  Temp:  [97.7 °F (36.5 °C)-98.9 °F (37.2 °C)] 97.7 °F (36.5 °C)  Pulse:  [59-82] 64  Resp:  [13-20] 16  SpO2:  [97 %-100 %] 100 %  BP: ()/(49-85) 112/49     Weight: 70.9 kg (156 lb 3.2 oz)  Body mass index is 22.41 kg/m².     Physical Exam  Constitutional:       General: He is not in acute distress.     Appearance: Normal appearance. He is not ill-appearing.   HENT:      Head: Normocephalic and atraumatic.      Nose: Nose normal.   Eyes:      Conjunctiva/sclera: Conjunctivae normal.      Pupils: Pupils are equal, round, and reactive to light.   Pulmonary:      Effort: Pulmonary effort is normal. No respiratory distress.   Musculoskeletal:      Cervical back: No rigidity.   Skin:     Coloration: Skin is not jaundiced or pale.      Findings: No rash.    Neurological:      Mental Status: He is alert.   Psychiatric:         Behavior: Behavior normal.         Thought Content: Thought content normal.          Significant Labs: All pertinent labs within the past 24 hours have been reviewed.    Significant Imaging: I have reviewed all pertinent imaging results/findings within the past 24 hours.  Assessment/Plan:     * Rotator cuff tear arthropathy, right  Status-post right shoulder arthroplasty, tolerated well. Management and disposition per primary.     - pain control  - PT/OT  - case management for discharge planning, DME        Mixed hyperlipidemia  Continue daily statin therapy      Coronary artery disease involving native coronary artery of native heart without angina pectoris  Patient with known CAD which is controlled Will continue ASA, Plavix, and Statin and monitor for S/Sx of angina/ACS. Continue to monitor on telemetry.     Asthma-COPD overlap syndrome  Patient's COPD is controlled currently.  Patient is currently off COPD Pathway. Continue scheduled inhalers , PRN albuterol, and monitor respiratory status closely.         Essential hypertension  Patient's blood pressure range in the last 24 hours was:    Vitals:    07/22/25 0703 07/22/25 1024 07/22/25 1025 07/22/25 1030   BP: 123/85 (!) 97/56 (!) 97/56 (!) 104/53    07/22/25 1035 07/22/25 1039 07/22/25 1040 07/22/25 1045   BP: (!) 102/56 107/62 107/62 (!) 103/58    07/22/25 1050 07/22/25 1054 07/22/25 1055 07/22/25 1102   BP: 108/62 108/62 108/62 136/64    07/22/25 1115 07/22/25 1130 07/22/25 1145 07/22/25 1200   BP: 117/69 115/71 118/73 125/73    07/22/25 1215 07/22/25 1230 07/22/25 1245 07/22/25 1300   BP: 121/73 117/75 117/72 112/68    07/22/25 1315   BP: (!) 112/49       The patient's inpatient anti-hypertensive regimen is listed below:  Current Antihypertensives  losartan tablet 50 mg, Daily, Oral  carvediloL tablet 6.25 mg, 2 times daily, Oral    Plan  - BP is controlled, no changes needed to their  regimen  - NOTE: carvedilol recorded as daily in EPIC, per review has been get Qty 180 per 90 days        VTE Risk Mitigation (From admission, onward)           Ordered     apixaban tablet 2.5 mg  2 times daily         07/22/25 1345     Place JULIANNE hose  Until discontinued        Comments: JULIANNE to non-operative leg.    07/22/25 1346     IP VTE HIGH RISK PATIENT  Once         07/22/25 1345                        Thank you for your consult. I will follow-up with patient. Please contact us if you have any additional questions.    Yen Driscoll, DO  Department of Hospital Medicine   [facility] - [address] [place] [address]

## 2025-07-22 NOTE — ANESTHESIA PREPROCEDURE EVALUATION
07/22/2025  Garett Banegas is a 65 y.o., male.      Pre-op Assessment    I have reviewed the Patient Summary Reports.     I have reviewed the Nursing Notes. I have reviewed the NPO Status.   I have reviewed the Medications.     Review of Systems  Anesthesia Hx:             Denies Family Hx of Anesthesia complications.    Denies Personal Hx of Anesthesia complications.                    Social:  Smoker       Cardiovascular:     Hypertension   CAD  asymptomatic CABG/stent        hyperlipidemia   ECG has been reviewed.                            Pulmonary:   COPD, mild Asthma  Shortness of breath                      Physical Exam  General: Well nourished, Cooperative, Alert and Oriented    Airway:  Mallampati: II / II  Mouth Opening: Normal  TM Distance: Normal  Neck ROM: Normal ROM    Dental:  Intact    Chest/Lungs:  Clear to auscultation    Heart:  Rate: Normal  Rhythm: Regular Rhythm  Sounds: Normal        Chemistry        Component Value Date/Time     07/18/2025 0908    K 4.4 07/18/2025 0908     (H) 07/18/2025 0908    CO2 26 07/18/2025 0908    BUN 18 07/18/2025 0908    CREATININE 0.94 07/18/2025 0908     07/18/2025 0908        Component Value Date/Time    CALCIUM 9.1 07/18/2025 0908    ALKPHOS 90 07/18/2025 0908    AST 31 07/18/2025 0908    ALT 20 07/18/2025 0908    BILITOT 0.3 07/18/2025 0908    EGFRNONAA 79 07/14/2021 1417        Lab Results   Component Value Date    WBC 7.01 07/18/2025    HGB 14.1 07/18/2025    HCT 42.1 07/18/2025     07/18/2025     No results found for this or any previous visit.  No results found for this or any previous visit.        Anesthesia Plan  Type of Anesthesia, risks & benefits discussed:    Anesthesia Type: Gen ETT, Regional  Intra-op Monitoring Plan: Standard ASA Monitors  Post Op Pain Control Plan: multimodal analgesia and peripheral nerve  block  Induction:  IV  Airway Plan: Direct  Informed Consent: Informed consent signed with the Patient and all parties understand the risks and agree with anesthesia plan.  All questions answered.   ASA Score: 3  Day of Surgery Review of History & Physical: H&P Update referred to the surgeon/provider.I have interviewed and examined the patient. I have reviewed the patient's H&P dated: There are no significant changes.     Ready For Surgery From Anesthesia Perspective.     .

## 2025-07-22 NOTE — HPI
Garett Banegas is a 65 y.o. male with history of COPD, asthma, CAD, and HTN. He is status-post right shoulder arthoplasty by Dr. Coelho earlier today (07/22/2025). Patient tolerated procedure well with no immediate post-operative complications. Medicine consulted for management of chronic conditions.     Patient was seen and examined after surgery. Chart reviewed. Pre-operative labs unremarkable. Patient is afebrile with vital signs grossly within normal limits, and no complaints. Pain is controlled and is being managed by primary team. PT/OT evaluation pending, discharge plan to be determined.  following for DME and other discharge needs.

## 2025-07-23 VITALS
DIASTOLIC BLOOD PRESSURE: 73 MMHG | HEART RATE: 78 BPM | HEIGHT: 70 IN | SYSTOLIC BLOOD PRESSURE: 119 MMHG | RESPIRATION RATE: 17 BRPM | TEMPERATURE: 99 F | OXYGEN SATURATION: 98 % | WEIGHT: 156.19 LBS | BODY MASS INDEX: 22.36 KG/M2

## 2025-07-23 LAB
ALBUMIN SERPL BCP-MCNC: 3.6 G/DL (ref 3.4–4.8)
ALBUMIN/GLOB SERPL: 1.2 {RATIO}
ALP SERPL-CCNC: 85 U/L (ref 40–150)
ALT SERPL W P-5'-P-CCNC: 30 U/L
ANION GAP SERPL CALCULATED.3IONS-SCNC: 11 MMOL/L (ref 7–16)
AST SERPL W P-5'-P-CCNC: 75 U/L (ref 11–45)
BASOPHILS # BLD AUTO: 0.02 K/UL (ref 0–0.2)
BASOPHILS NFR BLD AUTO: 0.2 % (ref 0–1)
BILIRUB SERPL-MCNC: 0.9 MG/DL
BUN SERPL-MCNC: 22 MG/DL (ref 8–26)
BUN/CREAT SERPL: 27 (ref 6–20)
CALCIUM SERPL-MCNC: 8.5 MG/DL (ref 8.8–10)
CHLORIDE SERPL-SCNC: 112 MMOL/L (ref 98–107)
CO2 SERPL-SCNC: 21 MMOL/L (ref 23–31)
CREAT SERPL-MCNC: 0.82 MG/DL (ref 0.72–1.25)
DIFFERENTIAL METHOD BLD: ABNORMAL
EGFR (NO RACE VARIABLE) (RUSH/TITUS): 98 ML/MIN/1.73M2
EOSINOPHIL # BLD AUTO: 0 K/UL (ref 0–0.5)
EOSINOPHIL NFR BLD AUTO: 0 % (ref 1–4)
ERYTHROCYTE [DISTWIDTH] IN BLOOD BY AUTOMATED COUNT: 13.1 % (ref 11.5–14.5)
GLOBULIN SER-MCNC: 2.9 G/DL (ref 2–4)
GLUCOSE SERPL-MCNC: 136 MG/DL (ref 82–115)
HCT VFR BLD AUTO: 37.8 % (ref 40–54)
HGB BLD-MCNC: 12.5 G/DL (ref 13.5–18)
IMM GRANULOCYTES # BLD AUTO: 0.03 K/UL (ref 0–0.04)
IMM GRANULOCYTES NFR BLD: 0.3 % (ref 0–0.4)
LYMPHOCYTES # BLD AUTO: 1.23 K/UL (ref 1–4.8)
LYMPHOCYTES NFR BLD AUTO: 11.3 % (ref 27–41)
MCH RBC QN AUTO: 31.7 PG (ref 27–31)
MCHC RBC AUTO-ENTMCNC: 33.1 G/DL (ref 32–36)
MCV RBC AUTO: 95.9 FL (ref 80–96)
MONOCYTES # BLD AUTO: 1.25 K/UL (ref 0–0.8)
MONOCYTES NFR BLD AUTO: 11.5 % (ref 2–6)
MPC BLD CALC-MCNC: 10.7 FL (ref 9.4–12.4)
NEUTROPHILS # BLD AUTO: 8.37 K/UL (ref 1.8–7.7)
NEUTROPHILS NFR BLD AUTO: 76.7 % (ref 53–65)
NRBC # BLD AUTO: 0 X10E3/UL
NRBC, AUTO (.00): 0 %
PLATELET # BLD AUTO: 212 K/UL (ref 150–400)
POTASSIUM SERPL-SCNC: 3.5 MMOL/L (ref 3.5–5.1)
PROT SERPL-MCNC: 6.5 G/DL (ref 5.8–7.6)
RBC # BLD AUTO: 3.94 M/UL (ref 4.6–6.2)
SODIUM SERPL-SCNC: 140 MMOL/L (ref 136–145)
WBC # BLD AUTO: 10.9 K/UL (ref 4.5–11)

## 2025-07-23 PROCEDURE — 85025 COMPLETE CBC W/AUTO DIFF WBC: CPT | Performed by: ORTHOPAEDIC SURGERY

## 2025-07-23 PROCEDURE — 36415 COLL VENOUS BLD VENIPUNCTURE: CPT | Performed by: ORTHOPAEDIC SURGERY

## 2025-07-23 PROCEDURE — 25000242 PHARM REV CODE 250 ALT 637 W/ HCPCS: Performed by: ORTHOPAEDIC SURGERY

## 2025-07-23 PROCEDURE — 63600175 PHARM REV CODE 636 W HCPCS: Performed by: ORTHOPAEDIC SURGERY

## 2025-07-23 PROCEDURE — 97161 PT EVAL LOW COMPLEX 20 MIN: CPT

## 2025-07-23 PROCEDURE — 94761 N-INVAS EAR/PLS OXIMETRY MLT: CPT

## 2025-07-23 PROCEDURE — 25000003 PHARM REV CODE 250: Performed by: FAMILY MEDICINE

## 2025-07-23 PROCEDURE — 94640 AIRWAY INHALATION TREATMENT: CPT | Mod: XB

## 2025-07-23 PROCEDURE — 25000003 PHARM REV CODE 250: Performed by: ORTHOPAEDIC SURGERY

## 2025-07-23 PROCEDURE — 80053 COMPREHEN METABOLIC PANEL: CPT | Performed by: ORTHOPAEDIC SURGERY

## 2025-07-23 RX ORDER — OXYCODONE AND ACETAMINOPHEN 10; 325 MG/1; MG/1
1 TABLET ORAL EVERY 6 HOURS PRN
Qty: 28 TABLET | Refills: 0 | Status: SHIPPED | OUTPATIENT
Start: 2025-07-23 | End: 2025-07-30

## 2025-07-23 RX ADMIN — APIXABAN 2.5 MG: 2.5 TABLET, FILM COATED ORAL at 08:07

## 2025-07-23 RX ADMIN — LOSARTAN POTASSIUM 50 MG: 50 TABLET, FILM COATED ORAL at 09:07

## 2025-07-23 RX ADMIN — HYDROCODONE BITARTRATE AND ACETAMINOPHEN 1 TABLET: 5; 325 TABLET ORAL at 05:07

## 2025-07-23 RX ADMIN — BUDESONIDE INHALATION 0.5 MG: 0.5 SUSPENSION RESPIRATORY (INHALATION) at 07:07

## 2025-07-23 RX ADMIN — DOCUSATE SODIUM 100 MG: 100 CAPSULE, LIQUID FILLED ORAL at 08:07

## 2025-07-23 RX ADMIN — ATORVASTATIN CALCIUM 10 MG: 10 TABLET, FILM COATED ORAL at 08:07

## 2025-07-23 RX ADMIN — CARVEDILOL 6.25 MG: 6.25 TABLET, FILM COATED ORAL at 08:07

## 2025-07-23 RX ADMIN — ASPIRIN 81 MG: 81 TABLET, COATED ORAL at 08:07

## 2025-07-23 RX ADMIN — ALBUTEROL SULFATE 2.5 MG: 2.5 SOLUTION RESPIRATORY (INHALATION) at 07:07

## 2025-07-23 RX ADMIN — CLINDAMYCIN PHOSPHATE 600 MG: 600 INJECTION, SOLUTION INTRAVENOUS at 09:07

## 2025-07-23 RX ADMIN — MORPHINE SULFATE 4 MG: 4 INJECTION INTRAVENOUS at 07:07

## 2025-07-23 RX ADMIN — HYDROCODONE BITARTRATE AND ACETAMINOPHEN 1 TABLET: 5; 325 TABLET ORAL at 10:07

## 2025-07-23 RX ADMIN — CLOPIDOGREL 75 MG: 75 TABLET ORAL at 08:07

## 2025-07-23 RX ADMIN — MORPHINE SULFATE 4 MG: 4 INJECTION INTRAVENOUS at 02:07

## 2025-07-23 NOTE — PT/OT/SLP EVAL
Physical Therapy Evaluation    Patient Name:  Garett Banegas   MRN:  [id number]    Recommendations:     Discharge Recommendations: Low Intensity Therapy   Discharge Equipment Recommendations: none   Barriers to discharge: None    Assessment:     Garett Banegas is a 65 y.o. male admitted with a medical diagnosis of Rotator cuff tear arthropathy, right.  He presents with the following impairments/functional limitations:   Patient with good understanding of post op education.    Rehab Prognosis: Good; patient would benefit from acute skilled PT services to address these deficits and reach maximum level of function.    Recent Surgery: Procedure(s) (LRB):  ARTHROPLASTY, SHOULDER, TOTAL, REVERSE (Right) 1 Day Post-Op    Plan:     During this hospitalization, patient to be seen 1 x/week to address the identified rehab impairments via therapeutic activities and progress toward the following goals:    Plan of Care Expires:  07/23/25    Subjective     Chief Complaint: post op pain  Patient/Family Comments/goals: Patient plans on dc home today  Pain/Comfort:  Pain Rating 1: 5/10  Location - Side 1: Right  Pain Rating Post-Intervention 2: 0/10    Patients cultural, spiritual, Muslim conflicts given the current situation:      Living Environment:  Lives with family  Prior to admission, patients level of function was independent.  Equipment used at home: none.  DME owned (not currently used): none.  Upon discharge, patient will have assistance from family.    Objective:     Communicated with nurse prior to session.  Patient found supine with    upon PT entry to room.    General Precautions: Standard, fall  Orthopedic Precautions:    Braces: UE Sling  Respiratory Status: Room air    Exams:  na    Functional Mobility:  NT      AM-PAC 6 CLICK MOBILITY  Total Score:24       Treatment & Education:  Don/doff sling  Don/doff shirt  Hep: hand and elbow rom    Patient left supine with call button in reach.    GOALS:    Multidisciplinary Problems       Physical Therapy Goals       Not on file                    DME Justifications:  No DME recommended requiring DME justifications    History:     Past Medical History:   Diagnosis Date    Asthma     CAD (coronary artery disease)     Compressed cervical disc 07/2023    COPD (chronic obstructive pulmonary disease)     Hyperlipidemia     Hypertension     Low back pain     Pneumothorax after biopsy 10/12/2023    Tobacco dependence due to cigarettes        Past Surgical History:   Procedure Laterality Date    APPENDECTOMY      BACK SURGERY      x3    CARDIAC SURGERY      HAND SURGERY      LEFT HEART CATHETERIZATION      REVERSE TOTAL SHOULDER ARTHROPLASTY Right 7/22/2025    Procedure: ARTHROPLASTY, SHOULDER, TOTAL, REVERSE;  Surgeon: Giancarlo Coelho MD;  Location: [facility];  Service: Orthopedics;  Laterality: Right;    ROTATOR CUFF REPAIR Right     SINUS SURGERY         Time Tracking:     PT Received On: 07/23/25  PT Start Time: 1100     PT Stop Time: 1125  PT Total Time (min): 25 min     Billable Minutes: Evaluation 20      07/23/2025

## 2025-07-23 NOTE — CARE UPDATE
Patient s/p reverse total shoulder today. Notified by the nurse has been restless and unable to sit still since coming to the floor. Has removed surgical dressing and IV. Given ativan prn with little change. At the bedside patient found to restless, sweaty. Endorses feeling restless and can not stop moving . Medications reviewed and symptoms are thought to be secondary to anesthesia. Patient given benadryl 50 IV. If responses, recommend continue po benadryl for 2-3 days to prevent recurrence.

## 2025-07-23 NOTE — DISCHARGE INSTRUCTIONS
Noted co-morbidities/PMH of HTN, HLD, CAD, Hx MI, and Asthma-COPD overlap syndrome; care management, treatment compliance, medication compliance, and diet reviewed.    Diet: Heart healthy  Keep surgical extremity elevated.   Ice to affected area.  Lifting restrictions: OK for range of motion of hand wrist and elbow keeping shoulder immobilized.  Keep dressing dry and intact. Remove dressing in 72 hours. Shower on day dressing is removed. (No bath)  Continue to wear JULIANNE hose for the next 4 weeks post-op. May remove 2 times a day, for 1 hour, and replace.  Wear abduction pillow or sling to arm as ordered.  Change dressing in 3 days; apply 4x4 guaze and apply tegaderm over it. If dressing become saturated you may change dressing again, If no drainage on dressing leave dressing on until follow up appointment.  Notify physicians office if any problems or concerns.  Notify your healthcare provider if you experience any of the following: temperature >100.4  Notify your healthcare provider if you experience any of the following: redness, tenderness, or any signs or symptoms of infection (pain, swelling, redness, odor or green/yellow drainage around incision site).  Notify your healthcare provider if you experience any of the following: difficulty breathing or increased cough.  Notify your physician if you experience any persistent nausea, vomiting, diarrhea or headache.  Notify your physician if you experience any of the following: severe uncontrolled pain, worsening rash, increased confusion, weakness, dizziness, lightheadedness, or visual disturbances.

## 2025-07-23 NOTE — DISCHARGE SUMMARY
Department of Orthopedic Surgery  Discharge Note    Admit Date: 7/22/2025  Discharge Date and Time: 7/23/2025   Attending Physician: Giancarlo Coelho MD   Discharge Provider: Poonam Byrd    Pre-op Diagnosis: Chronic right shoulder pain [M25.511, G89.29]  Rotator cuff tear arthropathy, right [M75.101, M12.811]  Procedure:  Reverse Total shoulder arthroplasty  Final Diagnosis:     Disposition: Home or Self Care  Discharged Condition: good    Hospital Course:   Patient came in on 7/22/2025 and underwent reverse right total shoulder arthroplasty without complications. POD1 patient resting comfortably in bed without complaint this morning.  Reportedly moved his shoulder in the middle of the night last night.  Two-view x-rays of right shoulder show total arthroplasty in place.  Okay to DC home today.  Patient will go home with Percocet 10 as needed for pain.  Keep shoulder immobilized.  Follow up with the orthopedic clinic 2 weeks.    Physical Exam:  Right shoulder surgical dressing clean dry and intact, deltoid firing appropriately, good range of motion of the hand wrist and elbow, neurovascularly intact  Surgical incision is clean,dry,intact with minimal/expected erythema and swelling    Discharge Instructions:   Discharge Procedure Orders (must include Diet, Follow-up, Activity)   Diet Adult Regular     Keep surgical extremity elevated     Ice to affected area     Lifting restrictions   Order Comments: OK for range of motion of hand wrist and elbow keeping shoulder immobilized     Notify your health care provider if you experience any of the following:  redness, tenderness, or signs of infection (pain, swelling, redness, odor or green/yellow discharge around incision site)     Notify your health care provider if you experience any of the following:  persistent nausea and vomiting or diarrhea     Notify your health care provider if you experience any of the following:  temperature >100.4     Notify your health  care provider if you experience any of the following:  severe uncontrolled pain      Remove dressing in 72 hours     Shower on day dressing removed (No bath)        Medications:     Medication List        PAUSE taking these medications      * oxyCODONE-acetaminophen  mg per tablet  Wait to take this until your doctor or other care provider tells you to start again.  Commonly known as: PERCOCET  You also have another medication with the same name that you may need to continue taking.           * This list has 1 medication(s) that are the same as other medications prescribed for you. Read the directions carefully, and ask your doctor or other care provider to review them with you.                CHANGE how you take these medications      * oxyCODONE-acetaminophen  mg per tablet  Commonly known as: PERCOCET  Take 1 tablet by mouth every 6 (six) hours as needed for pain... May cause drowisness  What changed: Another medication with the same name was paused. Ask your nurse or doctor if you should take this medication.           * This list has 1 medication(s) that are the same as other medications prescribed for you. Read the directions carefully, and ask your doctor or other care provider to review them with you.                CONTINUE taking these medications      albuterol 90 mcg/actuation inhaler  Commonly known as: PROVENTIL HFA  Inhale 2 puffs into the lungs every 6 (six) hours as needed for Wheezing. Rescue     aspirin 325 MG EC tablet  Commonly known as: ECOTRIN     budesonide 90 mcg/actuation Aepb  Commonly known as: PULMICORT FLEXHALER  Inhale 2 puffs (180 mcg total) into the lungs 2 (two) times a day. Controller     carvediloL 6.25 MG tablet  Commonly known as: COREG  Take 1 tablet (6.25 mg total) by mouth once daily.     clopidogreL 75 mg tablet  Commonly known as: PLAVIX  Take 1 tablet (75 mg total) by mouth once daily.     losartan 50 MG tablet  Commonly known as: COZAAR  Take 1 tablet (50 mg  total) by mouth once daily.     rosuvastatin 20 MG tablet  Commonly known as: CRESTOR  Take 1 tablet (20 mg total) by mouth once daily.               Where to Get Your Medications        These medications were sent to [facility]  [address] [place] [address] [place] [id number]      Hours: Mon-Fri, 8:30a-5:00p Phone: [phone]   oxyCODONE-acetaminophen  mg per tablet           Follow up/Patient Instructions:     Follow-up Information       Giancarlo Coelho MD. Go on 8/4/2025.    Specialty: Orthopedic Surgery  Why: Post-op follow up appointment with Dr. Coelho, on 8/4/25, at 08:40am.  Contact information:  [address] [place] [address]  Giancarlo Coelho Md, Orthopedic & Sports Medicine, [facility] [address]  [phone]

## 2025-07-23 NOTE — PLAN OF CARE
Problem: Adult Inpatient Plan of Care  Goal: Plan of Care Review  Outcome: Progressing  Goal: Patient-Specific Goal (Individualized)  Outcome: Progressing  Goal: Absence of Hospital-Acquired Illness or Injury  Outcome: Progressing  Goal: Optimal Comfort and Wellbeing  Outcome: Progressing  Goal: Readiness for Transition of Care  Outcome: Progressing     Problem: Wound  Goal: Optimal Coping  Outcome: Progressing  Goal: Optimal Functional Ability  Outcome: Progressing  Goal: Absence of Infection Signs and Symptoms  Outcome: Progressing  Goal: Improved Oral Intake  Outcome: Progressing  Goal: Optimal Pain Control and Function  Outcome: Progressing  Goal: Skin Health and Integrity  Outcome: Progressing  Goal: Optimal Wound Healing  Outcome: Progressing     Problem: Fall Injury Risk  Goal: Absence of Fall and Fall-Related Injury  Outcome: Progressing     Problem: Restraint, Nonviolent  Goal: Absence of Harm or Injury  Outcome: Progressing

## 2025-07-23 NOTE — PROGRESS NOTES
Pharmacist Discharge Note     Patient is a 65 y.o. male who presented to hospital with Rotator cuff tear arthropathy, right.     Admission medication history done on 7/22/25 by Mohini Swartz    Discharge Medication List:   Reconciled Home Medications:      Medication List        PAUSE taking these medications      * oxyCODONE-acetaminophen  mg per tablet  Wait to take this until your doctor or other care provider tells you to start again.  Commonly known as: PERCOCET  Take 1 tablet by mouth every 6 (six) hours as needed for Pain.  You also have another medication with the same name that you may need to continue taking.           * This list has 1 medication(s) that are the same as other medications prescribed for you. Read the directions carefully, and ask your doctor or other care provider to review them with you.                CHANGE how you take these medications      * oxyCODONE-acetaminophen  mg per tablet  Commonly known as: PERCOCET  Take 1 tablet by mouth every 6 (six) hours as needed for pain... May cause drowisness  What changed: Another medication with the same name was paused. Ask your nurse or doctor if you should take this medication.           * This list has 1 medication(s) that are the same as other medications prescribed for you. Read the directions carefully, and ask your doctor or other care provider to review them with you.                CONTINUE taking these medications      albuterol 90 mcg/actuation inhaler  Commonly known as: PROVENTIL HFA  Inhale 2 puffs into the lungs every 6 (six) hours as needed for Wheezing. Rescue     aspirin 325 MG EC tablet  Commonly known as: ECOTRIN  Take 325 mg by mouth once daily.     budesonide 90 mcg/actuation Aepb  Commonly known as: PULMICORT FLEXHALER  Inhale 2 puffs (180 mcg total) into the lungs 2 (two) times a day. Controller     carvediloL 6.25 MG tablet  Commonly known as: COREG  Take 1 tablet (6.25 mg total) by mouth once daily.      clopidogreL 75 mg tablet  Commonly known as: PLAVIX  Take 1 tablet (75 mg total) by mouth once daily.     losartan 50 MG tablet  Commonly known as: COZAAR  Take 1 tablet (50 mg total) by mouth once daily.     rosuvastatin 20 MG tablet  Commonly known as: CRESTOR  Take 1 tablet (20 mg total) by mouth once daily.              Medication Changes Made: See above    Follow-up Recommendations:     The discharge counseling and medication review were performed by Katiana Enrique on 7/23/25    The following prescriptions were sent to [facility]: Percocet    Thank you for allowing me to participate in this patient's care,  Katiana Enrique  Pharmacist  EXT. [phone]

## 2025-07-23 NOTE — NURSING
Pt agitated and restless since beginning of shift. PRN meds given with no relief to pt (See MAR).    1944-Dr. Roy notified via secure chat of pt continuously getting up out of the bed. Pt removed sling to right arm several times and removed 2 IV'S. Family at bedside unable to keep pt calm. Resident Jonel up to see pt. New orders for IV benadryl per dr roy (see MAR).    2151- Pt still agitated. Family left for the night. I came to bedside to sit with pt to try and redirect pt and to try and prevent pt from falling. Even with being in the room, pt continuously getting up out of the bed and is very unsteady on his feet.     2201-Orders for 4pt restraints received. Pt placed in restraints with assistance from other staff members.     2228-Pt in restraints and still agitated. One time dose of fentanyl ordered. (See MAR)    2245- notified pt wife via phone that pt was placed in 4pt restraints to try and keep him safe and that the meds he received have not yet helped him to calm down. Pt wife stated she would come back to stay with the patient for the night.     Will continue to monitor pt. Vs stable.

## 2025-07-24 LAB
OHS QRS DURATION: 100 MS
OHS QTC CALCULATION: 419 MS

## 2025-07-24 NOTE — PROGRESS NOTES
[facility] - [address] [place], [place] [address]etry  Wound Care    Patient Name:  Garett Banegas   MRN:  [id number]  Date: 7/24/2025  Diagnosis: Rotator cuff tear arthropathy, right    History:     Past Medical History:   Diagnosis Date    Asthma     CAD (coronary artery disease)     Compressed cervical disc 07/2023    COPD (chronic obstructive pulmonary disease)     Hyperlipidemia     Hypertension     Low back pain     Pneumothorax after biopsy 10/12/2023    Tobacco dependence due to cigarettes        Social History[1]    Precautions:     Allergies as of 05/23/2025 - Reviewed 05/12/2025   Allergen Reaction Noted    Penicillins Rash 07/06/2021       WO Assessment Details/Treatment     Narrative: Seen patient for initiation of preventative skin care measures    Patient up on side of bed, Alert. Family present. Right shoulder dressing with transparent dressing.  States skin is okay.   Elvin score 23    Consult wound care for any skin issues          07/24/2025         [1]   Social History  Socioeconomic History    Marital status:    Tobacco Use    Smoking status: Every Day     Passive exposure: Past    Smokeless tobacco: Never    Tobacco comments:     1/2-1ppd   Substance and Sexual Activity    Alcohol use: Yes    Drug use: Never    Sexual activity: Yes     Social Drivers of Health     Financial Resource Strain: Low Risk  (7/22/2025)    Overall Financial Resource Strain (CARDIA)     Difficulty of Paying Living Expenses: Not hard at all   Food Insecurity: No Food Insecurity (7/22/2025)    Hunger Vital Sign     Worried About Running Out of Food in the Last Year: Never true     Ran Out of Food in the Last Year: Never true   Transportation Needs: No Transportation Needs (7/22/2025)    PRAPARE - Transportation     Lack of Transportation (Medical): No     Lack of Transportation (Non-Medical): No   Physical Activity: Sufficiently Active (10/13/2023)    Exercise Vital Sign     Days of Exercise per Week: 6 days      Minutes of Exercise per Session: 60 min   Stress: No Stress Concern Present (7/22/2025)    [employer] [place] [employer] - Occupational Stress Questionnaire     Feeling of Stress : Not at all   Housing Stability: Low Risk  (7/22/2025)    Housing Stability Vital Sign     Unable to Pay for Housing in the Last Year: No     Homeless in the Last Year: No

## 2025-07-28 RX ORDER — ROPIVACAINE HYDROCHLORIDE 5 MG/ML
INJECTION, SOLUTION EPIDURAL; INFILTRATION; PERINEURAL
Status: DISCONTINUED | OUTPATIENT
Start: 2025-07-22 | End: 2025-07-28

## 2025-07-28 NOTE — ANESTHESIA POSTPROCEDURE EVALUATION
Anesthesia Post Evaluation    Patient: Garett Banegas    Procedure(s) Performed: Procedure(s) (LRB):  ARTHROPLASTY, SHOULDER, TOTAL, REVERSE (Right)    Final Anesthesia Type: general      Patient location during evaluation: PACU  Patient participation: Yes- Able to Participate  Level of consciousness: awake and alert  Post-procedure vital signs: reviewed and stable  Pain management: adequate  Airway patency: patent  RACHEL mitigation strategies: Multimodal analgesia and Use of major conduction anesthesia (spinal/epidural) or peripheral nerve block  PONV status at discharge: No PONV  Anesthetic complications: no      Cardiovascular status: blood pressure returned to baseline  Respiratory status: unassisted  Hydration status: euvolemic  Follow-up not needed.              Vitals Value Taken Time   /49 07/22/25 13:16   Temp 36.5 °C (97.7 °F) 07/22/25 10:54   Pulse 68 07/22/25 13:16   Resp 16 07/22/25 11:02   SpO2 100 % 07/22/25 13:15   Vitals shown include unfiled device data.      Event Time   Out of Recovery 10:54:00         Pain/Herberth Score: No data recorded

## 2025-07-28 NOTE — ANESTHESIA PROCEDURE NOTES
Peripheral Block    Patient location during procedure: OR   Block not for primary anesthetic.  Reason for block: at surgeon's request and post-op pain management   Post-op Pain Location: right shoulder   Start time: 7/22/2025 8:03 AM  Timeout: 7/22/2025 8:02 AM   End time: 7/22/2025 8:03 AM    Staffing  Authorizing Provider: Richar Black MD  Performing Provider: Richar Black MD    Staffing  Performed by: Richar Black MD  Authorized by: Richar Black MD    Preanesthetic Checklist  Completed: patient identified, IV checked, site marked, risks and benefits discussed, surgical consent, monitors and equipment checked, pre-op evaluation and timeout performed  Peripheral Block  Patient position: sitting  Prep: ChloraPrep  Patient monitoring: heart rate, continuous pulse ox, continuous capnometry, frequent blood pressure checks and cardiac monitor  Block type: interscalene  Laterality: right  Injection technique: single shot  Needle  Needle type: Stimuplex   Needle gauge: 20 G  Needle length: 2 in  Needle localization: nerve stimulator and ultrasound guidance   -ultrasound image captured on disc.  Assessment  Injection assessment: negative aspiration, negative parasthesia and local visualized surrounding nerve  Paresthesia pain: none  Heart rate change: no  Slow fractionated injection: yes  Pain Tolerance: comfortable throughout block  Medications:    Medications: ROPIvacaine (NAROPIN) injection 0.5% - Perineural   30 mL - 7/22/2025 8:02:00 AM             Procedure:  COLONOSCOPY    Relevant Problems   CARDIO   (+) Benign essential hypertension      ENDO   (+) Controlled type 2 diabetes mellitus with microalbuminuria, without long-term current use of insulin  (HCC)      /RENAL   (+) Prostate cancer (HCC)      Other   (+) Class 2 severe obesity due to excess calories with serious comorbidity and body mass index (BMI) of 38.0 to 38.9 in adult (HCC)        Physical Exam    Airway     Mallampati score: III  TM Distance: <3 FB        Cardiovascular  Rhythm: regular, Rate: normal    Dental        Pulmonary   Breath sounds clear to auscultation    Neurological      Other Findings        Anesthesia Plan  ASA Score- 2     Anesthesia Type- IV sedation with anesthesia with ASA Monitors.         Additional Monitors:     Airway Plan:     Comment: GA PRN, , 1 L IVF ordered.       Plan Factors-Exercise tolerance (METS): >4 METS.    Chart reviewed.   Existing labs reviewed.     Patient is not a current smoker.      Obstructive sleep apnea risk education given perioperatively.        Induction- intravenous.    Postoperative Plan-         Informed Consent- Anesthetic plan and risks discussed with patient.  I personally reviewed this patient with the CRNA. Discussed and agreed on the Anesthesia Plan with the CRNA..      NPO Status:  Vitals Value Taken Time   Date of last liquid 06/10/25 06/10/25 11:56   Time of last liquid 0800 06/10/25 11:56   Date of last solid 06/08/25 06/10/25 11:56   Time of last solid 2100 06/10/25 11:56

## 2025-08-04 ENCOUNTER — OFFICE VISIT (OUTPATIENT)
Dept: ORTHOPEDICS | Facility: CLINIC | Age: 65
End: 2025-08-04
Payer: OTHER MISCELLANEOUS

## 2025-08-04 ENCOUNTER — HOSPITAL ENCOUNTER (OUTPATIENT)
Dept: RADIOLOGY | Facility: HOSPITAL | Age: 65
Discharge: HOME | End: 2025-08-04
Attending: ORTHOPAEDIC SURGERY
Payer: COMMERCIAL

## 2025-08-04 VITALS
SYSTOLIC BLOOD PRESSURE: 140 MMHG | DIASTOLIC BLOOD PRESSURE: 80 MMHG | HEIGHT: 70 IN | BODY MASS INDEX: 23.62 KG/M2 | HEART RATE: 81 BPM | OXYGEN SATURATION: 96 % | WEIGHT: 165 LBS

## 2025-08-04 DIAGNOSIS — Z96.619 HISTORY OF TOTAL SHOULDER REPLACEMENT, UNSPECIFIED LATERALITY: Primary | ICD-10-CM

## 2025-08-04 DIAGNOSIS — Z96.619 HISTORY OF TOTAL SHOULDER REPLACEMENT, UNSPECIFIED LATERALITY: ICD-10-CM

## 2025-08-04 PROCEDURE — 99024 POSTOP FOLLOW-UP VISIT: CPT | Mod: ,,, | Performed by: ORTHOPAEDIC SURGERY

## 2025-08-04 PROCEDURE — 99999 PR PBB SHADOW E&M-EST. PATIENT-LVL IV: CPT | Mod: PBBFAC,,, | Performed by: ORTHOPAEDIC SURGERY

## 2025-08-04 PROCEDURE — 73030 X-RAY EXAM OF SHOULDER: CPT | Mod: 26,RT,, | Performed by: ORTHOPAEDIC SURGERY

## 2025-08-04 PROCEDURE — 99214 OFFICE O/P EST MOD 30 MIN: CPT | Mod: PBBFAC,25 | Performed by: ORTHOPAEDIC SURGERY

## 2025-08-04 PROCEDURE — 73030 X-RAY EXAM OF SHOULDER: CPT | Mod: TC,RT

## 2025-08-04 NOTE — LETTER
August 4, 2025      [facility]  [address] [place] [address]  Phone: [phone]  Fax: [phone]       Patient: Garett Banegas   YOB: 1960  Date of Visit: 08/04/2025    To Whom It May Concern:    Clovis Banegas  was at [facility] on 08/04/2025. The patient will remain off of work until after I see him back in four weeks. If you have any questions or concerns, or if I can be of further assistance, please do not hesitate to contact me.    Sincerely,  MD Yen Jennings MA

## 2025-08-04 NOTE — PROGRESS NOTES
Patient is here for right shoulder reverse total shoulder arthroplasty.  In his x-rays show no loosening of the components.  In his wounds are clean and dry.  Stitches removed.  Neurovascularly intact distally.  We will let him do activities below little over his shoulder.  No external rotation no active range motion.  We will start that in 4 weeks.  Neurovascularly he is intact distally.  No lifting pushing or pulling with the right arm.

## 2025-08-04 NOTE — PROGRESS NOTES
Two views right shoulder skeletally mature individual AP and lateral there is a reverse total shoulder arthroplasty in place no loosening of components no shift alignment impression reverse total shoulder arthroplasty in place no loosening.  Skin staples are in place.

## 2025-08-29 ENCOUNTER — PATIENT MESSAGE (OUTPATIENT)
Facility: HOSPITAL | Age: 65
End: 2025-08-29
Payer: COMMERCIAL

## (undated) DEVICE — BIT DRILL 3.1MM

## (undated) DEVICE — BLADE SAW SGTTL 18.6X.8X73.8MM

## (undated) DEVICE — GLOVE SENSICARE PI GRN 8

## (undated) DEVICE — KIT IRR SUCTION HND PIECE

## (undated) DEVICE — STAPLER SKIN WIDE

## (undated) DEVICE — GLOVE SENSICARE PI SURG 7.5

## (undated) DEVICE — OVERLAY MATTRESS WAFFLE

## (undated) DEVICE — SPONGE COTTON TRAY 4X4IN

## (undated) DEVICE — PACK ECLIPSE BASIC III SURG

## (undated) DEVICE — PENCIL SMK EVAC CONNECTOR 10FT

## (undated) DEVICE — DRESSING MEPILEX HEEL 22X23CM

## (undated) DEVICE — SOL NACL IRR 3000ML

## (undated) DEVICE — SOL NACL IRR 1000ML BTL

## (undated) DEVICE — 6.5MM CENTER SCREW
Type: IMPLANTABLE DEVICE | Site: SHOULDER | Status: NON-FUNCTIONAL
Brand: REUNION

## (undated) DEVICE — Device

## (undated) DEVICE — SUT 2-0 VICRYL / CT-1

## (undated) DEVICE — DRESSING TRANS 8X12 TEGADERM

## (undated) DEVICE — SLING ARM STP PAD BLUE XL 9X22

## (undated) DEVICE — NITINOL PILOT WIRE
Type: IMPLANTABLE DEVICE | Site: SHOULDER | Status: NON-FUNCTIONAL
Brand: REUNION
Removed: 2025-07-22

## (undated) DEVICE — GLOVE SENSICARE PI SURG 8.5

## (undated) DEVICE — SUT BLU BR 2 TAPERD NDL 1/2

## (undated) DEVICE — APPLICATOR CHLORAPREP ORN 26ML

## (undated) DEVICE — FREE NEEDLE

## (undated) DEVICE — PIN 3.2MM 4 KANT SQUARE
Type: IMPLANTABLE DEVICE | Site: SHOULDER | Status: NON-FUNCTIONAL
Removed: 2025-07-22

## (undated) DEVICE — PAD ABDOMINAL 8X7.5 STERILE

## (undated) DEVICE — GLOVE SENSICARE PI SURG 7

## (undated) DEVICE — GOWN TOGA SYS PEELWY ZIP 2 XL

## (undated) DEVICE — GLOVE SENSICARE PI GRN 7

## (undated) DEVICE — SUT VICRYL CTD 1 27IN CP

## (undated) DEVICE — SOL IRRI STRL WATER 1000ML

## (undated) DEVICE — DRESSING MEPILEX SACR 22X25CM

## (undated) DEVICE — GLOVE SENSICARE PI SURG 6.5

## (undated) DEVICE — GLOVE SENSICARE PI GRN 8.5

## (undated) DEVICE — GLOVE SENSICARE PI GRN 6.5